# Patient Record
Sex: FEMALE | Race: WHITE | Employment: OTHER | URBAN - METROPOLITAN AREA
[De-identification: names, ages, dates, MRNs, and addresses within clinical notes are randomized per-mention and may not be internally consistent; named-entity substitution may affect disease eponyms.]

---

## 2018-01-03 PROBLEM — M54.2 NECK PAIN: Status: ACTIVE | Noted: 2018-01-03

## 2018-04-10 ENCOUNTER — HOSPITAL ENCOUNTER (EMERGENCY)
Age: 48
Discharge: HOME | End: 2018-04-10
Attending: EMERGENCY MEDICINE
Payer: OTHER GOVERNMENT

## 2018-04-10 VITALS
WEIGHT: 155 LBS | TEMPERATURE: 98.6 F | RESPIRATION RATE: 16 BRPM | BODY MASS INDEX: 26.46 KG/M2 | HEIGHT: 64 IN | SYSTOLIC BLOOD PRESSURE: 110 MMHG | OXYGEN SATURATION: 96 % | HEART RATE: 58 BPM | DIASTOLIC BLOOD PRESSURE: 60 MMHG

## 2018-04-10 DIAGNOSIS — J01.00 ACUTE NON-RECURRENT MAXILLARY SINUSITIS: Primary | ICD-10-CM

## 2018-04-10 PROCEDURE — 99283 EMERGENCY DEPT VISIT LOW MDM: CPT

## 2018-04-10 RX ORDER — METHYLPREDNISOLONE 4 MG/1
TABLET ORAL
Qty: 1 KIT | Refills: 0 | Status: SHIPPED | OUTPATIENT
Start: 2018-04-10 | End: 2018-04-16

## 2018-04-10 RX ORDER — AZITHROMYCIN 250 MG/1
TABLET, FILM COATED ORAL
Qty: 1 PACKET | Refills: 0 | Status: SHIPPED | OUTPATIENT
Start: 2018-04-10 | End: 2018-04-20

## 2018-04-10 ASSESSMENT — PAIN DESCRIPTION - LOCATION: LOCATION: HEAD

## 2018-04-10 ASSESSMENT — PAIN SCALES - GENERAL: PAINLEVEL_OUTOF10: 10

## 2018-04-10 ASSESSMENT — PAIN DESCRIPTION - PAIN TYPE: TYPE: ACUTE PAIN

## 2018-04-10 ASSESSMENT — PAIN DESCRIPTION - DESCRIPTORS: DESCRIPTORS: ACHING

## 2018-06-19 PROBLEM — M50.30 BULGING OF CERVICAL INTERVERTEBRAL DISC: Status: ACTIVE | Noted: 2018-06-19

## 2018-08-02 ENCOUNTER — HOSPITAL ENCOUNTER (EMERGENCY)
Age: 48
Discharge: HOME | End: 2018-08-02
Attending: FAMILY MEDICINE
Payer: OTHER GOVERNMENT

## 2018-08-02 ENCOUNTER — APPOINTMENT (OUTPATIENT)
Dept: GENERAL RADIOLOGY | Age: 48
End: 2018-08-02
Payer: OTHER GOVERNMENT

## 2018-08-02 VITALS
TEMPERATURE: 98 F | OXYGEN SATURATION: 98 % | HEIGHT: 64 IN | WEIGHT: 153 LBS | RESPIRATION RATE: 16 BRPM | HEART RATE: 80 BPM | BODY MASS INDEX: 26.12 KG/M2 | DIASTOLIC BLOOD PRESSURE: 66 MMHG | SYSTOLIC BLOOD PRESSURE: 126 MMHG

## 2018-08-02 DIAGNOSIS — R07.9 CHEST PAIN, UNSPECIFIED TYPE: Primary | ICD-10-CM

## 2018-08-02 DIAGNOSIS — F41.1 ANXIETY STATE: ICD-10-CM

## 2018-08-02 LAB
ALBUMIN SERPL-MCNC: 4.2 G/DL (ref 3.5–5.2)
ALP BLD-CCNC: 65 U/L (ref 35–104)
ALT SERPL-CCNC: 7 U/L (ref 0–32)
ANION GAP SERPL CALCULATED.3IONS-SCNC: 16 MMOL/L (ref 7–16)
AST SERPL-CCNC: 12 U/L (ref 0–31)
BASOPHILS ABSOLUTE: 0.05 E9/L (ref 0–0.2)
BASOPHILS RELATIVE PERCENT: 0.5 % (ref 0–2)
BILIRUB SERPL-MCNC: 0.2 MG/DL (ref 0–1.2)
BUN BLDV-MCNC: 20 MG/DL (ref 6–20)
CALCIUM SERPL-MCNC: 9.1 MG/DL (ref 8.6–10.2)
CHLORIDE BLD-SCNC: 104 MMOL/L (ref 98–107)
CK MB: 1.8 NG/ML (ref 0–4.3)
CO2: 18 MMOL/L (ref 22–29)
CREAT SERPL-MCNC: 0.8 MG/DL (ref 0.5–1)
EOSINOPHILS ABSOLUTE: 0.14 E9/L (ref 0.05–0.5)
EOSINOPHILS RELATIVE PERCENT: 1.3 % (ref 0–6)
GFR AFRICAN AMERICAN: >60
GFR NON-AFRICAN AMERICAN: >60 ML/MIN/1.73
GLUCOSE BLD-MCNC: 104 MG/DL (ref 74–109)
HCT VFR BLD CALC: 40.2 % (ref 34–48)
HEMOGLOBIN: 14.3 G/DL (ref 11.5–15.5)
IMMATURE GRANULOCYTES #: 0.07 E9/L
IMMATURE GRANULOCYTES %: 0.6 % (ref 0–5)
LYMPHOCYTES ABSOLUTE: 3.85 E9/L (ref 1.5–4)
LYMPHOCYTES RELATIVE PERCENT: 35.3 % (ref 20–42)
MCH RBC QN AUTO: 31.7 PG (ref 26–35)
MCHC RBC AUTO-ENTMCNC: 35.6 % (ref 32–34.5)
MCV RBC AUTO: 89.1 FL (ref 80–99.9)
MONOCYTES ABSOLUTE: 0.9 E9/L (ref 0.1–0.95)
MONOCYTES RELATIVE PERCENT: 8.2 % (ref 2–12)
NEUTROPHILS ABSOLUTE: 5.91 E9/L (ref 1.8–7.3)
NEUTROPHILS RELATIVE PERCENT: 54.1 % (ref 43–80)
PDW BLD-RTO: 13.5 FL (ref 11.5–15)
PLATELET # BLD: 307 E9/L (ref 130–450)
PMV BLD AUTO: 11.4 FL (ref 7–12)
POTASSIUM SERPL-SCNC: 4 MMOL/L (ref 3.5–5)
RBC # BLD: 4.51 E12/L (ref 3.5–5.5)
SODIUM BLD-SCNC: 138 MMOL/L (ref 132–146)
TOTAL PROTEIN: 7.4 G/DL (ref 6.4–8.3)
TROPONIN: <0.01 NG/ML (ref 0–0.03)
WBC # BLD: 10.9 E9/L (ref 4.5–11.5)

## 2018-08-02 PROCEDURE — 84484 ASSAY OF TROPONIN QUANT: CPT

## 2018-08-02 PROCEDURE — 82553 CREATINE MB FRACTION: CPT

## 2018-08-02 PROCEDURE — 80053 COMPREHEN METABOLIC PANEL: CPT

## 2018-08-02 PROCEDURE — 71045 X-RAY EXAM CHEST 1 VIEW: CPT

## 2018-08-02 PROCEDURE — 85025 COMPLETE CBC W/AUTO DIFF WBC: CPT

## 2018-08-02 PROCEDURE — 36415 COLL VENOUS BLD VENIPUNCTURE: CPT

## 2018-08-02 PROCEDURE — 2580000003 HC RX 258: Performed by: FAMILY MEDICINE

## 2018-08-02 PROCEDURE — 6370000000 HC RX 637 (ALT 250 FOR IP): Performed by: FAMILY MEDICINE

## 2018-08-02 PROCEDURE — 99285 EMERGENCY DEPT VISIT HI MDM: CPT

## 2018-08-02 RX ORDER — SODIUM CHLORIDE 9 MG/ML
INJECTION, SOLUTION INTRAVENOUS CONTINUOUS
Status: DISCONTINUED | OUTPATIENT
Start: 2018-08-02 | End: 2018-08-02 | Stop reason: HOSPADM

## 2018-08-02 RX ORDER — ALPRAZOLAM 0.25 MG/1
0.25 TABLET ORAL NIGHTLY PRN
COMMUNITY
End: 2018-11-30

## 2018-08-02 RX ORDER — NITROGLYCERIN 0.4 MG/1
0.4 TABLET SUBLINGUAL EVERY 5 MIN PRN
Status: DISCONTINUED | OUTPATIENT
Start: 2018-08-02 | End: 2018-08-02 | Stop reason: HOSPADM

## 2018-08-02 RX ORDER — ASPIRIN 81 MG/1
324 TABLET, CHEWABLE ORAL ONCE
Status: COMPLETED | OUTPATIENT
Start: 2018-08-02 | End: 2018-08-02

## 2018-08-02 RX ADMIN — SODIUM CHLORIDE: 9 INJECTION, SOLUTION INTRAVENOUS at 18:31

## 2018-08-02 RX ADMIN — ASPIRIN 81 MG CHEWABLE TABLET 324 MG: 81 TABLET CHEWABLE at 18:31

## 2018-08-02 ASSESSMENT — PAIN DESCRIPTION - PAIN TYPE: TYPE: ACUTE PAIN

## 2018-08-02 ASSESSMENT — PAIN DESCRIPTION - FREQUENCY: FREQUENCY: CONTINUOUS

## 2018-08-02 ASSESSMENT — PAIN SCALES - GENERAL: PAINLEVEL_OUTOF10: 2

## 2018-08-02 ASSESSMENT — PAIN DESCRIPTION - DESCRIPTORS: DESCRIPTORS: ACHING

## 2018-08-02 ASSESSMENT — PAIN DESCRIPTION - LOCATION: LOCATION: CHEST

## 2018-08-02 NOTE — ED PROVIDER NOTES
(degenerative joint disease); Fibromyalgia; GERD (gastroesophageal reflux disease); Hiatal hernia; Osteoarthritis; Osteopenia; and Seizure ([address] Past Surgical History:  has a past surgical history that includes Hysterectomy; Endometrial ablation; Knee cartilage surgery; Knee arthroscopy; fracture surgery; ECHO Compl W Dop Color Flow (3/21/2012); Cholecystectomy; back surgery (x2); and Breast surgery. Social History:  reports that she quit smoking about 5 years ago. She quit after 20.00 years of use. She has never used smokeless tobacco. She reports that she does not drink alcohol or use drugs. Family History: family history is not on file. The patients home medications have been reviewed. Allergies: Patient has no known allergies.     -------------------------------------------------- RESULTS -------------------------------------------------  Results for orders placed or performed during the hospital encounter of 08/02/18   CBC Auto Differential   Result Value Ref Range    WBC 10.9 4.5 - 11.5 E9/L    RBC 4.51 3.50 - 5.50 E12/L    Hemoglobin 14.3 11.5 - 15.5 g/dL    Hematocrit 40.2 34.0 - 48.0 %    MCV 89.1 80.0 - 99.9 fL    MCH 31.7 26.0 - 35.0 pg    MCHC 35.6 (H) 32.0 - 34.5 %    RDW 13.5 11.5 - 15.0 fL    Platelets 148 164 - 815 E9/L    MPV 11.4 7.0 - 12.0 fL    Neutrophils % 54.1 43.0 - 80.0 %    Immature Granulocytes % 0.6 0.0 - 5.0 %    Lymphocytes % 35.3 20.0 - 42.0 %    Monocytes % 8.2 2.0 - 12.0 %    Eosinophils % 1.3 0.0 - 6.0 %    Basophils % 0.5 0.0 - 2.0 %    Neutrophils # 5.91 1.80 - 7.30 E9/L    Immature Granulocytes # 0.07 E9/L    Lymphocytes # 3.85 1.50 - 4.00 E9/L    Monocytes # 0.90 0.10 - 0.95 E9/L    Eosinophils # 0.14 0.05 - 0.50 E9/L    Basophils # 0.05 0.00 - 0.20 E9/L   Comprehensive Metabolic Panel   Result Value Ref Range    Sodium 138 132 - 146 mmol/L    Potassium 4.0 3.5 - 5.0 mmol/L    Chloride 104 98 - 107 mmol/L    CO2 18 (L) 22 - 29 mmol/L    Anion Gap 16 7 - 16

## 2018-08-04 ENCOUNTER — HOSPITAL ENCOUNTER (EMERGENCY)
Age: 48
Discharge: HOME | End: 2018-08-04
Attending: EMERGENCY MEDICINE
Payer: OTHER GOVERNMENT

## 2018-08-04 VITALS
OXYGEN SATURATION: 98 % | TEMPERATURE: 98.2 F | SYSTOLIC BLOOD PRESSURE: 113 MMHG | HEIGHT: 64 IN | HEART RATE: 97 BPM | BODY MASS INDEX: 26.12 KG/M2 | DIASTOLIC BLOOD PRESSURE: 84 MMHG | WEIGHT: 153 LBS | RESPIRATION RATE: 18 BRPM

## 2018-08-04 DIAGNOSIS — F41.1 ANXIETY STATE: Primary | ICD-10-CM

## 2018-08-04 PROCEDURE — 99285 EMERGENCY DEPT VISIT HI MDM: CPT

## 2018-08-04 PROCEDURE — 6370000000 HC RX 637 (ALT 250 FOR IP): Performed by: EMERGENCY MEDICINE

## 2018-08-04 PROCEDURE — 96372 THER/PROPH/DIAG INJ SC/IM: CPT

## 2018-08-04 PROCEDURE — 6360000002 HC RX W HCPCS: Performed by: EMERGENCY MEDICINE

## 2018-08-04 RX ORDER — LORAZEPAM 1 MG/1
1 TABLET ORAL ONCE
Status: COMPLETED | OUTPATIENT
Start: 2018-08-04 | End: 2018-08-04

## 2018-08-04 RX ORDER — HYDROXYZINE PAMOATE 25 MG/1
25 CAPSULE ORAL 4 TIMES DAILY PRN
Qty: 30 CAPSULE | Refills: 0 | Status: SHIPPED | OUTPATIENT
Start: 2018-08-04 | End: 2018-08-18

## 2018-08-04 RX ORDER — HYDROXYZINE HYDROCHLORIDE 50 MG/ML
50 INJECTION, SOLUTION INTRAMUSCULAR ONCE
Status: COMPLETED | OUTPATIENT
Start: 2018-08-04 | End: 2018-08-04

## 2018-08-04 RX ORDER — GABAPENTIN 300 MG/1
600 CAPSULE ORAL NIGHTLY
COMMUNITY
End: 2018-11-30

## 2018-08-04 RX ADMIN — HYDROXYZINE HYDROCHLORIDE 50 MG: 50 INJECTION, SOLUTION INTRAMUSCULAR at 19:06

## 2018-08-04 RX ADMIN — LORAZEPAM 1 MG: 1 TABLET ORAL at 17:36

## 2018-08-04 ASSESSMENT — PAIN DESCRIPTION - FREQUENCY: FREQUENCY: CONTINUOUS

## 2018-08-04 ASSESSMENT — PAIN DESCRIPTION - DESCRIPTORS
DESCRIPTORS: CONSTANT
DESCRIPTORS_2: SHARP
DESCRIPTORS: CONSTANT;DISCOMFORT;SHARP

## 2018-08-04 ASSESSMENT — PAIN DESCRIPTION - PAIN TYPE
TYPE: ACUTE PAIN
TYPE: ACUTE PAIN

## 2018-08-04 ASSESSMENT — PAIN DESCRIPTION - LOCATION
LOCATION: JAW
LOCATION: CHEST;JAW
LOCATION_2: CHEST

## 2018-08-04 ASSESSMENT — PAIN SCALES - GENERAL
PAINLEVEL_OUTOF10: 8
PAINLEVEL_OUTOF10: 10

## 2018-08-04 ASSESSMENT — PAIN DESCRIPTION - INTENSITY: RATING_2: 7

## 2018-08-04 ASSESSMENT — PAIN DESCRIPTION - ORIENTATION
ORIENTATION: LEFT
ORIENTATION: LEFT;RIGHT
ORIENTATION_2: MID;ANTERIOR

## 2018-08-04 ASSESSMENT — PAIN DESCRIPTION - ONSET: ONSET: PROGRESSIVE

## 2018-08-04 ASSESSMENT — PAIN DESCRIPTION - PROGRESSION: CLINICAL_PROGRESSION: GRADUALLY WORSENING
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
## 2018-08-04 NOTE — ED NOTES
nonrebreather applied for hyperventilation. SO with pt and call light in reach. Cannot stop shaking and feeling tense. Ativan given.      Kash Mccoy RN  08/04/18 0044
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
## 2018-08-17 LAB
EKG ATRIAL RATE: 72 BPM
EKG ATRIAL RATE: 80 BPM
EKG ATRIAL RATE: 99 BPM
EKG P AXIS: 56 DEGREES
EKG P AXIS: 64 DEGREES
EKG P AXIS: 67 DEGREES
EKG P-R INTERVAL: 124 MS
EKG P-R INTERVAL: 144 MS
EKG P-R INTERVAL: 144 MS
EKG Q-T INTERVAL: 350 MS
EKG Q-T INTERVAL: 402 MS
EKG Q-T INTERVAL: 404 MS
EKG QRS DURATION: 78 MS
EKG QRS DURATION: 86 MS
EKG QRS DURATION: 90 MS
EKG QTC CALCULATION (BAZETT): 440 MS
EKG QTC CALCULATION (BAZETT): 449 MS
EKG QTC CALCULATION (BAZETT): 465 MS
EKG R AXIS: 26 DEGREES
EKG R AXIS: 39 DEGREES
EKG R AXIS: 47 DEGREES
EKG T AXIS: -2 DEGREES
EKG T AXIS: -57 DEGREES
EKG T AXIS: 42 DEGREES
EKG VENTRICULAR RATE: 72 BPM
EKG VENTRICULAR RATE: 80 BPM
EKG VENTRICULAR RATE: 99 BPM

## 2018-09-07 ENCOUNTER — APPOINTMENT (OUTPATIENT)
Dept: GENERAL RADIOLOGY | Age: 48
End: 2018-09-07
Payer: MEDICARE

## 2018-09-07 ENCOUNTER — HOSPITAL ENCOUNTER (EMERGENCY)
Age: 48
Discharge: HOME | End: 2018-09-07
Attending: EMERGENCY MEDICINE
Payer: MEDICARE

## 2018-09-07 VITALS
RESPIRATION RATE: 14 BRPM | OXYGEN SATURATION: 100 % | HEIGHT: 64 IN | TEMPERATURE: 98.1 F | DIASTOLIC BLOOD PRESSURE: 59 MMHG | WEIGHT: 155 LBS | SYSTOLIC BLOOD PRESSURE: 113 MMHG | HEART RATE: 79 BPM | BODY MASS INDEX: 26.46 KG/M2

## 2018-09-07 DIAGNOSIS — F41.1 ANXIETY STATE: ICD-10-CM

## 2018-09-07 DIAGNOSIS — R07.9 CHEST PAIN, UNSPECIFIED TYPE: Primary | ICD-10-CM

## 2018-09-07 LAB
ANION GAP SERPL CALCULATED.3IONS-SCNC: 15 MMOL/L (ref 7–16)
BASOPHILS ABSOLUTE: 0.04 E9/L (ref 0–0.2)
BASOPHILS RELATIVE PERCENT: 0.4 % (ref 0–2)
BUN BLDV-MCNC: 14 MG/DL (ref 6–20)
CALCIUM SERPL-MCNC: 9.6 MG/DL (ref 8.6–10.2)
CHLORIDE BLD-SCNC: 106 MMOL/L (ref 98–107)
CO2: 19 MMOL/L (ref 22–29)
CREAT SERPL-MCNC: 0.7 MG/DL (ref 0.5–1)
EKG ATRIAL RATE: 66 BPM
EKG Q-T INTERVAL: 430 MS
EKG QRS DURATION: 78 MS
EKG QTC CALCULATION (BAZETT): 443 MS
EKG R AXIS: 39 DEGREES
EKG T AXIS: 32 DEGREES
EKG VENTRICULAR RATE: 64 BPM
EOSINOPHILS ABSOLUTE: 0.04 E9/L (ref 0.05–0.5)
EOSINOPHILS RELATIVE PERCENT: 0.4 % (ref 0–6)
GFR AFRICAN AMERICAN: >60
GFR NON-AFRICAN AMERICAN: >60 ML/MIN/1.73
GLUCOSE BLD-MCNC: 98 MG/DL (ref 74–109)
HCT VFR BLD CALC: 37.5 % (ref 34–48)
HEMOGLOBIN: 12.9 G/DL (ref 11.5–15.5)
IMMATURE GRANULOCYTES #: 0.05 E9/L
IMMATURE GRANULOCYTES %: 0.5 % (ref 0–5)
LYMPHOCYTES ABSOLUTE: 3.02 E9/L (ref 1.5–4)
LYMPHOCYTES RELATIVE PERCENT: 30.1 % (ref 20–42)
MCH RBC QN AUTO: 31.4 PG (ref 26–35)
MCHC RBC AUTO-ENTMCNC: 34.4 % (ref 32–34.5)
MCV RBC AUTO: 91.2 FL (ref 80–99.9)
MONOCYTES ABSOLUTE: 0.51 E9/L (ref 0.1–0.95)
MONOCYTES RELATIVE PERCENT: 5.1 % (ref 2–12)
NEUTROPHILS ABSOLUTE: 6.38 E9/L (ref 1.8–7.3)
NEUTROPHILS RELATIVE PERCENT: 63.5 % (ref 43–80)
PDW BLD-RTO: 13.7 FL (ref 11.5–15)
PLATELET # BLD: 299 E9/L (ref 130–450)
PMV BLD AUTO: 10.6 FL (ref 7–12)
POTASSIUM SERPL-SCNC: 3.7 MMOL/L (ref 3.5–5)
RBC # BLD: 4.11 E12/L (ref 3.5–5.5)
SODIUM BLD-SCNC: 140 MMOL/L (ref 132–146)
TROPONIN: <0.01 NG/ML (ref 0–0.03)
WBC # BLD: 10 E9/L (ref 4.5–11.5)

## 2018-09-07 PROCEDURE — 36415 COLL VENOUS BLD VENIPUNCTURE: CPT

## 2018-09-07 PROCEDURE — 93005 ELECTROCARDIOGRAM TRACING: CPT | Performed by: EMERGENCY MEDICINE

## 2018-09-07 PROCEDURE — 80048 BASIC METABOLIC PNL TOTAL CA: CPT

## 2018-09-07 PROCEDURE — 99285 EMERGENCY DEPT VISIT HI MDM: CPT

## 2018-09-07 PROCEDURE — 71045 X-RAY EXAM CHEST 1 VIEW: CPT

## 2018-09-07 PROCEDURE — 6370000000 HC RX 637 (ALT 250 FOR IP): Performed by: EMERGENCY MEDICINE

## 2018-09-07 PROCEDURE — 85025 COMPLETE CBC W/AUTO DIFF WBC: CPT

## 2018-09-07 PROCEDURE — 2580000003 HC RX 258: Performed by: EMERGENCY MEDICINE

## 2018-09-07 PROCEDURE — 84484 ASSAY OF TROPONIN QUANT: CPT

## 2018-09-07 RX ORDER — 0.9 % SODIUM CHLORIDE 0.9 %
1000 INTRAVENOUS SOLUTION INTRAVENOUS ONCE
Status: COMPLETED | OUTPATIENT
Start: 2018-09-07 | End: 2018-09-07

## 2018-09-07 RX ORDER — LORAZEPAM 0.5 MG/1
0.5 TABLET ORAL EVERY 6 HOURS PRN
COMMUNITY
End: 2018-11-30

## 2018-09-07 RX ORDER — NITROGLYCERIN 0.4 MG/1
0.4 TABLET SUBLINGUAL EVERY 5 MIN PRN
Status: DISCONTINUED | OUTPATIENT
Start: 2018-09-07 | End: 2018-09-07 | Stop reason: HOSPADM

## 2018-09-07 RX ORDER — ASPIRIN 81 MG/1
324 TABLET, CHEWABLE ORAL ONCE
Status: COMPLETED | OUTPATIENT
Start: 2018-09-07 | End: 2018-09-07

## 2018-09-07 RX ADMIN — NITROGLYCERIN 0.4 MG: 0.4 TABLET SUBLINGUAL at 15:50

## 2018-09-07 RX ADMIN — ASPIRIN 81 MG 324 MG: 81 TABLET ORAL at 15:49

## 2018-09-07 RX ADMIN — SODIUM CHLORIDE 1000 ML: 9 INJECTION, SOLUTION INTRAVENOUS at 15:49

## 2018-09-07 RX ADMIN — NITROGLYCERIN 0.4 MG: 0.4 TABLET SUBLINGUAL at 15:59

## 2018-09-07 ASSESSMENT — PAIN SCALES - GENERAL
PAINLEVEL_OUTOF10: 5
PAINLEVEL_OUTOF10: 6
PAINLEVEL_OUTOF10: 2
PAINLEVEL_OUTOF10: 0
PAINLEVEL_OUTOF10: 4

## 2018-09-07 ASSESSMENT — PAIN DESCRIPTION - PAIN TYPE
TYPE: ACUTE PAIN

## 2018-09-07 ASSESSMENT — PAIN DESCRIPTION - ORIENTATION
ORIENTATION: LEFT;MID
ORIENTATION: MID;LEFT

## 2018-09-07 ASSESSMENT — PAIN DESCRIPTION - LOCATION
LOCATION: CHEST

## 2018-09-07 ASSESSMENT — PAIN DESCRIPTION - ONSET: ONSET: SUDDEN

## 2018-09-07 ASSESSMENT — PAIN DESCRIPTION - DESCRIPTORS
DESCRIPTORS: HEAVINESS
DESCRIPTORS: HEAVINESS

## 2018-09-07 ASSESSMENT — PAIN DESCRIPTION - PROGRESSION: CLINICAL_PROGRESSION: GRADUALLY WORSENING

## 2018-09-07 ASSESSMENT — PAIN DESCRIPTION - FREQUENCY
FREQUENCY: CONTINUOUS
FREQUENCY: CONTINUOUS

## 2018-09-07 NOTE — ED PROVIDER NOTES
HPI:  9/7/18, Time: 3:28 PM         Nile Smart is a 51 y.o. female presenting to the ED for substernal chest pain radiating to both sides of jaw, left shoulder tingling, nausea, emesis X1, dyspnea and anxiety with \"muscle shaking\", beginning a couple hours ago while doing laundry. Patient states she had a small heart attack one month ago (She states her PCP did an EKG in his office a month ago and told her she had a small heart attack at some point in her life and no cardiology follow up was done). That next day she was seen in the ER for same thing as today and was diagnosed with a panic attack. She reports she had a stress test in 2012 when she was admitted for seizures. Chart reivewed revealed no evidence of seizures, but did have a normal stress test as part of a syncope workup. The complaint has been persistent, moderate in severity, and worsened by nothing. Patient is still symptomatic. Did not take ASA PTA. Patient denies fever/chills, cough, congestion,  shortness of breath, edema, headache, visual disturbances, focal paresthesias, focal weakness, abdominal pain, diarrhea, constipation, dysuria, hematuria, trauma, neck or back pain or other complaints. ROS:   Pertinent positives and negatives are stated within HPI, all other systems reviewed and are negative.      --------------------------------------------- PAST HISTORY ---------------------------------------------  Past Medical History:  has a past medical history of Arthritis; Arthritis; Bleeding ulcer; DJD (degenerative joint disease); DJD (degenerative joint disease); DJD (degenerative joint disease); DJD (degenerative joint disease); Fibromyalgia; GERD (gastroesophageal reflux disease); Hiatal hernia; Osteoarthritis; Osteopenia; and Seizure ([facility] [address] Past Surgical History:  has a past surgical history that includes Hysterectomy;  Endometrial ablation; Knee cartilage surgery; Knee arthroscopy; fracture surgery; ECHO Compl W Dop Color Flow 35.0 pg    MCHC 34.4 32.0 - 34.5 %    RDW 13.7 11.5 - 15.0 fL    Platelets 933 603 - 766 E9/L    MPV 10.6 7.0 - 12.0 fL    Neutrophils % 63.5 43.0 - 80.0 %    Immature Granulocytes % 0.5 0.0 - 5.0 %    Lymphocytes % 30.1 20.0 - 42.0 %    Monocytes % 5.1 2.0 - 12.0 %    Eosinophils % 0.4 0.0 - 6.0 %    Basophils % 0.4 0.0 - 2.0 %    Neutrophils # 6.38 1.80 - 7.30 E9/L    Immature Granulocytes # 0.05 E9/L    Lymphocytes # 3.02 1.50 - 4.00 E9/L    Monocytes # 0.51 0.10 - 0.95 E9/L    Eosinophils # 0.04 (L) 0.05 - 0.50 E9/L    Basophils # 0.04 0.00 - 0.20 Y4/Y   Basic Metabolic Panel   Result Value Ref Range    Sodium 140 132 - 146 mmol/L    Potassium 3.7 3.5 - 5.0 mmol/L    Chloride 106 98 - 107 mmol/L    CO2 19 (L) 22 - 29 mmol/L    Anion Gap 15 7 - 16 mmol/L    Glucose 98 74 - 109 mg/dL    BUN 14 6 - 20 mg/dL    CREATININE 0.7 0.5 - 1.0 mg/dL    GFR Non-African American >60 >=60 mL/min/1.73    GFR African American >60     Calcium 9.6 8.6 - 10.2 mg/dL   Troponin   Result Value Ref Range    Troponin <0.01 0.00 - 0.03 ng/mL       RADIOLOGY:  Interpreted by Radiologist.  XR CHEST PORTABLE   Final Result   No evidence of acute pulmonary disease. EKG Interpretation  Interpreted by emergency department physician,    Time: 1505  Rhythm: normal sinus   Rate: 64  Axis: normal  Conduction: normal  ST Segments: no acute change  T Waves: non specific changes  Clinical Impression: non-specific EKG  Comparison to prior EKG: None      ------------------------- NURSING NOTES AND VITALS REVIEWED ---------------------------   The nursing notes within the ED encounter and vital signs as below have been reviewed by myself. BP (!) 111/55   Pulse 54   Temp 98.1 °F (36.7 °C) (Oral)   Resp 16   Ht 5' 4\" (1.626 m)   Wt 155 lb (70.3 kg)   LMP 08/21/2011   SpO2 97%   BMI 26.61 kg/m²   Oxygen Saturation Interpretation: Normal    The patients available past medical records and past encounters were reviewed. further testing)  High Score         (7-10 Points), risk of MACE of 50-65% (Admit ALL as they are        candidates for early invasive measures)      This patient's ED course included: re-evaluation prior to disposition, cardiac monitoring, continuous pulse oximetry and a personal history and physicial eaxmination    This patient has remained hemodynamically stable, improved and been closely monitored during their ED course. Re-Evaluations:             Time: 5:28 PM  Re-evaluation. Patients symptoms are improving  Repeat physical examination is improved - no longer anxious        Consultations:             none    Critical Care: none        Counseling: The emergency provider has spoken with the patient and spouse/SO and discussed todays results, in addition to providing specific details for the plan of care and counseling regarding the diagnosis and prognosis. Questions are answered at this time and they are agreeable with the plan.       --------------------------------- IMPRESSION AND DISPOSITION ---------------------------------    IMPRESSION  1. Chest pain, unspecified type    2.  Anxiety state        DISPOSITION  Disposition: Discharge to home  Patient condition is stable                 Yari Bermeo DO  09/07/18 5840

## 2018-09-09 ENCOUNTER — HOSPITAL ENCOUNTER (EMERGENCY)
Age: 48
Discharge: HOME | End: 2018-09-09
Attending: FAMILY MEDICINE
Payer: MEDICARE

## 2018-09-09 ENCOUNTER — APPOINTMENT (OUTPATIENT)
Dept: CT IMAGING | Age: 48
End: 2018-09-09
Payer: MEDICARE

## 2018-09-09 VITALS
SYSTOLIC BLOOD PRESSURE: 110 MMHG | OXYGEN SATURATION: 99 % | TEMPERATURE: 98.2 F | HEART RATE: 77 BPM | DIASTOLIC BLOOD PRESSURE: 60 MMHG | RESPIRATION RATE: 18 BRPM

## 2018-09-09 DIAGNOSIS — R10.31 RIGHT LOWER QUADRANT ABDOMINAL PAIN: Primary | ICD-10-CM

## 2018-09-09 LAB
ALBUMIN SERPL-MCNC: 4 G/DL (ref 3.5–5.2)
ALP BLD-CCNC: 59 U/L (ref 35–104)
ALT SERPL-CCNC: 11 U/L (ref 0–32)
ANION GAP SERPL CALCULATED.3IONS-SCNC: 13 MMOL/L (ref 7–16)
AST SERPL-CCNC: 31 U/L (ref 0–31)
BACTERIA: NORMAL /HPF
BASOPHILS ABSOLUTE: 0.03 E9/L (ref 0–0.2)
BASOPHILS RELATIVE PERCENT: 0.4 % (ref 0–2)
BILIRUB SERPL-MCNC: 0.5 MG/DL (ref 0–1.2)
BILIRUBIN URINE: NEGATIVE
BLOOD, URINE: ABNORMAL
BUN BLDV-MCNC: 12 MG/DL (ref 6–20)
CALCIUM SERPL-MCNC: 9.5 MG/DL (ref 8.6–10.2)
CHLORIDE BLD-SCNC: 108 MMOL/L (ref 98–107)
CLARITY: CLEAR
CO2: 18 MMOL/L (ref 22–29)
COLOR: YELLOW
CREAT SERPL-MCNC: 0.7 MG/DL (ref 0.5–1)
EOSINOPHILS ABSOLUTE: 0.01 E9/L (ref 0.05–0.5)
EOSINOPHILS RELATIVE PERCENT: 0.1 % (ref 0–6)
EPITHELIAL CELLS, UA: NORMAL /HPF
GFR AFRICAN AMERICAN: >60
GFR NON-AFRICAN AMERICAN: >60 ML/MIN/1.73
GLUCOSE BLD-MCNC: 100 MG/DL (ref 74–109)
GLUCOSE URINE: NEGATIVE MG/DL
HCG(URINE) PREGNANCY TEST: NEGATIVE
HCT VFR BLD CALC: 36.4 % (ref 34–48)
HEMOGLOBIN: 12.9 G/DL (ref 11.5–15.5)
IMMATURE GRANULOCYTES #: 0.03 E9/L
IMMATURE GRANULOCYTES %: 0.4 % (ref 0–5)
KETONES, URINE: 40 MG/DL
LACTIC ACID: 1.1 MMOL/L (ref 0.5–2.2)
LEUKOCYTE ESTERASE, URINE: NEGATIVE
LIPASE: 19 U/L (ref 13–60)
LYMPHOCYTES ABSOLUTE: 1.74 E9/L (ref 1.5–4)
LYMPHOCYTES RELATIVE PERCENT: 20.6 % (ref 20–42)
MCH RBC QN AUTO: 31.9 PG (ref 26–35)
MCHC RBC AUTO-ENTMCNC: 35.4 % (ref 32–34.5)
MCV RBC AUTO: 90.1 FL (ref 80–99.9)
MONOCYTES ABSOLUTE: 0.49 E9/L (ref 0.1–0.95)
MONOCYTES RELATIVE PERCENT: 5.8 % (ref 2–12)
NEUTROPHILS ABSOLUTE: 6.16 E9/L (ref 1.8–7.3)
NEUTROPHILS RELATIVE PERCENT: 72.7 % (ref 43–80)
NITRITE, URINE: NEGATIVE
PDW BLD-RTO: 13.5 FL (ref 11.5–15)
PH UA: 7.5 (ref 5–9)
PLATELET # BLD: 291 E9/L (ref 130–450)
PMV BLD AUTO: 10.8 FL (ref 7–12)
POTASSIUM REFLEX MAGNESIUM: 4.6 MMOL/L (ref 3.5–5)
PROTEIN UA: NEGATIVE MG/DL
RBC # BLD: 4.04 E12/L (ref 3.5–5.5)
RBC UA: NORMAL /HPF (ref 0–2)
SODIUM BLD-SCNC: 139 MMOL/L (ref 132–146)
SPECIFIC GRAVITY UA: 1.01 (ref 1–1.03)
TOTAL PROTEIN: 7 G/DL (ref 6.4–8.3)
UROBILINOGEN, URINE: 0.2 E.U./DL
WBC # BLD: 8.5 E9/L (ref 4.5–11.5)
WBC UA: NORMAL /HPF (ref 0–5)

## 2018-09-09 PROCEDURE — 36415 COLL VENOUS BLD VENIPUNCTURE: CPT

## 2018-09-09 PROCEDURE — 2580000003 HC RX 258: Performed by: FAMILY MEDICINE

## 2018-09-09 PROCEDURE — 96374 THER/PROPH/DIAG INJ IV PUSH: CPT

## 2018-09-09 PROCEDURE — 74177 CT ABD & PELVIS W/CONTRAST: CPT

## 2018-09-09 PROCEDURE — 83605 ASSAY OF LACTIC ACID: CPT

## 2018-09-09 PROCEDURE — 80053 COMPREHEN METABOLIC PANEL: CPT

## 2018-09-09 PROCEDURE — 6360000004 HC RX CONTRAST MEDICATION: Performed by: RADIOLOGY

## 2018-09-09 PROCEDURE — 6360000002 HC RX W HCPCS: Performed by: FAMILY MEDICINE

## 2018-09-09 PROCEDURE — 96375 TX/PRO/DX INJ NEW DRUG ADDON: CPT

## 2018-09-09 PROCEDURE — 99284 EMERGENCY DEPT VISIT MOD MDM: CPT

## 2018-09-09 PROCEDURE — 81025 URINE PREGNANCY TEST: CPT

## 2018-09-09 PROCEDURE — 81001 URINALYSIS AUTO W/SCOPE: CPT

## 2018-09-09 PROCEDURE — 85025 COMPLETE CBC W/AUTO DIFF WBC: CPT

## 2018-09-09 PROCEDURE — 83690 ASSAY OF LIPASE: CPT

## 2018-09-09 RX ORDER — 0.9 % SODIUM CHLORIDE 0.9 %
1000 INTRAVENOUS SOLUTION INTRAVENOUS ONCE
Status: COMPLETED | OUTPATIENT
Start: 2018-09-09 | End: 2018-09-09

## 2018-09-09 RX ORDER — KETOROLAC TROMETHAMINE 30 MG/ML
30 INJECTION, SOLUTION INTRAMUSCULAR; INTRAVENOUS ONCE
Status: COMPLETED | OUTPATIENT
Start: 2018-09-09 | End: 2018-09-09

## 2018-09-09 RX ORDER — PROMETHAZINE HYDROCHLORIDE 25 MG/1
25 TABLET ORAL EVERY 6 HOURS PRN
Qty: 10 TABLET | Refills: 0 | Status: SHIPPED | OUTPATIENT
Start: 2018-09-09 | End: 2018-09-16

## 2018-09-09 RX ORDER — DIPHENHYDRAMINE HYDROCHLORIDE 50 MG/ML
25 INJECTION INTRAMUSCULAR; INTRAVENOUS ONCE
Status: COMPLETED | OUTPATIENT
Start: 2018-09-09 | End: 2018-09-09

## 2018-09-09 RX ADMIN — DIPHENHYDRAMINE HYDROCHLORIDE 25 MG: 50 INJECTION, SOLUTION INTRAMUSCULAR; INTRAVENOUS at 08:46

## 2018-09-09 RX ADMIN — IOPAMIDOL 100 ML: 755 INJECTION, SOLUTION INTRAVENOUS at 09:27

## 2018-09-09 RX ADMIN — KETOROLAC TROMETHAMINE 30 MG: 30 INJECTION INTRAMUSCULAR; INTRAVENOUS at 08:46

## 2018-09-09 RX ADMIN — PROCHLORPERAZINE EDISYLATE 10 MG: 5 INJECTION INTRAMUSCULAR; INTRAVENOUS at 08:46

## 2018-09-09 RX ADMIN — SODIUM CHLORIDE 1000 ML: 9 INJECTION, SOLUTION INTRAVENOUS at 08:47

## 2018-09-09 ASSESSMENT — PAIN DESCRIPTION - PROGRESSION: CLINICAL_PROGRESSION: GRADUALLY IMPROVING

## 2018-09-09 ASSESSMENT — PAIN SCALES - GENERAL
PAINLEVEL_OUTOF10: 3
PAINLEVEL_OUTOF10: 10

## 2018-09-09 ASSESSMENT — PAIN DESCRIPTION - ONSET: ONSET: ON-GOING

## 2018-09-09 ASSESSMENT — PAIN DESCRIPTION - PAIN TYPE: TYPE: ACUTE PAIN

## 2018-09-09 ASSESSMENT — PAIN DESCRIPTION - FREQUENCY: FREQUENCY: CONTINUOUS

## 2018-09-09 ASSESSMENT — PAIN DESCRIPTION - LOCATION: LOCATION: ABDOMEN

## 2018-09-09 NOTE — ED PROVIDER NOTES
10.8 7.0 - 12.0 fL    Neutrophils % 72.7 43.0 - 80.0 %    Immature Granulocytes % 0.4 0.0 - 5.0 %    Lymphocytes % 20.6 20.0 - 42.0 %    Monocytes % 5.8 2.0 - 12.0 %    Eosinophils % 0.1 0.0 - 6.0 %    Basophils % 0.4 0.0 - 2.0 %    Neutrophils # 6.16 1.80 - 7.30 E9/L    Immature Granulocytes # 0.03 E9/L    Lymphocytes # 1.74 1.50 - 4.00 E9/L    Monocytes # 0.49 0.10 - 0.95 E9/L    Eosinophils # 0.01 (L) 0.05 - 0.50 E9/L    Basophils # 0.03 0.00 - 0.20 E9/L   Comprehensive Metabolic Panel w/ Reflex to MG   Result Value Ref Range    Sodium 139 132 - 146 mmol/L    Potassium reflex Magnesium 4.6 3.5 - 5.0 mmol/L    Chloride 108 (H) 98 - 107 mmol/L    CO2 18 (L) 22 - 29 mmol/L    Anion Gap 13 7 - 16 mmol/L    Glucose 100 74 - 109 mg/dL    BUN 12 6 - 20 mg/dL    CREATININE 0.7 0.5 - 1.0 mg/dL    GFR Non-African American >60 >=60 mL/min/1.73    GFR African American >60     Calcium 9.5 8.6 - 10.2 mg/dL    Total Protein 7.0 6.4 - 8.3 g/dL    Alb 4.0 3.5 - 5.2 g/dL    Total Bilirubin 0.5 0.0 - 1.2 mg/dL    Alkaline Phosphatase 59 35 - 104 U/L    ALT 11 0 - 32 U/L    AST 31 0 - 31 U/L   Lipase   Result Value Ref Range    Lipase 19 13 - 60 U/L   Urinalysis   Result Value Ref Range    Color, UA Yellow Straw/Yellow    Clarity, UA Clear Clear    Glucose, Ur Negative Negative mg/dL    Bilirubin Urine Negative Negative    Ketones, Urine 40 (A) Negative mg/dL    Specific Gravity, UA 1.015 1.005 - 1.030    Blood, Urine TRACE (A) Negative    pH, UA 7.5 5.0 - 9.0    Protein, UA Negative Negative mg/dL    Urobilinogen, Urine 0.2 <2.0 E.U./dL    Nitrite, Urine Negative Negative    Leukocyte Esterase, Urine Negative Negative   Lactic acid, plasma   Result Value Ref Range    Lactic Acid 1.1 0.5 - 2.2 mmol/L   Pregnancy, urine   Result Value Ref Range    HCG(Urine) Pregnancy Test NEGATIVE NEGATIVE   Microscopic Urinalysis   Result Value Ref Range    WBC, UA NONE 0 - 5 /HPF    RBC, UA 0-1 0 - 2 /HPF    Epi Cells RARE /HPF    Bacteria, UA NONE /HPF     Imaging: All Radiology results interpreted by Radiologist unless otherwise noted. CT ABDOMEN PELVIS W IV CONTRAST Additional Contrast? None   Final Result   There are multiple diverticula seen    Renal masses, statistically likely cysts. Biliary dilatation, status post cholecystectomy                                       ED Course / Medical Decision Making     Medications   0.9 % sodium chloride bolus (0 mLs Intravenous Stopped 9/9/18 0950)   ketorolac (TORADOL) injection 30 mg (30 mg Intravenous Given 9/9/18 0846)   prochlorperazine (COMPAZINE) injection 10 mg (10 mg Intravenous Given 9/9/18 0846)   diphenhydrAMINE (BENADRYL) injection 25 mg (25 mg Intravenous Given 9/9/18 0846)   iopamidol (ISOVUE-370) 76 % injection 100 mL (100 mLs Intravenous Given 9/9/18 0927)        Re-Evaluations:  9/9/18      Time: 10:17  Patients symptoms are improving. 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 Counseling:   I have spoken with the spouse and patient and discussed todays results, in addition to providing specific details for the plan of care and counseling regarding the diagnosis and prognosis and are agreeable with the plan. Assessment      1. Right lower quadrant abdominal pain      This patient's ED course included: a personal history and physicial examination, re-evaluation prior to disposition, IV medications and complex medical decision making and emergency management  This patient has remained hemodynamically stable and improved during their ED course. Plan   Discharge to home. Patient condition is good.     New Medications     New Prescriptions    PROMETHAZINE (PHENERGAN) 25 MG TABLET    Take 1 tablet by mouth every 6 hours as needed for Nausea May cause drowsiness. Do not drive on this medication     Electronically signed by Isaiah Singh MD   DD: 9/9/18  **This report was transcribed using voice recognition software. Every effort was made to ensure accuracy; however, inadvertent computerized transcription errors may be present.   END OF PROVIDER NOTE        Isaiha Singh MD  09/09/18 0770

## 2018-11-27 NOTE — H&P
without obvious abnormality, atraumatic, sinuses nontender on palpation, external ears without lesions, oral pharynx with moist mucus membranes, tonsils without erythema or exudates, gums normal and good dentition. Neck:  Supple, symmetrical, trachea midline, no adenopathy, thyroid symmetric, not enlarged and no tenderness, skin normal    Heart:  Normal apical impulse, regular rate and rhythm, normal S1 and S2, no S3 or S4, and no murmur noted    Lungs:  No increased work of breathing, good air exchange, clear to auscultation bilaterally, no crackles or wheezing    Abdomen:  No scars, normal bowel sounds, soft, non-distended, non-tender, no masses palpated, no hepatosplenomegally    Extremities:  No clubbing, cyanosis, or edema    Neurologic:  Awake, alert, oriented to name, place and time. Cranial nerves II-XII are grossly intact. Motor is 5 out of 5 bilaterally. Cerebellar finger to nose, heel to shin intact. Sensory is intact. Babinski down going, Romberg negative, and gait is normal. Exceptions: Movements of the cervical spine are restricted and on exam there is tenderness over the cervical musculature with spasms 3+. Tinel test absent krupa. Phalen sign absent krupa. Weakness of shoulder abduction and biceps on the right. 4/5 biceps and triceps on the left. DATA:  Radiology Review:  CERV. MRI  ASSESSMENT AND PLAN:    1. Patient is a 51 y.o. female with above specified procedure planned 12/3/18 with anesthesia  2. Procedure options, risks and benefits reviewed with patient. Patient expresses understanding. Family member present for informed procedure process. Reviewed H & P.  No change in documentation needed as of 12/03/2018

## 2018-11-30 ENCOUNTER — HOSPITAL ENCOUNTER (OUTPATIENT)
Dept: PREADMISSION TESTING | Age: 48
Discharge: HOME | End: 2018-11-30

## 2018-11-30 DIAGNOSIS — Z01.812 PRE-OPERATIVE LABORATORY EXAMINATION: Primary | ICD-10-CM

## 2018-11-30 RX ORDER — LEVETIRACETAM 500 MG/1
500 TABLET ORAL 2 TIMES DAILY
COMMUNITY
End: 2019-04-10

## 2018-11-30 RX ORDER — GABAPENTIN 400 MG/1
400 CAPSULE ORAL 2 TIMES DAILY
COMMUNITY
End: 2019-04-10

## 2018-11-30 RX ORDER — CITALOPRAM 20 MG/1
20 TABLET ORAL DAILY
COMMUNITY
End: 2019-04-10

## 2018-12-02 ENCOUNTER — ANESTHESIA EVENT (OUTPATIENT)
Dept: OPERATING ROOM | Age: 48
End: 2018-12-02
Payer: OTHER GOVERNMENT

## 2018-12-03 ENCOUNTER — HOSPITAL ENCOUNTER (OUTPATIENT)
Age: 48
Discharge: HOME | End: 2018-12-04
Attending: NEUROLOGICAL SURGERY | Admitting: NEUROLOGICAL SURGERY
Payer: OTHER GOVERNMENT

## 2018-12-03 ENCOUNTER — ANESTHESIA (OUTPATIENT)
Dept: OPERATING ROOM | Age: 48
End: 2018-12-03
Payer: OTHER GOVERNMENT

## 2018-12-03 ENCOUNTER — APPOINTMENT (OUTPATIENT)
Dept: GENERAL RADIOLOGY | Age: 48
End: 2018-12-03
Attending: NEUROLOGICAL SURGERY
Payer: OTHER GOVERNMENT

## 2018-12-03 VITALS — SYSTOLIC BLOOD PRESSURE: 96 MMHG | TEMPERATURE: 96.1 F | OXYGEN SATURATION: 91 % | DIASTOLIC BLOOD PRESSURE: 63 MMHG

## 2018-12-03 DIAGNOSIS — Z01.812 PRE-OPERATIVE LABORATORY EXAMINATION: ICD-10-CM

## 2018-12-03 DIAGNOSIS — M54.2 NECK PAIN: ICD-10-CM

## 2018-12-03 DIAGNOSIS — M50.20 CERVICAL DISC HERNIATION: ICD-10-CM

## 2018-12-03 DIAGNOSIS — M50.30 BULGING OF CERVICAL INTERVERTEBRAL DISC: Primary | ICD-10-CM

## 2018-12-03 LAB
ABO/RH: NORMAL
ANTIBODY SCREEN: NORMAL
HCT VFR BLD CALC: 39.8 % (ref 34–48)
HEMOGLOBIN: 13.5 G/DL (ref 11.5–15.5)
MCH RBC QN AUTO: 31.3 PG (ref 26–35)
MCHC RBC AUTO-ENTMCNC: 33.9 % (ref 32–34.5)
MCV RBC AUTO: 92.1 FL (ref 80–99.9)
PDW BLD-RTO: 13.6 FL (ref 11.5–15)
PLATELET # BLD: 258 E9/L (ref 130–450)
PMV BLD AUTO: 10.8 FL (ref 7–12)
RBC # BLD: 4.32 E12/L (ref 3.5–5.5)
WBC # BLD: 7 E9/L (ref 4.5–11.5)

## 2018-12-03 PROCEDURE — 88304 TISSUE EXAM BY PATHOLOGIST: CPT

## 2018-12-03 PROCEDURE — C1762 CONN TISS, HUMAN(INC FASCIA): HCPCS | Performed by: NEUROLOGICAL SURGERY

## 2018-12-03 PROCEDURE — 36415 COLL VENOUS BLD VENIPUNCTURE: CPT

## 2018-12-03 PROCEDURE — 97161 PT EVAL LOW COMPLEX 20 MIN: CPT

## 2018-12-03 PROCEDURE — 87081 CULTURE SCREEN ONLY: CPT

## 2018-12-03 PROCEDURE — G8978 MOBILITY CURRENT STATUS: HCPCS

## 2018-12-03 PROCEDURE — 86850 RBC ANTIBODY SCREEN: CPT

## 2018-12-03 PROCEDURE — 6360000002 HC RX W HCPCS: Performed by: ANESTHESIOLOGY

## 2018-12-03 PROCEDURE — 6360000002 HC RX W HCPCS: Performed by: NEUROLOGICAL SURGERY

## 2018-12-03 PROCEDURE — 3700000001 HC ADD 15 MINUTES (ANESTHESIA): Performed by: NEUROLOGICAL SURGERY

## 2018-12-03 PROCEDURE — 2720000010 HC SURG SUPPLY STERILE: Performed by: NEUROLOGICAL SURGERY

## 2018-12-03 PROCEDURE — 7100000000 HC PACU RECOVERY - FIRST 15 MIN: Performed by: NEUROLOGICAL SURGERY

## 2018-12-03 PROCEDURE — 85027 COMPLETE CBC AUTOMATED: CPT

## 2018-12-03 PROCEDURE — 3700000000 HC ANESTHESIA ATTENDED CARE: Performed by: NEUROLOGICAL SURGERY

## 2018-12-03 PROCEDURE — 2500000003 HC RX 250 WO HCPCS: Performed by: ANESTHESIOLOGIST ASSISTANT

## 2018-12-03 PROCEDURE — 3600000014 HC SURGERY LEVEL 4 ADDTL 15MIN: Performed by: NEUROLOGICAL SURGERY

## 2018-12-03 PROCEDURE — 97165 OT EVAL LOW COMPLEX 30 MIN: CPT

## 2018-12-03 PROCEDURE — 2580000003 HC RX 258: Performed by: ANESTHESIOLOGIST ASSISTANT

## 2018-12-03 PROCEDURE — 2709999900 HC NON-CHARGEABLE SUPPLY: Performed by: NEUROLOGICAL SURGERY

## 2018-12-03 PROCEDURE — 6370000000 HC RX 637 (ALT 250 FOR IP): Performed by: NEUROLOGICAL SURGERY

## 2018-12-03 PROCEDURE — 2580000003 HC RX 258: Performed by: NEUROLOGICAL SURGERY

## 2018-12-03 PROCEDURE — 3209999900 FLUORO FOR SURGICAL PROCEDURES

## 2018-12-03 PROCEDURE — 2500000003 HC RX 250 WO HCPCS: Performed by: NEUROLOGICAL SURGERY

## 2018-12-03 PROCEDURE — G8979 MOBILITY GOAL STATUS: HCPCS

## 2018-12-03 PROCEDURE — C1713 ANCHOR/SCREW BN/BN,TIS/BN: HCPCS | Performed by: NEUROLOGICAL SURGERY

## 2018-12-03 PROCEDURE — 6360000002 HC RX W HCPCS: Performed by: ANESTHESIOLOGIST ASSISTANT

## 2018-12-03 PROCEDURE — 86900 BLOOD TYPING SEROLOGIC ABO: CPT

## 2018-12-03 PROCEDURE — G8987 SELF CARE CURRENT STATUS: HCPCS

## 2018-12-03 PROCEDURE — 88311 DECALCIFY TISSUE: CPT

## 2018-12-03 PROCEDURE — L0120 CERV FLEX N/ADJ FOAM PRE OTS: HCPCS | Performed by: NEUROLOGICAL SURGERY

## 2018-12-03 PROCEDURE — 3600000004 HC SURGERY LEVEL 4 BASE: Performed by: NEUROLOGICAL SURGERY

## 2018-12-03 PROCEDURE — 7100000001 HC PACU RECOVERY - ADDTL 15 MIN: Performed by: NEUROLOGICAL SURGERY

## 2018-12-03 PROCEDURE — 86901 BLOOD TYPING SEROLOGIC RH(D): CPT

## 2018-12-03 PROCEDURE — 2580000003 HC RX 258

## 2018-12-03 PROCEDURE — G8988 SELF CARE GOAL STATUS: HCPCS

## 2018-12-03 DEVICE — SCREW 3120513 4.0 X 13 SELF DRILL VAR
Type: IMPLANTABLE DEVICE | Site: NECK | Status: FUNCTIONAL
Brand: ATLANTIS® ANTERIOR CERVICAL PLATE SYSTEM

## 2018-12-03 DEVICE — GRAFT BNE L16-40MM OD16MM UNICORTICAL DWL FRZ DRY IMPL: Type: IMPLANTABLE DEVICE | Site: NECK | Status: FUNCTIONAL

## 2018-12-03 RX ORDER — SODIUM CHLORIDE 0.9 % (FLUSH) 0.9 %
10 SYRINGE (ML) INJECTION EVERY 12 HOURS
Status: DISCONTINUED | OUTPATIENT
Start: 2018-12-03 | End: 2018-12-04 | Stop reason: HOSPADM

## 2018-12-03 RX ORDER — LIDOCAINE HYDROCHLORIDE 20 MG/ML
INJECTION, SOLUTION EPIDURAL; INFILTRATION; INTRACAUDAL; PERINEURAL PRN
Status: DISCONTINUED | OUTPATIENT
Start: 2018-12-03 | End: 2018-12-03 | Stop reason: SDUPTHER

## 2018-12-03 RX ORDER — HYDROCODONE BITARTRATE AND ACETAMINOPHEN 7.5; 325 MG/1; MG/1
1 TABLET ORAL EVERY 4 HOURS PRN
Status: DISCONTINUED | OUTPATIENT
Start: 2018-12-03 | End: 2018-12-04 | Stop reason: HOSPADM

## 2018-12-03 RX ORDER — SODIUM CHLORIDE 9 MG/ML
INJECTION, SOLUTION INTRAVENOUS CONTINUOUS PRN
Status: DISCONTINUED | OUTPATIENT
Start: 2018-12-03 | End: 2018-12-03

## 2018-12-03 RX ORDER — PROPOFOL 10 MG/ML
INJECTION, EMULSION INTRAVENOUS PRN
Status: DISCONTINUED | OUTPATIENT
Start: 2018-12-03 | End: 2018-12-03 | Stop reason: SDUPTHER

## 2018-12-03 RX ORDER — VANCOMYCIN HYDROCHLORIDE 500 MG/10ML
INJECTION, POWDER, LYOPHILIZED, FOR SOLUTION INTRAVENOUS PRN
Status: DISCONTINUED | OUTPATIENT
Start: 2018-12-03 | End: 2018-12-03 | Stop reason: HOSPADM

## 2018-12-03 RX ORDER — FENTANYL CITRATE 50 UG/ML
INJECTION, SOLUTION INTRAMUSCULAR; INTRAVENOUS PRN
Status: DISCONTINUED | OUTPATIENT
Start: 2018-12-03 | End: 2018-12-03 | Stop reason: SDUPTHER

## 2018-12-03 RX ORDER — HYDROCODONE BITARTRATE AND ACETAMINOPHEN 7.5; 325 MG/1; MG/1
1 TABLET ORAL EVERY 4 HOURS PRN
Status: DISCONTINUED | OUTPATIENT
Start: 2018-12-03 | End: 2018-12-03 | Stop reason: HOSPADM

## 2018-12-03 RX ORDER — SODIUM CHLORIDE 0.9 % (FLUSH) 0.9 %
SYRINGE (ML) INJECTION
Status: COMPLETED
Start: 2018-12-03 | End: 2018-12-03

## 2018-12-03 RX ORDER — SODIUM CHLORIDE 0.9 % (FLUSH) 0.9 %
10 SYRINGE (ML) INJECTION EVERY 12 HOURS SCHEDULED
Status: DISCONTINUED | OUTPATIENT
Start: 2018-12-03 | End: 2018-12-03 | Stop reason: HOSPADM

## 2018-12-03 RX ORDER — GLYCOPYRROLATE 1 MG/5 ML
SYRINGE (ML) INTRAVENOUS PRN
Status: DISCONTINUED | OUTPATIENT
Start: 2018-12-03 | End: 2018-12-03 | Stop reason: SDUPTHER

## 2018-12-03 RX ORDER — ACETAMINOPHEN 325 MG/1
650 TABLET ORAL EVERY 4 HOURS PRN
Status: DISCONTINUED | OUTPATIENT
Start: 2018-12-03 | End: 2018-12-04 | Stop reason: HOSPADM

## 2018-12-03 RX ORDER — SODIUM CHLORIDE 9 MG/ML
INJECTION, SOLUTION INTRAVENOUS CONTINUOUS PRN
Status: DISCONTINUED | OUTPATIENT
Start: 2018-12-03 | End: 2018-12-03 | Stop reason: SDUPTHER

## 2018-12-03 RX ORDER — MORPHINE SULFATE 2 MG/ML
2 INJECTION, SOLUTION INTRAMUSCULAR; INTRAVENOUS EVERY 5 MIN PRN
Status: DISCONTINUED | OUTPATIENT
Start: 2018-12-03 | End: 2018-12-03 | Stop reason: HOSPADM

## 2018-12-03 RX ORDER — METAXALONE 800 MG/1
800 TABLET ORAL 3 TIMES DAILY
Status: DISCONTINUED | OUTPATIENT
Start: 2018-12-03 | End: 2018-12-04 | Stop reason: HOSPADM

## 2018-12-03 RX ORDER — CEFAZOLIN SODIUM 1 G/3ML
INJECTION, POWDER, FOR SOLUTION INTRAMUSCULAR; INTRAVENOUS PRN
Status: DISCONTINUED | OUTPATIENT
Start: 2018-12-03 | End: 2018-12-03 | Stop reason: SDUPTHER

## 2018-12-03 RX ORDER — ROCURONIUM BROMIDE 10 MG/ML
INJECTION, SOLUTION INTRAVENOUS PRN
Status: DISCONTINUED | OUTPATIENT
Start: 2018-12-03 | End: 2018-12-03 | Stop reason: SDUPTHER

## 2018-12-03 RX ORDER — ZOLPIDEM TARTRATE 5 MG/1
5 TABLET ORAL NIGHTLY PRN
Status: DISCONTINUED | OUTPATIENT
Start: 2018-12-03 | End: 2018-12-04 | Stop reason: HOSPADM

## 2018-12-03 RX ORDER — NEOSTIGMINE METHYLSULFATE 1 MG/ML
INJECTION, SOLUTION INTRAVENOUS PRN
Status: DISCONTINUED | OUTPATIENT
Start: 2018-12-03 | End: 2018-12-03 | Stop reason: SDUPTHER

## 2018-12-03 RX ORDER — PROMETHAZINE HYDROCHLORIDE 25 MG/ML
6.25 INJECTION, SOLUTION INTRAMUSCULAR; INTRAVENOUS EVERY 10 MIN PRN
Status: DISCONTINUED | OUTPATIENT
Start: 2018-12-03 | End: 2018-12-03 | Stop reason: HOSPADM

## 2018-12-03 RX ORDER — SODIUM CHLORIDE 0.9 % (FLUSH) 0.9 %
10 SYRINGE (ML) INJECTION PRN
Status: DISCONTINUED | OUTPATIENT
Start: 2018-12-03 | End: 2018-12-04 | Stop reason: HOSPADM

## 2018-12-03 RX ORDER — ONDANSETRON 2 MG/ML
INJECTION INTRAMUSCULAR; INTRAVENOUS PRN
Status: DISCONTINUED | OUTPATIENT
Start: 2018-12-03 | End: 2018-12-03 | Stop reason: SDUPTHER

## 2018-12-03 RX ORDER — LIDOCAINE HYDROCHLORIDE AND EPINEPHRINE BITARTRATE 20; .01 MG/ML; MG/ML
INJECTION, SOLUTION SUBCUTANEOUS PRN
Status: DISCONTINUED | OUTPATIENT
Start: 2018-12-03 | End: 2018-12-03 | Stop reason: HOSPADM

## 2018-12-03 RX ORDER — MORPHINE SULFATE 2 MG/ML
1 INJECTION, SOLUTION INTRAMUSCULAR; INTRAVENOUS EVERY 5 MIN PRN
Status: DISCONTINUED | OUTPATIENT
Start: 2018-12-03 | End: 2018-12-03 | Stop reason: HOSPADM

## 2018-12-03 RX ORDER — HYDROCODONE BITARTRATE AND ACETAMINOPHEN 5; 325 MG/1; MG/1
1 TABLET ORAL PRN
Status: DISCONTINUED | OUTPATIENT
Start: 2018-12-03 | End: 2018-12-03

## 2018-12-03 RX ORDER — MEPERIDINE HYDROCHLORIDE 50 MG/ML
12.5 INJECTION INTRAMUSCULAR; INTRAVENOUS; SUBCUTANEOUS EVERY 5 MIN PRN
Status: DISCONTINUED | OUTPATIENT
Start: 2018-12-03 | End: 2018-12-03 | Stop reason: HOSPADM

## 2018-12-03 RX ORDER — HYDROMORPHONE HCL 110MG/55ML
PATIENT CONTROLLED ANALGESIA SYRINGE INTRAVENOUS PRN
Status: DISCONTINUED | OUTPATIENT
Start: 2018-12-03 | End: 2018-12-03 | Stop reason: SDUPTHER

## 2018-12-03 RX ORDER — MIDAZOLAM HYDROCHLORIDE 1 MG/ML
INJECTION INTRAMUSCULAR; INTRAVENOUS PRN
Status: DISCONTINUED | OUTPATIENT
Start: 2018-12-03 | End: 2018-12-03 | Stop reason: SDUPTHER

## 2018-12-03 RX ORDER — HYDROCODONE BITARTRATE AND ACETAMINOPHEN 5; 325 MG/1; MG/1
2 TABLET ORAL PRN
Status: DISCONTINUED | OUTPATIENT
Start: 2018-12-03 | End: 2018-12-03

## 2018-12-03 RX ORDER — SODIUM CHLORIDE 0.9 % (FLUSH) 0.9 %
10 SYRINGE (ML) INJECTION PRN
Status: DISCONTINUED | OUTPATIENT
Start: 2018-12-03 | End: 2018-12-03 | Stop reason: HOSPADM

## 2018-12-03 RX ADMIN — Medication 1 LOZENGE: at 21:14

## 2018-12-03 RX ADMIN — LIDOCAINE HYDROCHLORIDE 100 MG: 20 INJECTION, SOLUTION EPIDURAL; INFILTRATION; INTRACAUDAL; PERINEURAL at 08:05

## 2018-12-03 RX ADMIN — ZOLPIDEM TARTRATE 5 MG: 5 TABLET ORAL at 21:37

## 2018-12-03 RX ADMIN — FENTANYL CITRATE 100 MCG: 50 INJECTION, SOLUTION INTRAMUSCULAR; INTRAVENOUS at 08:30

## 2018-12-03 RX ADMIN — TRIMETHOBENZAMIDE HYDROCHLORIDE 200 MG: 100 INJECTION INTRAMUSCULAR at 14:24

## 2018-12-03 RX ADMIN — METAXALONE 800 MG: 800 TABLET ORAL at 16:18

## 2018-12-03 RX ADMIN — ONDANSETRON HYDROCHLORIDE 4 MG: 2 INJECTION, SOLUTION INTRAMUSCULAR; INTRAVENOUS at 09:00

## 2018-12-03 RX ADMIN — Medication 10 ML: at 21:15

## 2018-12-03 RX ADMIN — FENTANYL CITRATE 100 MCG: 50 INJECTION, SOLUTION INTRAMUSCULAR; INTRAVENOUS at 08:05

## 2018-12-03 RX ADMIN — MORPHINE SULFATE 1 MG: 2 INJECTION, SOLUTION INTRAMUSCULAR; INTRAVENOUS at 11:38

## 2018-12-03 RX ADMIN — Medication 0.2 MG: at 10:23

## 2018-12-03 RX ADMIN — HYDROMORPHONE HYDROCHLORIDE 1 MG: 2 INJECTION, SOLUTION INTRAMUSCULAR; INTRAVENOUS; SUBCUTANEOUS at 08:32

## 2018-12-03 RX ADMIN — FENTANYL CITRATE 150 MCG: 50 INJECTION, SOLUTION INTRAMUSCULAR; INTRAVENOUS at 08:13

## 2018-12-03 RX ADMIN — PHENYLEPHRINE HYDROCHLORIDE 100 MCG: 10 INJECTION INTRAVENOUS at 09:06

## 2018-12-03 RX ADMIN — MORPHINE SULFATE 1 MG: 2 INJECTION, SOLUTION INTRAMUSCULAR; INTRAVENOUS at 11:44

## 2018-12-03 RX ADMIN — METAXALONE 800 MG: 800 TABLET ORAL at 21:14

## 2018-12-03 RX ADMIN — HYDROMORPHONE HYDROCHLORIDE 1 MG: 2 INJECTION, SOLUTION INTRAMUSCULAR; INTRAVENOUS; SUBCUTANEOUS at 08:59

## 2018-12-03 RX ADMIN — HYDROCODONE BITARTRATE AND ACETAMINOPHEN 1 TABLET: 7.5; 325 TABLET ORAL at 14:24

## 2018-12-03 RX ADMIN — PHENYLEPHRINE HYDROCHLORIDE 100 MCG: 10 INJECTION INTRAVENOUS at 09:33

## 2018-12-03 RX ADMIN — SODIUM CHLORIDE: 0.9 INJECTION, SOLUTION INTRAVENOUS at 07:05

## 2018-12-03 RX ADMIN — DEXTROSE MONOHYDRATE 1 G: 5 INJECTION, SOLUTION INTRAVENOUS at 16:18

## 2018-12-03 RX ADMIN — SODIUM CHLORIDE: 0.9 INJECTION, SOLUTION INTRAVENOUS at 08:53

## 2018-12-03 RX ADMIN — ROCURONIUM BROMIDE 30 MG: 10 INJECTION INTRAVENOUS at 08:05

## 2018-12-03 RX ADMIN — PROPOFOL 200 MG: 10 INJECTION, EMULSION INTRAVENOUS at 08:05

## 2018-12-03 RX ADMIN — Medication 10 ML: at 16:18

## 2018-12-03 RX ADMIN — Medication 1 MG: at 10:23

## 2018-12-03 RX ADMIN — HYDROCODONE BITARTRATE AND ACETAMINOPHEN 1 TABLET: 7.5; 325 TABLET ORAL at 18:28

## 2018-12-03 RX ADMIN — MIDAZOLAM HYDROCHLORIDE 2 MG: 1 INJECTION, SOLUTION INTRAMUSCULAR; INTRAVENOUS at 07:50

## 2018-12-03 RX ADMIN — CEFAZOLIN 2000 MG: 1 INJECTION, POWDER, FOR SOLUTION INTRAMUSCULAR; INTRAVENOUS at 08:10

## 2018-12-03 ASSESSMENT — PULMONARY FUNCTION TESTS
PIF_VALUE: 18
PIF_VALUE: 1
PIF_VALUE: 19
PIF_VALUE: 20
PIF_VALUE: 21
PIF_VALUE: 19
PIF_VALUE: 1
PIF_VALUE: 17
PIF_VALUE: 19
PIF_VALUE: 18
PIF_VALUE: 19
PIF_VALUE: 19
PIF_VALUE: 0
PIF_VALUE: 18
PIF_VALUE: 20
PIF_VALUE: 21
PIF_VALUE: 19
PIF_VALUE: 20
PIF_VALUE: 20
PIF_VALUE: 18
PIF_VALUE: 18
PIF_VALUE: 19
PIF_VALUE: 19
PIF_VALUE: 20
PIF_VALUE: 19
PIF_VALUE: 19
PIF_VALUE: 17
PIF_VALUE: 19
PIF_VALUE: 19
PIF_VALUE: 18
PIF_VALUE: 19
PIF_VALUE: 17
PIF_VALUE: 22
PIF_VALUE: 19
PIF_VALUE: 18
PIF_VALUE: 6
PIF_VALUE: 18
PIF_VALUE: 19
PIF_VALUE: 19
PIF_VALUE: 18
PIF_VALUE: 19
PIF_VALUE: 18
PIF_VALUE: 22
PIF_VALUE: 19
PIF_VALUE: 16
PIF_VALUE: 19
PIF_VALUE: 19
PIF_VALUE: 1
PIF_VALUE: 20
PIF_VALUE: 19
PIF_VALUE: 18
PIF_VALUE: 19
PIF_VALUE: 18
PIF_VALUE: 19
PIF_VALUE: 18
PIF_VALUE: 20
PIF_VALUE: 19
PIF_VALUE: 35
PIF_VALUE: 18
PIF_VALUE: 19
PIF_VALUE: 19
PIF_VALUE: 20
PIF_VALUE: 0
PIF_VALUE: 21
PIF_VALUE: 18
PIF_VALUE: 21
PIF_VALUE: 19
PIF_VALUE: 19
PIF_VALUE: 18
PIF_VALUE: 17
PIF_VALUE: 18
PIF_VALUE: 16
PIF_VALUE: 20
PIF_VALUE: 19
PIF_VALUE: 18
PIF_VALUE: 19
PIF_VALUE: 1
PIF_VALUE: 19
PIF_VALUE: 6
PIF_VALUE: 18
PIF_VALUE: 19
PIF_VALUE: 18
PIF_VALUE: 20
PIF_VALUE: 19
PIF_VALUE: 19
PIF_VALUE: 18
PIF_VALUE: 18
PIF_VALUE: 19
PIF_VALUE: 20
PIF_VALUE: 19
PIF_VALUE: 0
PIF_VALUE: 3
PIF_VALUE: 19
PIF_VALUE: 19
PIF_VALUE: 18
PIF_VALUE: 19
PIF_VALUE: 20
PIF_VALUE: 19
PIF_VALUE: 18
PIF_VALUE: 20
PIF_VALUE: 19
PIF_VALUE: 17
PIF_VALUE: 25
PIF_VALUE: 20
PIF_VALUE: 16
PIF_VALUE: 18
PIF_VALUE: 17
PIF_VALUE: 19
PIF_VALUE: 2
PIF_VALUE: 18
PIF_VALUE: 20
PIF_VALUE: 19
PIF_VALUE: 19
PIF_VALUE: 20
PIF_VALUE: 49
PIF_VALUE: 19
PIF_VALUE: 20
PIF_VALUE: 18
PIF_VALUE: 19
PIF_VALUE: 19
PIF_VALUE: 18
PIF_VALUE: 20
PIF_VALUE: 17
PIF_VALUE: 19
PIF_VALUE: 18
PIF_VALUE: 17
PIF_VALUE: 17
PIF_VALUE: 19
PIF_VALUE: 17
PIF_VALUE: 18
PIF_VALUE: 18
PIF_VALUE: 21
PIF_VALUE: 17
PIF_VALUE: 19
PIF_VALUE: 17
PIF_VALUE: 21
PIF_VALUE: 18
PIF_VALUE: 19
PIF_VALUE: 16
PIF_VALUE: 19
PIF_VALUE: 19
PIF_VALUE: 18

## 2018-12-03 ASSESSMENT — PAIN SCALES - GENERAL
PAINLEVEL_OUTOF10: 0
PAINLEVEL_OUTOF10: 6
PAINLEVEL_OUTOF10: 0
PAINLEVEL_OUTOF10: 6
PAINLEVEL_OUTOF10: 0
PAINLEVEL_OUTOF10: 6
PAINLEVEL_OUTOF10: 0

## 2018-12-03 ASSESSMENT — PAIN DESCRIPTION - PAIN TYPE
TYPE: SURGICAL PAIN
TYPE: ACUTE PAIN
TYPE: ACUTE PAIN

## 2018-12-03 ASSESSMENT — PAIN DESCRIPTION - ORIENTATION
ORIENTATION: POSTERIOR
ORIENTATION: ANTERIOR
ORIENTATION: ANTERIOR

## 2018-12-03 ASSESSMENT — PAIN DESCRIPTION - PROGRESSION: CLINICAL_PROGRESSION: GRADUALLY IMPROVING

## 2018-12-03 ASSESSMENT — PAIN DESCRIPTION - DESCRIPTORS
DESCRIPTORS: ACHING;CONSTANT;THROBBING
DESCRIPTORS: CONSTANT
DESCRIPTORS: ACHING;DISCOMFORT;DULL;NAGGING
DESCRIPTORS: ACHING;DISCOMFORT

## 2018-12-03 ASSESSMENT — PAIN - FUNCTIONAL ASSESSMENT: PAIN_FUNCTIONAL_ASSESSMENT: 0-10

## 2018-12-03 ASSESSMENT — PAIN DESCRIPTION - LOCATION
LOCATION: NECK

## 2018-12-03 ASSESSMENT — PAIN DESCRIPTION - FREQUENCY
FREQUENCY: INTERMITTENT
FREQUENCY: INTERMITTENT

## 2018-12-03 ASSESSMENT — PAIN DESCRIPTION - ONSET: ONSET: GRADUAL

## 2018-12-03 NOTE — PLAN OF CARE
Problem: Cerebrospinal Fluid Leakage - Risk Of:  Goal: Absence of cerebrospinal fluid drainage at surgical site  Absence of cerebrospinal fluid drainage at surgical site   Outcome: Met This Shift      Problem: Infection - Surgical Site:  Goal: Will show no infection signs and symptoms  Will show no infection signs and symptoms   Outcome: Met This Shift      Problem: Mobility - Impaired:  Goal: Mobility will improve to maximum level  Mobility will improve to maximum level   Outcome: Met This Shift      Problem: Pain - Acute:  Goal: Pain level will decrease  Pain level will decrease    Outcome: Not Met This Shift      Problem: Sensory Perception - Impaired:  Goal: Sensory function intact, lower extremity  Sensory function intact, lower extremity   Outcome: Met This Shift      Problem: Nutritional:  Goal: Consumption of food in small portions  Consumption of food in small portions   Outcome: Met This Shift      Problem: Respiratory:  Goal: Will remain free from infection  Will remain free from infection   Outcome: Met This Shift    Goal: Absence of aspiration  Absence of aspiration   Outcome: Met This Shift      Problem: Pain:  Goal: Control of acute pain  Control of acute pain   Outcome: Not Met This Shift    Goal: Control of chronic pain  Control of chronic pain   Outcome: Not Met This Shift    Goal: Pain level will decrease  Pain level will decrease    Outcome: Not Met This Shift

## 2018-12-03 NOTE — ANESTHESIA PRE PROCEDURE
09/09/2018    BUN 12 09/09/2018    CREATININE 0.7 09/09/2018    GFRAA >60 09/09/2018    LABGLOM >60 09/09/2018    GLUCOSE 100 09/09/2018    GLUCOSE 100 03/21/2012    PROT 7.0 09/09/2018    CALCIUM 9.5 09/09/2018    BILITOT 0.5 09/09/2018    ALKPHOS 59 09/09/2018    AST 31 09/09/2018    ALT 11 09/09/2018       POC Tests: No results for input(s): POCGLU, POCNA, POCK, POCCL, POCBUN, POCHEMO, POCHCT in the last 72 hours. Coags:   Lab Results   Component Value Date    PROTIME 12.6 03/21/2012    INR 1.4 03/21/2012       HCG (If Applicable):   Lab Results   Component Value Date    PREGTESTUR NEGATIVE 09/09/2018        ABGs: No results found for: PHART, PO2ART, WRN3FQZ, IWV6EPZ, BEART, Q9EALSWW     Type & Screen (If Applicable):  No results found for: LABABO, LABRH     EKG 9/07/2018    Atrial fibrillation  Nonspecific T wave abnormality  Abnormal ECG  No previous ECGs available    Echo 03/21/2012    Summary    LVEF 65%    Normal LV size and systolic function. Physiologic and/or trace mitral regurgitation is present. Chest xray 09/07/2018    No evidence of acute pulmonary disease. Anesthesia Evaluation  Patient summary reviewed and Nursing notes reviewed no history of anesthetic complications:   Airway: Mallampati: I  TM distance: <3 FB   Neck ROM: limited  Mouth opening: > = 3 FB Dental:      Comment: Pt denies any missing, loose, chipped, or cracked teeth    Pulmonary:normal exam                               Cardiovascular:  Exercise tolerance: good (>4 METS),         ECG reviewed  Rhythm: regular  Rate: normal  Echocardiogram reviewed         Beta Blocker:  Not on Beta Blocker      ROS comment: HX: pt states, \"I have low blood pressure. Sometimes my top number is in the low 70s low 80s baseline. \"- Liberty Ort    EKG from 9/2018 showed a-fib, pt currently not on any medication for it and is currently in sinus bradycardia.     PE comment: bradycardia   Neuro/Psych:   (+) seizures:, neuromuscular disease:, depression/anxiety              ROS comment: HX: fibromyalgia, DJD, one seizure in summer of 2018 d/t electrolyte abnormalities per pt, taking keppra as prescribed until she sees the neurologist in Feb 2019 GI/Hepatic/Renal:   (+) hiatal hernia, GERD: well controlled, PUD,           Endo/Other:    (+) : arthritis: no interval change. , . Abdominal:       Abdomen: soft. Vascular:                                    Anesthesia Plan      general     ASA 3       Induction: intravenous. BIS  MIPS: Postoperative opioids intended and Prophylactic antiemetics administered. Anesthetic plan and risks discussed with patient. Use of blood products discussed with patient whom consented to blood products. Plan discussed with CRNA and attending. French Mcneil RN   12/3/2018        Pt seen, examined, chart reviewed, plan discussed.   Denisha Bedolla  12/3/2018  8:12 AM

## 2018-12-03 NOTE — PROGRESS NOTES
Physical Therapy  Initial Assessment   [facility]S NEURO SPINE    Name: Krystina Pritchard  : 1970  MRN: [id number]    Date of Service: 12/3/2018    Evaluating PT:  Simon Fraga, PT [id number]    Room #:  3356/5800-X  Diagnosis:  Cervical disc herniation  Surgery: 12/3/18 CERVICAL  FUSION ANTERIOR C5-6      Diagnosis Date    Arthritis     Bleeding ulcer 2002    Depression     DJD (degenerative joint disease)     Fibromyalgia     GERD (gastroesophageal reflux disease)     Hiatal hernia     Osteoarthritis     Osteopenia     Seizure ([address] 2012              Procedure Laterality Date    APPENDECTOMY      BACK SURGERY      dexter x2 in [address] [place] [address]      reduction 6lbs each    CHOLECYSTECTOMY      COLONOSCOPY      ECHO COMPL W DOP COLOR FLOW  3/21/2012         ENDOMETRIAL ABLATION      twice    ENDOSCOPY, COLON, DIAGNOSTIC      FRACTURE SURGERY      HYSTERECTOMY      KNEE ARTHROSCOPY Left     x 3    NERVE BLOCK      [id number]    CT ARTHRODESIS ANT INTERBODY INC DISCECTOMY, CERVICAL BELOW C2 N/A 12/3/2018    CERVICAL  FUSION ANTERIOR C5-6, WITH PLATE, SCREWS, BONE GRAFT, MEDTRONIC ---BOP performed by Remigio Owen MD at [facility]     Precautions:  Soft cervical collar, Spinal neutral mechanics. Equipment Needs:  none    Pt lives with Boyfriend and son in a mobile home with 1 rail. Bed is on 1 floor and bath is on 1 floor. Pt ambulated with Ind PTA. Equipment owned: none     Initial Evaluation  Date: 12/3/18 Treatment Short Term/ Long Term   Goals   AM-PAC 6 Clicks      Was pt agreeable to Eval/treatment? yes     Does pt have pain? Yes 7/10 States headache pain is decreased. Bed Mobility  Rolling: NT  Supine to sit: Sup  Sit to supine: NT  Scooting: Sup  Rolling: Ind  Supine to sit: Ind  Sit to supine: Ind  Scooting: Ind   Transfers Sit to stand: Sup  Stand to sit: Sup  Stand pivot: Sup  Sit to stand: Mod Ind  Stand to sit: Mod Ind  Stand pivot:  Mod Ind
Povidine-iodine 5% nasal antiseptic pre-op prep completed 15 seconds per nare and repeated. 2% CHG pre-op skin prep completed in appropriate order using all 6 cloths:   With 1st cloth, wipe the neck, chest, and abdomen.  Scrub inside nd  around the navel/belly-button area. With 2nd cloth, wipe both arms, starting each with the shoulder  and ending at the fingertips.  Be sure to thoroughly wipe the arm  pit area. With 3rd cloth, wipe the right and left hip followed by the groin. Be sure to wipe folds in the abdominal and groin areas. With 4th cloth, wipe both legs, starting at the thigh and ending   at the toes.   Be sure to thoroughly wipe behind the knees. With 5th cloth, wipe the back starting at the base of the neck and   Ending at the waist line.    With 6th cloth, wipe the buttocks.
Soft collar on.  Bhavani Meadows
Mobility Supervision household distance no AD  Independent no AD   Balance Sitting:     Static:  good    Dynamic:good  Standing: good     Activity Tolerance Good-  good   Visual/  Perceptual Glasses: reading         Safety Good-                 good     Hand dominance: R   UE ROM: RUE:  WFL  LUE:  WFL  Strength: RUE:  4+/5 LUE:  4+/5   Strength: B WFL  Fine Motor Coordination:  WFL    Hearing: WFL  Sensation:  No c/o numbness or tingling  Tone:  WFL  Edema: none noted                            Comments/Treatment: Upon arrival, patient supine and agreeable to evaluation. OT evaluation performed with  Education on cervical precautions and ADL completion, bed mobility, bathroom safety. At end of session, patient sitting up in chair and with call light and phone within reach, all lines and tubes intact. OT for functional assessment of  ADL, Functional Transfer/Mobility Training, Equipment Needs, Energy Conservation Techniques, Pt/Family Education, Ther Ex- deep breathing for edema and pain control., OT role and POC reviewed. Patient  demo good- understanding of cervical yes and no's and limited movement and no lifting.       Eval Complexity: low    Assessment of current deficits   Functional mobility [x]  ADLs [x] Strength []  Cognition []  Functional transfers  [x] IADLs [x] Safety Awareness [x]  Endurance [x]  Fine Motor Coordination [] Balance [] Vision/perception [] Sensation []   Gross Motor Coordination [] ROM [] Delirium []                  Motor Control []    Plan of Care:   ADL retraining [x]   Equipment needs [x]   Neuromuscular re-education [] Energy Conservation Techniques [x]  Functional Transfer training [x] Patient and/or Family Education [x]  Functional Mobility training [x]  Environmental Modifications [x]  Cognitive re-training []   Compensatory techniques for ADLs [x]  Splinting Needs []   Positioning to improve overall function [x]   Therapeutic Activity [x]  Therapeutic Exercise
remainder of the day due to having had anesthesia. PARKING INSTRUCTIONS:   · [x] Arrival Time 0500[x] Parking lot 1 is located on [facility] [place] (the corner of [facility] and [facility] [place]). To enter, press the button and the gate will lift. A free token will be provided to exit the lot. One car per patient is allowed to park in this lot. All other cars are to park on [address] [place] [address] Street either in the parking garage or the handicap lot. [] Free  parking is available on [address] [place] [address]. · [] To reach the [facility] lobby from [address] [place] [address], upon entering the hospital, take elevator B to the 3rd floor. EDUCATION INSTRUCTIONS:      [] Knee or hip replacement booklet & exercise pamphlets given. [] Sarita Spaulding placed in chart. [] Pre-admission Testing educational folder given  [] Incentive Spirometry,coughing & deep breathing exercises reviewed. []Medication information sheet(s)   []Fluoroscopy-Xray used in surgery reviewed with patient. Educational pamphlet placed in chart. []Pain: Post-op pain is normal and to be expected. You will be asked to rate your pain from 0-10(a zero is not acceptable-education is needed). Your post-op pain goal is:  [] Ask your nurse for your pain medication. [] Joint camp offered. [] Joint replacement booklets given. []Other     MEDICATION INSTRUCTIONS:   [x]Bring a complete list of your medications, please write the last time you took the medicine, give this list to the nurse. [x] Take the following medications the morning of surgery with 1-2 ounces of water:   [] Stop herbal supplements and vitamins 5 days before your surgery. [] DO NOT take any diabetic medicine the morning of surgery. Follow instructions for insulin the day before surgery. [] If you are diabetic and your blood sugar is low or you feel symptomatic, you may drink 1-2 ounces of apple juice or take a glucose tablet.   The morning of your procedure, you may call

## 2018-12-04 ENCOUNTER — APPOINTMENT (OUTPATIENT)
Dept: CT IMAGING | Age: 48
End: 2018-12-04
Attending: NEUROLOGICAL SURGERY
Payer: OTHER GOVERNMENT

## 2018-12-04 VITALS
WEIGHT: 155 LBS | HEART RATE: 52 BPM | HEIGHT: 64 IN | TEMPERATURE: 97.4 F | BODY MASS INDEX: 26.46 KG/M2 | DIASTOLIC BLOOD PRESSURE: 66 MMHG | SYSTOLIC BLOOD PRESSURE: 118 MMHG | RESPIRATION RATE: 15 BRPM | OXYGEN SATURATION: 98 %

## 2018-12-04 LAB — MRSA CULTURE ONLY: NORMAL

## 2018-12-04 PROCEDURE — 72125 CT NECK SPINE W/O DYE: CPT

## 2018-12-04 PROCEDURE — 6360000002 HC RX W HCPCS: Performed by: NEUROLOGICAL SURGERY

## 2018-12-04 PROCEDURE — 2580000003 HC RX 258: Performed by: NEUROLOGICAL SURGERY

## 2018-12-04 PROCEDURE — 6370000000 HC RX 637 (ALT 250 FOR IP): Performed by: NEUROLOGICAL SURGERY

## 2018-12-04 RX ORDER — METAXALONE 800 MG/1
800 TABLET ORAL 3 TIMES DAILY
Qty: 60 TABLET | Refills: 0 | Status: SHIPPED | OUTPATIENT
Start: 2018-12-04 | End: 2018-12-14

## 2018-12-04 RX ORDER — HYDROCODONE BITARTRATE AND ACETAMINOPHEN 7.5; 325 MG/1; MG/1
1 TABLET ORAL EVERY 4 HOURS PRN
Qty: 40 TABLET | Refills: 0 | Status: SHIPPED | OUTPATIENT
Start: 2018-12-04 | End: 2018-12-11

## 2018-12-04 RX ADMIN — HYDROCODONE BITARTRATE AND ACETAMINOPHEN 1 TABLET: 7.5; 325 TABLET ORAL at 07:31

## 2018-12-04 RX ADMIN — METAXALONE 800 MG: 800 TABLET ORAL at 08:58

## 2018-12-04 RX ADMIN — Medication 10 ML: at 08:59

## 2018-12-04 RX ADMIN — TRIMETHOBENZAMIDE HYDROCHLORIDE 200 MG: 100 INJECTION INTRAMUSCULAR at 16:12

## 2018-12-04 RX ADMIN — METAXALONE 800 MG: 800 TABLET ORAL at 13:27

## 2018-12-04 RX ADMIN — HYDROCODONE BITARTRATE AND ACETAMINOPHEN 1 TABLET: 7.5; 325 TABLET ORAL at 17:05

## 2018-12-04 RX ADMIN — TRIMETHOBENZAMIDE HYDROCHLORIDE 200 MG: 100 INJECTION INTRAMUSCULAR at 07:31

## 2018-12-04 RX ADMIN — HYDROCODONE BITARTRATE AND ACETAMINOPHEN 1 TABLET: 7.5; 325 TABLET ORAL at 12:20

## 2018-12-04 ASSESSMENT — PAIN SCALES - GENERAL
PAINLEVEL_OUTOF10: 6
PAINLEVEL_OUTOF10: 8
PAINLEVEL_OUTOF10: 3
PAINLEVEL_OUTOF10: 9
PAINLEVEL_OUTOF10: 3

## 2018-12-04 ASSESSMENT — PAIN DESCRIPTION - DESCRIPTORS
DESCRIPTORS: ACHING;DISCOMFORT;CONSTANT
DESCRIPTORS: ACHING;DISCOMFORT;CONSTANT

## 2018-12-04 ASSESSMENT — PAIN DESCRIPTION - LOCATION
LOCATION: NECK
LOCATION: NECK

## 2018-12-04 ASSESSMENT — PAIN DESCRIPTION - PAIN TYPE
TYPE: ACUTE PAIN
TYPE: ACUTE PAIN

## 2019-02-06 ENCOUNTER — TELEPHONE (OUTPATIENT)
Dept: NEUROLOGY | Age: 49
End: 2019-02-06

## 2019-04-10 ENCOUNTER — APPOINTMENT (OUTPATIENT)
Dept: CT IMAGING | Age: 49
End: 2019-04-10
Payer: OTHER GOVERNMENT

## 2019-04-10 ENCOUNTER — HOSPITAL ENCOUNTER (EMERGENCY)
Age: 49
Discharge: HOME | End: 2019-04-10
Payer: OTHER GOVERNMENT

## 2019-04-10 VITALS
HEIGHT: 64 IN | TEMPERATURE: 98.4 F | BODY MASS INDEX: 29.02 KG/M2 | WEIGHT: 170 LBS | HEART RATE: 76 BPM | OXYGEN SATURATION: 98 % | RESPIRATION RATE: 18 BRPM | DIASTOLIC BLOOD PRESSURE: 86 MMHG | SYSTOLIC BLOOD PRESSURE: 116 MMHG

## 2019-04-10 DIAGNOSIS — R51.9 ACUTE NONINTRACTABLE HEADACHE, UNSPECIFIED HEADACHE TYPE: Primary | ICD-10-CM

## 2019-04-10 PROCEDURE — 96375 TX/PRO/DX INJ NEW DRUG ADDON: CPT

## 2019-04-10 PROCEDURE — 99284 EMERGENCY DEPT VISIT MOD MDM: CPT

## 2019-04-10 PROCEDURE — 96374 THER/PROPH/DIAG INJ IV PUSH: CPT

## 2019-04-10 PROCEDURE — 70450 CT HEAD/BRAIN W/O DYE: CPT

## 2019-04-10 PROCEDURE — 6360000002 HC RX W HCPCS: Performed by: PHYSICIAN ASSISTANT

## 2019-04-10 PROCEDURE — 2580000003 HC RX 258: Performed by: PHYSICIAN ASSISTANT

## 2019-04-10 RX ORDER — KETOROLAC TROMETHAMINE 30 MG/ML
30 INJECTION, SOLUTION INTRAMUSCULAR; INTRAVENOUS ONCE
Status: COMPLETED | OUTPATIENT
Start: 2019-04-10 | End: 2019-04-10

## 2019-04-10 RX ORDER — 0.9 % SODIUM CHLORIDE 0.9 %
1000 INTRAVENOUS SOLUTION INTRAVENOUS ONCE
Status: COMPLETED | OUTPATIENT
Start: 2019-04-10 | End: 2019-04-10

## 2019-04-10 RX ORDER — DEXAMETHASONE SODIUM PHOSPHATE 10 MG/ML
10 INJECTION, SOLUTION INTRAMUSCULAR; INTRAVENOUS ONCE
Status: COMPLETED | OUTPATIENT
Start: 2019-04-10 | End: 2019-04-10

## 2019-04-10 RX ORDER — DIPHENHYDRAMINE HYDROCHLORIDE 50 MG/ML
25 INJECTION INTRAMUSCULAR; INTRAVENOUS ONCE
Status: COMPLETED | OUTPATIENT
Start: 2019-04-10 | End: 2019-04-10

## 2019-04-10 RX ORDER — METOCLOPRAMIDE HYDROCHLORIDE 5 MG/ML
10 INJECTION INTRAMUSCULAR; INTRAVENOUS ONCE
Status: COMPLETED | OUTPATIENT
Start: 2019-04-10 | End: 2019-04-10

## 2019-04-10 RX ORDER — BUTALBITAL, ACETAMINOPHEN AND CAFFEINE 300; 40; 50 MG/1; MG/1; MG/1
1 CAPSULE ORAL EVERY 4 HOURS PRN
Qty: 20 CAPSULE | Refills: 0 | Status: SHIPPED | OUTPATIENT
Start: 2019-04-10 | End: 2019-06-04

## 2019-04-10 RX ORDER — ONDANSETRON 2 MG/ML
4 INJECTION INTRAMUSCULAR; INTRAVENOUS ONCE
Status: COMPLETED | OUTPATIENT
Start: 2019-04-10 | End: 2019-04-10

## 2019-04-10 RX ADMIN — HYDROMORPHONE HYDROCHLORIDE 0.5 MG: 1 INJECTION, SOLUTION INTRAMUSCULAR; INTRAVENOUS; SUBCUTANEOUS at 19:32

## 2019-04-10 RX ADMIN — ONDANSETRON 4 MG: 2 INJECTION INTRAMUSCULAR; INTRAVENOUS at 19:32

## 2019-04-10 RX ADMIN — DEXAMETHASONE SODIUM PHOSPHATE 10 MG: 10 INJECTION, SOLUTION INTRAMUSCULAR; INTRAVENOUS at 18:11

## 2019-04-10 RX ADMIN — SODIUM CHLORIDE 1000 ML: 9 INJECTION, SOLUTION INTRAVENOUS at 18:10

## 2019-04-10 RX ADMIN — DIPHENHYDRAMINE HYDROCHLORIDE 25 MG: 50 INJECTION, SOLUTION INTRAMUSCULAR; INTRAVENOUS at 18:12

## 2019-04-10 RX ADMIN — METOCLOPRAMIDE 10 MG: 5 INJECTION, SOLUTION INTRAMUSCULAR; INTRAVENOUS at 18:12

## 2019-04-10 RX ADMIN — KETOROLAC TROMETHAMINE 30 MG: 30 INJECTION, SOLUTION INTRAMUSCULAR; INTRAVENOUS at 20:03

## 2019-04-10 ASSESSMENT — PAIN SCALES - GENERAL
PAINLEVEL_OUTOF10: 5
PAINLEVEL_OUTOF10: 10
PAINLEVEL_OUTOF10: 8
PAINLEVEL_OUTOF10: 4
PAINLEVEL_OUTOF10: 2

## 2019-04-10 ASSESSMENT — PAIN DESCRIPTION - DESCRIPTORS
DESCRIPTORS: HEADACHE
DESCRIPTORS: HEADACHE
DESCRIPTORS: DISCOMFORT;POUNDING

## 2019-04-10 ASSESSMENT — PAIN DESCRIPTION - PAIN TYPE
TYPE: ACUTE PAIN

## 2019-04-10 ASSESSMENT — PAIN DESCRIPTION - LOCATION
LOCATION: HEAD

## 2019-04-10 NOTE — ED PROVIDER NOTES
Independent:      HPI:  4/10/19, Time: 5:41PM.       Avi Pete is a 51 y.o. female presenting to the ED for evaluation of persistent frontal forehead and maxillary sinus pressure over the last several days. The patient states she has had persistent symptoms over the last 3 days. She states that she is having no nasal congestion or discolored drainage. She denies any fever or chills. She reports she's had no neck pain or neck stiffness. She states she has never had symptoms like this in the past it feels like her \"cheeks or crushing into her face\". She reports no visual changes. She states she recently had a seizure and is scheduled to see a neurologist for evaluation. The complaint has been persistent, moderate in severity, and worsened by nothing. She reports she has had some light sensitivity and denies any associated nausea or vomiting with her headache. She has had no paresthesias to her upper or lower extremities. She reports her headache/facial pain is severe, but not the worst HA of her life. ROS:   Pertinent positives and negatives are stated within the HPI, all other systems reviewed and are negative.    --------------------------------------------- PAST HISTORY ---------------------------------------------  Past Medical History:  has a past medical history of Arthritis, Bleeding ulcer, Depression, DJD (degenerative joint disease), Fibromyalgia, GERD (gastroesophageal reflux disease), Hiatal hernia, Osteoarthritis, Osteopenia, and Seizure ([facility] [address] Past Surgical History:  has a past surgical history that includes Hysterectomy; Endometrial ablation; Knee arthroscopy (Left); fracture surgery; ECHO Compl W Dop Color Flow (3/21/2012); Cholecystectomy; back surgery; Breast surgery; Nerve Block; Appendectomy; Colonoscopy; Endoscopy, colon, diagnostic; and pr arthrodesis ant interbody inc discectomy, cervical below c2 (N/A, 12/3/2018).     Social History:  reports that she quit smoking about 6 years Affect      ------------------------------ ED COURSE/MEDICAL DECISION MAKING----------------------  Medications   diphenhydrAMINE (BENADRYL) injection 25 mg (25 mg Intravenous Given 4/10/19 1812)   0.9 % sodium chloride bolus (0 mLs Intravenous Stopped 4/10/19 1959)   metoclopramide (REGLAN) injection 10 mg (10 mg Intravenous Given 4/10/19 1812)   dexamethasone (PF) (DECADRON) injection 10 mg (10 mg Intravenous Given 4/10/19 1811)   HYDROmorphone (DILAUDID) injection 0.5 mg (0.5 mg Intravenous Given 4/10/19 1932)   ondansetron (ZOFRAN) injection 4 mg (4 mg Intravenous Given 4/10/19 1932)   ketorolac (TORADOL) injection 30 mg (30 mg Intravenous Given 4/10/19 2003)       Medical Decision Making:    Patient to ER, complaints of persistent facial pain and pressure onset over the last 3 days. Patient advised of need to check CT imaging as well as will give her some IV fluids, Benadryl, Reglan and Decadron. Patient sleeping on and off.     7:12PM Patient rechecked, still having headache 8/10. Small dose of Dilaudid ordered. Patient resting. Patient rechecked and advised of  CT noted and stable. Patient reports after Dilaudid her headache somewhat improved, but still has pressure. No neck stiffness or rigidity noted. Patient given some IV Toradol. Patient improved after Toradol given. Patient advised of need to see her PCP as well as recommended to keep her point with neurology as scheduled. She was given small Rx for Fioricet for HA as needed. Vitals remain stable and neurologic deficits noted. Counseling: The emergency provider has spoken with the patient and discussed todays results, in addition to providing specific details for the plan of care and counseling regarding the diagnosis and prognosis. Questions are answered at this time and they are agreeable with the plan.      --------------------------------- IMPRESSION AND DISPOSITION ---------------------------------    IMPRESSION  1.  Acute

## 2019-04-10 NOTE — ED NOTES
Pt sleeping on right side, family at cart side. Pt now to CT via cart. Easily aroused.      Se Velasquez RN  04/10/19 5543

## 2019-04-10 NOTE — ED NOTES
Headache is not like usual migraine on right side of head. Face hurts at forehead and onto cheek bones with pressure at occiput. Denies injury to head. +nausea, no vomiting.      Yanet Welch RN  04/10/19 1691

## 2019-04-11 NOTE — ED NOTES
Carries full conversation. C/o pressure in head. Medicated as ordered.      Ramila Morley RN  04/10/19 2007

## 2019-06-04 ENCOUNTER — APPOINTMENT (OUTPATIENT)
Dept: CT IMAGING | Age: 49
End: 2019-06-04
Payer: OTHER GOVERNMENT

## 2019-06-04 ENCOUNTER — HOSPITAL ENCOUNTER (EMERGENCY)
Age: 49
Discharge: HOME | End: 2019-06-05
Attending: EMERGENCY MEDICINE
Payer: OTHER GOVERNMENT

## 2019-06-04 DIAGNOSIS — K52.9 GASTROENTERITIS: Primary | ICD-10-CM

## 2019-06-04 LAB
ALBUMIN SERPL-MCNC: 4.4 G/DL (ref 3.5–5.2)
ALP BLD-CCNC: 98 U/L (ref 35–104)
ALT SERPL-CCNC: 9 U/L (ref 0–32)
ANION GAP SERPL CALCULATED.3IONS-SCNC: 17 MMOL/L (ref 7–16)
AST SERPL-CCNC: 15 U/L (ref 0–31)
BASOPHILS ABSOLUTE: 0.02 E9/L (ref 0–0.2)
BASOPHILS RELATIVE PERCENT: 0.2 % (ref 0–2)
BILIRUB SERPL-MCNC: 0.4 MG/DL (ref 0–1.2)
BILIRUBIN DIRECT: 0.2 MG/DL (ref 0–0.3)
BILIRUBIN URINE: NEGATIVE
BILIRUBIN, INDIRECT: 0.2 MG/DL (ref 0–1)
BLOOD, URINE: NEGATIVE
BUN BLDV-MCNC: 18 MG/DL (ref 6–20)
CALCIUM SERPL-MCNC: 9.3 MG/DL (ref 8.6–10.2)
CHLORIDE BLD-SCNC: 104 MMOL/L (ref 98–107)
CLARITY: CLEAR
CO2: 17 MMOL/L (ref 22–29)
COLOR: YELLOW
CREAT SERPL-MCNC: 0.9 MG/DL (ref 0.5–1)
EOSINOPHILS ABSOLUTE: 0.04 E9/L (ref 0.05–0.5)
EOSINOPHILS RELATIVE PERCENT: 0.4 % (ref 0–6)
GFR AFRICAN AMERICAN: >60
GFR NON-AFRICAN AMERICAN: >60 ML/MIN/1.73
GLUCOSE BLD-MCNC: 132 MG/DL (ref 74–99)
GLUCOSE URINE: NEGATIVE MG/DL
HCG(URINE) PREGNANCY TEST: NEGATIVE
HCT VFR BLD CALC: 44.2 % (ref 34–48)
HEMOGLOBIN: 15.4 G/DL (ref 11.5–15.5)
IMMATURE GRANULOCYTES #: 0.05 E9/L
IMMATURE GRANULOCYTES %: 0.6 % (ref 0–5)
KETONES, URINE: ABNORMAL MG/DL
LACTIC ACID: 2.3 MMOL/L (ref 0.5–2.2)
LEUKOCYTE ESTERASE, URINE: NEGATIVE
LIPASE: 22 U/L (ref 13–60)
LYMPHOCYTES ABSOLUTE: 0.76 E9/L (ref 1.5–4)
LYMPHOCYTES RELATIVE PERCENT: 8.4 % (ref 20–42)
MCH RBC QN AUTO: 31.4 PG (ref 26–35)
MCHC RBC AUTO-ENTMCNC: 34.8 % (ref 32–34.5)
MCV RBC AUTO: 90.2 FL (ref 80–99.9)
MONOCYTES ABSOLUTE: 0.47 E9/L (ref 0.1–0.95)
MONOCYTES RELATIVE PERCENT: 5.2 % (ref 2–12)
NEUTROPHILS ABSOLUTE: 7.74 E9/L (ref 1.8–7.3)
NEUTROPHILS RELATIVE PERCENT: 85.2 % (ref 43–80)
NITRITE, URINE: NEGATIVE
PDW BLD-RTO: 13 FL (ref 11.5–15)
PH UA: 8.5 (ref 5–9)
PLATELET # BLD: 320 E9/L (ref 130–450)
PMV BLD AUTO: 10.7 FL (ref 7–12)
POTASSIUM REFLEX MAGNESIUM: 3.6 MMOL/L (ref 3.5–5)
PROTEIN UA: NEGATIVE MG/DL
RBC # BLD: 4.9 E12/L (ref 3.5–5.5)
SODIUM BLD-SCNC: 138 MMOL/L (ref 132–146)
SPECIFIC GRAVITY UA: 1.01 (ref 1–1.03)
TOTAL PROTEIN: 7.8 G/DL (ref 6.4–8.3)
TROPONIN: <0.01 NG/ML (ref 0–0.03)
UROBILINOGEN, URINE: 0.2 E.U./DL
WBC # BLD: 9.1 E9/L (ref 4.5–11.5)

## 2019-06-04 PROCEDURE — 83690 ASSAY OF LIPASE: CPT

## 2019-06-04 PROCEDURE — 81025 URINE PREGNANCY TEST: CPT

## 2019-06-04 PROCEDURE — 6360000002 HC RX W HCPCS: Performed by: EMERGENCY MEDICINE

## 2019-06-04 PROCEDURE — 6360000004 HC RX CONTRAST MEDICATION: Performed by: RADIOLOGY

## 2019-06-04 PROCEDURE — 81003 URINALYSIS AUTO W/O SCOPE: CPT

## 2019-06-04 PROCEDURE — 36415 COLL VENOUS BLD VENIPUNCTURE: CPT

## 2019-06-04 PROCEDURE — 2580000003 HC RX 258: Performed by: EMERGENCY MEDICINE

## 2019-06-04 PROCEDURE — 99284 EMERGENCY DEPT VISIT MOD MDM: CPT

## 2019-06-04 PROCEDURE — 93005 ELECTROCARDIOGRAM TRACING: CPT | Performed by: EMERGENCY MEDICINE

## 2019-06-04 PROCEDURE — 80076 HEPATIC FUNCTION PANEL: CPT

## 2019-06-04 PROCEDURE — 83605 ASSAY OF LACTIC ACID: CPT

## 2019-06-04 PROCEDURE — 96374 THER/PROPH/DIAG INJ IV PUSH: CPT

## 2019-06-04 PROCEDURE — 2580000003 HC RX 258: Performed by: RADIOLOGY

## 2019-06-04 PROCEDURE — 84484 ASSAY OF TROPONIN QUANT: CPT

## 2019-06-04 PROCEDURE — 80048 BASIC METABOLIC PNL TOTAL CA: CPT

## 2019-06-04 PROCEDURE — 74177 CT ABD & PELVIS W/CONTRAST: CPT

## 2019-06-04 PROCEDURE — 96375 TX/PRO/DX INJ NEW DRUG ADDON: CPT

## 2019-06-04 PROCEDURE — 85025 COMPLETE CBC W/AUTO DIFF WBC: CPT

## 2019-06-04 RX ORDER — 0.9 % SODIUM CHLORIDE 0.9 %
30 INTRAVENOUS SOLUTION INTRAVENOUS ONCE
Status: COMPLETED | OUTPATIENT
Start: 2019-06-04 | End: 2019-06-05

## 2019-06-04 RX ORDER — SODIUM CHLORIDE 0.9 % (FLUSH) 0.9 %
10 SYRINGE (ML) INJECTION PRN
Status: DISCONTINUED | OUTPATIENT
Start: 2019-06-04 | End: 2019-06-05 | Stop reason: HOSPADM

## 2019-06-04 RX ORDER — MORPHINE SULFATE 4 MG/ML
4 INJECTION, SOLUTION INTRAMUSCULAR; INTRAVENOUS ONCE
Status: COMPLETED | OUTPATIENT
Start: 2019-06-04 | End: 2019-06-04

## 2019-06-04 RX ORDER — ONDANSETRON 2 MG/ML
4 INJECTION INTRAMUSCULAR; INTRAVENOUS ONCE
Status: COMPLETED | OUTPATIENT
Start: 2019-06-04 | End: 2019-06-04

## 2019-06-04 RX ADMIN — ONDANSETRON HYDROCHLORIDE 4 MG: 2 SOLUTION INTRAMUSCULAR; INTRAVENOUS at 21:15

## 2019-06-04 RX ADMIN — Medication 10 ML: at 21:59

## 2019-06-04 RX ADMIN — SODIUM CHLORIDE 2178 ML: 9 INJECTION, SOLUTION INTRAVENOUS at 21:14

## 2019-06-04 RX ADMIN — HYDROMORPHONE HYDROCHLORIDE 0.5 MG: 1 INJECTION, SOLUTION INTRAMUSCULAR; INTRAVENOUS; SUBCUTANEOUS at 22:07

## 2019-06-04 RX ADMIN — IOPAMIDOL 100 ML: 755 INJECTION, SOLUTION INTRAVENOUS at 21:59

## 2019-06-04 RX ADMIN — MORPHINE SULFATE 4 MG: 4 INJECTION INTRAVENOUS at 21:15

## 2019-06-04 ASSESSMENT — PAIN DESCRIPTION - LOCATION
LOCATION: HIP
LOCATION: ABDOMEN

## 2019-06-04 ASSESSMENT — PAIN SCALES - GENERAL
PAINLEVEL_OUTOF10: 9
PAINLEVEL_OUTOF10: 10

## 2019-06-04 ASSESSMENT — PAIN DESCRIPTION - DESCRIPTORS
DESCRIPTORS: CONSTANT
DESCRIPTORS: SHARP;ACHING;CONSTANT

## 2019-06-04 ASSESSMENT — PAIN DESCRIPTION - PAIN TYPE
TYPE: ACUTE PAIN
TYPE: ACUTE PAIN

## 2019-06-04 ASSESSMENT — PAIN DESCRIPTION - ORIENTATION
ORIENTATION: LEFT;LOWER
ORIENTATION: LEFT

## 2019-06-04 ASSESSMENT — PAIN DESCRIPTION - DIRECTION: RADIATING_TOWARDS: LEFT THIGH

## 2019-06-05 VITALS
DIASTOLIC BLOOD PRESSURE: 64 MMHG | TEMPERATURE: 98.4 F | WEIGHT: 160 LBS | BODY MASS INDEX: 27.31 KG/M2 | OXYGEN SATURATION: 100 % | HEART RATE: 90 BPM | HEIGHT: 64 IN | RESPIRATION RATE: 18 BRPM | SYSTOLIC BLOOD PRESSURE: 118 MMHG

## 2019-06-05 LAB
EKG ATRIAL RATE: 115 BPM
EKG P AXIS: 60 DEGREES
EKG P-R INTERVAL: 128 MS
EKG Q-T INTERVAL: 314 MS
EKG QRS DURATION: 82 MS
EKG QTC CALCULATION (BAZETT): 434 MS
EKG R AXIS: 40 DEGREES
EKG T AXIS: 71 DEGREES
EKG VENTRICULAR RATE: 115 BPM

## 2019-06-05 PROCEDURE — 93010 ELECTROCARDIOGRAM REPORT: CPT | Performed by: INTERNAL MEDICINE

## 2019-06-05 RX ORDER — ONDANSETRON 4 MG/1
4 TABLET, ORALLY DISINTEGRATING ORAL EVERY 8 HOURS PRN
Qty: 12 TABLET | Refills: 0 | Status: SHIPPED | OUTPATIENT
Start: 2019-06-05 | End: 2019-10-10 | Stop reason: ALTCHOICE

## 2019-06-05 ASSESSMENT — PAIN DESCRIPTION - PAIN TYPE: TYPE: ACUTE PAIN

## 2019-06-05 ASSESSMENT — PAIN SCALES - GENERAL: PAINLEVEL_OUTOF10: 9

## 2019-06-05 ASSESSMENT — PAIN DESCRIPTION - ORIENTATION: ORIENTATION: LEFT;LOWER

## 2019-06-05 ASSESSMENT — PAIN DESCRIPTION - DIRECTION: RADIATING_TOWARDS: LEFT THIGH

## 2019-06-05 ASSESSMENT — PAIN DESCRIPTION - LOCATION: LOCATION: ABDOMEN

## 2019-06-05 ASSESSMENT — PAIN DESCRIPTION - DESCRIPTORS: DESCRIPTORS: CONSTANT

## 2019-06-05 NOTE — ED PROVIDER NOTES
Department of Emergency Medicine   ED  Provider Note  Admit Date/RoomTime: 6/4/2019  8:12 PM  ED Room: 13/13          History of Present Illness:  6/4/19, Time: 8:46 PM         Melissa Fragoso is a 47 y.o. female the past medical history of fibromyalgia, GERD, hiatal hernia, seizure, history of bleeding ulcer, osteoarthritis, osteopenia, status post partial hysterectomy with left ovary still present, status post cholecystectomy and appendectomy presenting to the ED for 3 days of watery diarrhea now with severe left lower quadrant abdominal pain, nausea, decreased oral intake and emesis. Patient states that her symptoms started approximately 3-4 days ago with diarrhea. Initially she has had some crampy abdominal pain. As the diarrhea has progressed she has been very fatigued, decreased oral intake, and states that she isn't only been able to tolerate a small amount of yogurt today. States that she feels very tired, subjective fevers today, persistent nausea but has not taking anything for this at home. Emesis with anything she eats. Diarrhea continues. She has had multiple abdominal surgeries as mentioned above. No recent travel or sick contacts. No trauma to the abdomen. Denies urinary symptoms. Was given a dose of Bactrim yesterday by her ObGyn for a left axillary infection. Otherwise no antibiotics and the symptoms started prior to this. Patient denies history of small bowel obstructions. Denies history of diverticulitis or inflammatory bowel disease.       Review of Systems:   Pertinent positives and negatives are stated within HPI, all other systems reviewed and are negative.        --------------------------------------------- PAST HISTORY ---------------------------------------------  Past Medical History:  has a past medical history of Arthritis, Bleeding ulcer, Depression, DJD (degenerative joint disease), Fibromyalgia, GERD (gastroesophageal reflux disease), Hiatal hernia, Osteoarthritis, Osteopenia, and Seizure ([facility] [address] Past Surgical History:  has a past surgical history that includes Hysterectomy; Endometrial ablation; Knee arthroscopy (Left); fracture surgery; ECHO Compl W Dop Color Flow (3/21/2012); Cholecystectomy; back surgery; Breast surgery; Nerve Block; Appendectomy; Colonoscopy; Endoscopy, colon, diagnostic; pr arthrodesis ant interbody inc discectomy, cervical below c2 (N/A, 12/3/2018); and Neck surgery (12/2018). Social History:  reports that she quit smoking about 6 years ago. She quit after 20.00 years of use. She has never used smokeless tobacco. She reports that she does not drink alcohol or use drugs. Family History: family history is not on file. The patients home medications have been reviewed. Allergies: Patient has no known allergies. ---------------------------------------------------PHYSICAL EXAM--------------------------------------    Constitutional/General: Alert and oriented x3, appears tired  Head: Normocephalic and atraumatic  Eyes: PERRL, EOMI, conjunctiva normal, sclera non icteric  Mouth: Oropharynx clear, dry, handling secretions, no trismus, no asymmetry of the posterior oropharynx or uvular edema  Neck: Supple, full ROM, no stridor, no meningeal signs  Respiratory: Lungs clear to auscultation bilaterally, no wheezes, rales, or rhonchi. Not in respiratory distress  Cardiovascular:  Heart rate 120 moves to 130 when the patient moves during examination. Regular rhythm. No murmurs, no aortic murmurs, no gallops, or rubs. 2+ distal pulses. Equal extremity pulses. Chest: No chest wall tenderness  GI:  Abdomen Soft, moderate to severe tenderness to palpation worse in the left lower quadrant, slightly less tenderness in the suprapubic and right lower quadrant. Non distended. +BS. No rebound, guarding, or rigidity. No pulsatile masses. Musculoskeletal: Moves all extremities x 4.  Warm and well perfused, no clubbing, cyanosis, or edema. Capillary refill <3 seconds  Integument: skin warm and dry. Small indurated fluctuant lesion on the left axilla concerning for abscess. Neurologic: GCS 15, no focal deficits, symmetric strength 5/5 in the upper and lower extremities bilaterally  Psychiatric: Normal Affect          -------------------------------------------------- RESULTS -------------------------------------------------  I have personally reviewed all laboratory and imaging results for this patient. Results are listed below.      LABS:  Results for orders placed or performed during the hospital encounter of 06/04/19   CBC Auto Differential   Result Value Ref Range    WBC 9.1 4.5 - 11.5 E9/L    RBC 4.90 3.50 - 5.50 E12/L    Hemoglobin 15.4 11.5 - 15.5 g/dL    Hematocrit 44.2 34.0 - 48.0 %    MCV 90.2 80.0 - 99.9 fL    MCH 31.4 26.0 - 35.0 pg    MCHC 34.8 (H) 32.0 - 34.5 %    RDW 13.0 11.5 - 15.0 fL    Platelets 851 778 - 658 E9/L    MPV 10.7 7.0 - 12.0 fL    Neutrophils % 85.2 (H) 43.0 - 80.0 %    Immature Granulocytes % 0.6 0.0 - 5.0 %    Lymphocytes % 8.4 (L) 20.0 - 42.0 %    Monocytes % 5.2 2.0 - 12.0 %    Eosinophils % 0.4 0.0 - 6.0 %    Basophils % 0.2 0.0 - 2.0 %    Neutrophils # 7.74 (H) 1.80 - 7.30 E9/L    Immature Granulocytes # 0.05 E9/L    Lymphocytes # 0.76 (L) 1.50 - 4.00 E9/L    Monocytes # 0.47 0.10 - 0.95 E9/L    Eosinophils # 0.04 (L) 0.05 - 0.50 E9/L    Basophils # 0.02 0.00 - 0.20 Q9/K   Basic Metabolic Panel w/ Reflex to MG   Result Value Ref Range    Sodium 138 132 - 146 mmol/L    Potassium reflex Magnesium 3.6 3.5 - 5.0 mmol/L    Chloride 104 98 - 107 mmol/L    CO2 17 (L) 22 - 29 mmol/L    Anion Gap 17 (H) 7 - 16 mmol/L    Glucose 132 (H) 74 - 99 mg/dL    BUN 18 6 - 20 mg/dL    CREATININE 0.9 0.5 - 1.0 mg/dL    GFR Non-African American >60 >=60 mL/min/1.73    GFR African American >60     Calcium 9.3 8.6 - 10.2 mg/dL   Hepatic Function Panel   Result Value Ref Range    Total Protein 7.8 6.4 - 8.3 g/dL    Alb 4.4 3.5 - 5.2 g/dL    Alkaline Phosphatase 98 35 - 104 U/L    ALT 9 0 - 32 U/L    AST 15 0 - 31 U/L    Total Bilirubin 0.4 0.0 - 1.2 mg/dL    Bilirubin, Direct 0.2 0.0 - 0.3 mg/dL    Bilirubin, Indirect 0.2 0.0 - 1.0 mg/dL   Lipase   Result Value Ref Range    Lipase 22 13 - 60 U/L   Pregnancy, Urine   Result Value Ref Range    HCG(Urine) Pregnancy Test NEGATIVE NEGATIVE   Urinalysis, reflex to microscopic   Result Value Ref Range    Color, UA Yellow Straw/Yellow    Clarity, UA Clear Clear    Glucose, Ur Negative Negative mg/dL    Bilirubin Urine Negative Negative    Ketones, Urine TRACE (A) Negative mg/dL    Specific Gravity, UA 1.010 1.005 - 1.030    Blood, Urine Negative Negative    pH, UA 8.5 5.0 - 9.0    Protein, UA Negative Negative mg/dL    Urobilinogen, Urine 0.2 <2.0 E.U./dL    Nitrite, Urine Negative Negative    Leukocyte Esterase, Urine Negative Negative   Lactic acid, plasma   Result Value Ref Range    Lactic Acid 2.3 (H) 0.5 - 2.2 mmol/L   Troponin   Result Value Ref Range    Troponin <0.01 0.00 - 0.03 ng/mL   EKG 12 Lead   Result Value Ref Range    Ventricular Rate 115 BPM    Atrial Rate 115 BPM    P-R Interval 128 ms    QRS Duration 82 ms    Q-T Interval 314 ms    QTc Calculation (Bazett) 434 ms    P Axis 60 degrees    R Axis 40 degrees    T Axis 71 degrees       RADIOLOGY:  Interpreted by Radiologist unless otherwise specified  CT ABDOMEN PELVIS W IV CONTRAST Additional Contrast? None   Final Result   Findings consistent with gastroenteritis.       This report has been electronically signed by Claudy Royal MD.            EKG Interpretation  Interpreted by emergency department physician, Dr. Gilberto Espinoza     Date of EK19  Time:     Rhythm: sinus tachycardia  Rate: 110-120  Axis: normal  Conduction: normal  ST Segments: flattening in  v2, v3, v4, v5, v6 and multiple  T Waves: flattening in  v2, v3, v4, v5, v6 and multiple    Clinical Impression: sinus tachycardia  Comparison to prior EKG: changes compared to previous EKG - previous flipped in V3      ------------------------- NURSING NOTES AND VITALS REVIEWED ---------------------------   The nursing notes within the ED encounter and vital signs as below have been reviewed by myself. /64   Pulse 90   Temp 98.4 °F (36.9 °C) (Oral)   Resp 18   Ht 5' 4\" (1.626 m)   Wt 160 lb (72.6 kg)   LMP 08/21/2011   SpO2 100%   BMI 27.46 kg/m²   Oxygen Saturation Interpretation: Normal    The patients available past medical records and past encounters were reviewed. ------------------------------ ED COURSE/MEDICAL DECISION MAKING----------------------  Medications   sodium chloride flush 0.9 % injection 10 mL (10 mLs Intravenous Given 6/4/19 2159)   famotidine (PEPCID) injection 20 mg (20 mg Intravenous Not Given 6/5/19 0024)   0.9 % sodium chloride IV bolus 2,178 mL (0 mL/kg × 72.6 kg Intravenous Stopped 6/5/19 0024)   morphine sulfate (PF) injection 4 mg (4 mg Intravenous Given 6/4/19 2115)   ondansetron (ZOFRAN) injection 4 mg (4 mg Intravenous Given 6/4/19 2115)   HYDROmorphone (DILAUDID) injection 0.5 mg (0.5 mg Intravenous Given 6/4/19 2207)   iopamidol (ISOVUE-370) 76 % injection 100 mL (100 mLs Intravenous Given 6/4/19 2159)              Medical Decision Making:    Norm Boys is a 47 y.o. female the past medical history of fibromyalgia, GERD, hiatal hernia, seizure, history of bleeding ulcer, osteoarthritis, osteopenia, status post partial hysterectomy with left ovary still present, status post cholecystectomy and appendectomy presenting to the ED for 3 days of watery diarrhea now with severe left lower quadrant abdominal pain, nausea, decreased oral intake and emesis.     Patient could simply have dehydration from nausea vomiting and diarrhea secondary to viral gastroenteritis, however with her severe abdominal pain prior abdominal surgeries as well as subjective fevers and concerned that she could have a diverticulitis, intra-abdominal abscess, or other concerning intra-abdominal pathology. She is tachycardic initially 160, when calm down 1:30, with dry mucous membranes. Patient will need aggressive fluid rehydration start with 30 mL/kg IV, antiemetic Zofran 4 mg IV, analgesia morphine 4 mg IV, intra-abdominal laboratory work, and a CT abdomen and pelvis with IV contrast.  Disposition pending workup and results. Pt states continued pain despite 4 mg morphine IV, will give dilaudid 0.5 mg IV. Lipase within normal limits at 22  Hepatic function panel normal  CBC shows no leukocytosis, low-grade no leukopenia, no anemia or thrombocytopenia  BMP shows a CO2 slightly low at 17, anion gap of 17 likely related to the slightly elevated lactic acid at 2.3, glucose 132  UA - trace ketones, makes sense with clinical picture  Upreg - negative  Trop - <0.01  Ct scan showed findings consistent with gastroenteritis. Pt received 4 30 mL/kg IV fluid bolus. Received Pepcid IV. Tolerated both oral liquid and solid intake. Patient feels improved. Declines I&D of the superficial abscess of the left axilla. Heart rate has improved to the mid 80s. Patient clinically appears much better. Scribe Zofran for home. Told to continue Keflex for the abscess although she needs an incision and drainage. She will call her regular doctor tomorrow. Return if symptoms worsen. This patient's ED course included: a personal history and physicial examination, re-evaluation prior to disposition, multiple bedside re-evaluations, IV medications, cardiac monitoring, continuous pulse oximetry and complex medical decision making and emergency management    This patient has improved during their ED course. Consultations:  None        Counseling: The emergency provider has spoken with the patient and spouse/SO and discussed todays results, in addition to providing specific details for the plan of care and counseling regarding the diagnosis and prognosis. Questions are answered at this time and they are agreeable with the plan.       --------------------------------- IMPRESSION AND DISPOSITION ---------------------------------    IMPRESSION  1. Gastroenteritis        DISPOSITION  Disposition: Discharge to home  Patient condition is stable        NOTE: This report was transcribed using voice recognition software.  Every effort was made to ensure accuracy; however, inadvertent computerized transcription errors may be present       Matt Alaniz MD  06/05/19 9962

## 2019-06-05 NOTE — ED NOTES
Name: Pat Coleman  : 1970  MRN: [id number]    Date: 2019    Benefits of immediately proceeding with Radiology exam outweigh the risks and therefore the following is being waived:      [x] Pregnancy test    [] Protocol for Iodine allergy    [] MRI questionnaire    [] BUN/Creatinine      Patient has had a partial hysterectomy.         MD Fransisca Figueroa MD  19 0112

## 2019-06-11 ENCOUNTER — HOSPITAL ENCOUNTER (OUTPATIENT)
Dept: GENERAL RADIOLOGY | Age: 49
Discharge: HOME | End: 2019-06-13
Payer: OTHER GOVERNMENT

## 2019-06-11 DIAGNOSIS — N63.20 BILATERAL BREAST LUMP: ICD-10-CM

## 2019-06-11 DIAGNOSIS — N63.10 BILATERAL BREAST LUMP: ICD-10-CM

## 2019-06-11 PROCEDURE — G0279 TOMOSYNTHESIS, MAMMO: HCPCS

## 2019-06-11 PROCEDURE — 76882 US LMTD JT/FCL EVL NVASC XTR: CPT

## 2019-07-24 ENCOUNTER — TELEPHONE (OUTPATIENT)
Dept: SPEECH THERAPY | Age: 49
End: 2019-07-24

## 2019-10-10 ENCOUNTER — HOSPITAL ENCOUNTER (EMERGENCY)
Age: 49
Discharge: HOME | End: 2019-10-10
Attending: FAMILY MEDICINE
Payer: OTHER GOVERNMENT

## 2019-10-10 ENCOUNTER — APPOINTMENT (OUTPATIENT)
Dept: GENERAL RADIOLOGY | Age: 49
End: 2019-10-10
Payer: OTHER GOVERNMENT

## 2019-10-10 ENCOUNTER — APPOINTMENT (OUTPATIENT)
Dept: CT IMAGING | Age: 49
End: 2019-10-10
Payer: OTHER GOVERNMENT

## 2019-10-10 VITALS
RESPIRATION RATE: 16 BRPM | SYSTOLIC BLOOD PRESSURE: 105 MMHG | HEIGHT: 64 IN | DIASTOLIC BLOOD PRESSURE: 72 MMHG | BODY MASS INDEX: 31.92 KG/M2 | HEART RATE: 88 BPM | OXYGEN SATURATION: 97 % | TEMPERATURE: 97.7 F | WEIGHT: 187 LBS

## 2019-10-10 DIAGNOSIS — R51.9 ACUTE NONINTRACTABLE HEADACHE, UNSPECIFIED HEADACHE TYPE: ICD-10-CM

## 2019-10-10 DIAGNOSIS — J01.90 ACUTE SINUSITIS, RECURRENCE NOT SPECIFIED, UNSPECIFIED LOCATION: Primary | ICD-10-CM

## 2019-10-10 PROCEDURE — 99284 EMERGENCY DEPT VISIT MOD MDM: CPT

## 2019-10-10 PROCEDURE — 96375 TX/PRO/DX INJ NEW DRUG ADDON: CPT

## 2019-10-10 PROCEDURE — 6360000002 HC RX W HCPCS: Performed by: FAMILY MEDICINE

## 2019-10-10 PROCEDURE — 71045 X-RAY EXAM CHEST 1 VIEW: CPT

## 2019-10-10 PROCEDURE — 2580000003 HC RX 258: Performed by: FAMILY MEDICINE

## 2019-10-10 PROCEDURE — 70486 CT MAXILLOFACIAL W/O DYE: CPT

## 2019-10-10 PROCEDURE — 96374 THER/PROPH/DIAG INJ IV PUSH: CPT

## 2019-10-10 PROCEDURE — 6370000000 HC RX 637 (ALT 250 FOR IP): Performed by: FAMILY MEDICINE

## 2019-10-10 RX ORDER — IPRATROPIUM BROMIDE AND ALBUTEROL SULFATE 2.5; .5 MG/3ML; MG/3ML
1 SOLUTION RESPIRATORY (INHALATION) ONCE
Status: COMPLETED | OUTPATIENT
Start: 2019-10-10 | End: 2019-10-10

## 2019-10-10 RX ORDER — PSEUDOEPHEDRINE HCL 60 MG/1
60 TABLET ORAL ONCE
Status: COMPLETED | OUTPATIENT
Start: 2019-10-10 | End: 2019-10-10

## 2019-10-10 RX ORDER — DEXAMETHASONE SODIUM PHOSPHATE 10 MG/ML
10 INJECTION, SOLUTION INTRAMUSCULAR; INTRAVENOUS ONCE
Status: COMPLETED | OUTPATIENT
Start: 2019-10-10 | End: 2019-10-10

## 2019-10-10 RX ORDER — CEFDINIR 300 MG/1
300 CAPSULE ORAL 2 TIMES DAILY
Qty: 20 CAPSULE | Refills: 0 | Status: SHIPPED | OUTPATIENT
Start: 2019-10-10 | End: 2019-10-20

## 2019-10-10 RX ORDER — ACETAMINOPHEN 500 MG
1000 TABLET ORAL ONCE
Status: COMPLETED | OUTPATIENT
Start: 2019-10-10 | End: 2019-10-10

## 2019-10-10 RX ORDER — ONDANSETRON 2 MG/ML
4 INJECTION INTRAMUSCULAR; INTRAVENOUS ONCE
Status: COMPLETED | OUTPATIENT
Start: 2019-10-10 | End: 2019-10-10

## 2019-10-10 RX ORDER — DIPHENHYDRAMINE HYDROCHLORIDE 50 MG/ML
25 INJECTION INTRAMUSCULAR; INTRAVENOUS ONCE
Status: COMPLETED | OUTPATIENT
Start: 2019-10-10 | End: 2019-10-10

## 2019-10-10 RX ORDER — 0.9 % SODIUM CHLORIDE 0.9 %
1000 INTRAVENOUS SOLUTION INTRAVENOUS ONCE
Status: COMPLETED | OUTPATIENT
Start: 2019-10-10 | End: 2019-10-10

## 2019-10-10 RX ORDER — FLUTICASONE PROPIONATE 50 MCG
1 SPRAY, SUSPENSION (ML) NASAL DAILY
Qty: 1 BOTTLE | Refills: 0 | Status: SHIPPED | OUTPATIENT
Start: 2019-10-10 | End: 2020-01-21

## 2019-10-10 RX ADMIN — ACETAMINOPHEN 1000 MG: 500 TABLET ORAL at 07:55

## 2019-10-10 RX ADMIN — IPRATROPIUM BROMIDE AND ALBUTEROL SULFATE 1 AMPULE: .5; 3 SOLUTION RESPIRATORY (INHALATION) at 06:32

## 2019-10-10 RX ADMIN — SODIUM CHLORIDE 1000 ML: 9 INJECTION, SOLUTION INTRAVENOUS at 06:31

## 2019-10-10 RX ADMIN — DIPHENHYDRAMINE HYDROCHLORIDE 25 MG: 50 INJECTION, SOLUTION INTRAMUSCULAR; INTRAVENOUS at 06:32

## 2019-10-10 RX ADMIN — DEXAMETHASONE SODIUM PHOSPHATE 10 MG: 10 INJECTION, SOLUTION INTRAMUSCULAR; INTRAVENOUS at 06:31

## 2019-10-10 RX ADMIN — ONDANSETRON 4 MG: 2 INJECTION INTRAMUSCULAR; INTRAVENOUS at 06:31

## 2019-10-10 RX ADMIN — PSEUDOEPHEDRINE HYDROCHLORIDE 60 MG: 60 TABLET, FILM COATED ORAL at 07:55

## 2019-10-10 ASSESSMENT — PAIN DESCRIPTION - LOCATION: LOCATION: HEAD

## 2019-10-10 ASSESSMENT — PAIN DESCRIPTION - PROGRESSION: CLINICAL_PROGRESSION: NOT CHANGED

## 2019-10-10 ASSESSMENT — PAIN SCALES - GENERAL
PAINLEVEL_OUTOF10: 10
PAINLEVEL_OUTOF10: 9

## 2019-10-10 ASSESSMENT — PAIN - FUNCTIONAL ASSESSMENT: PAIN_FUNCTIONAL_ASSESSMENT: PREVENTS OR INTERFERES SOME ACTIVE ACTIVITIES AND ADLS

## 2019-10-10 ASSESSMENT — PAIN DESCRIPTION - DESCRIPTORS: DESCRIPTORS: CONSTANT;SHARP

## 2019-10-10 ASSESSMENT — PAIN DESCRIPTION - ONSET: ONSET: ON-GOING

## 2019-10-10 ASSESSMENT — PAIN DESCRIPTION - FREQUENCY: FREQUENCY: CONTINUOUS

## 2019-10-13 ENCOUNTER — HOSPITAL ENCOUNTER (EMERGENCY)
Age: 49
Discharge: HOME | End: 2019-10-13
Attending: FAMILY MEDICINE
Payer: OTHER GOVERNMENT

## 2019-10-13 VITALS
HEIGHT: 64 IN | WEIGHT: 185 LBS | TEMPERATURE: 98.2 F | OXYGEN SATURATION: 99 % | HEART RATE: 80 BPM | BODY MASS INDEX: 31.58 KG/M2 | DIASTOLIC BLOOD PRESSURE: 84 MMHG | SYSTOLIC BLOOD PRESSURE: 126 MMHG | RESPIRATION RATE: 16 BRPM

## 2019-10-13 DIAGNOSIS — J20.9 ACUTE BRONCHITIS, UNSPECIFIED ORGANISM: ICD-10-CM

## 2019-10-13 DIAGNOSIS — R19.7 DIARRHEA, UNSPECIFIED TYPE: Primary | ICD-10-CM

## 2019-10-13 LAB
ALBUMIN SERPL-MCNC: 4.4 G/DL (ref 3.5–5.2)
ALP BLD-CCNC: 87 U/L (ref 35–104)
ALT SERPL-CCNC: 13 U/L (ref 0–32)
ANION GAP SERPL CALCULATED.3IONS-SCNC: 13 MMOL/L (ref 7–16)
AST SERPL-CCNC: 14 U/L (ref 0–31)
BASOPHILS ABSOLUTE: 0.03 E9/L (ref 0–0.2)
BASOPHILS RELATIVE PERCENT: 0.4 % (ref 0–2)
BILIRUB SERPL-MCNC: 0.3 MG/DL (ref 0–1.2)
BUN BLDV-MCNC: 16 MG/DL (ref 6–20)
CALCIUM SERPL-MCNC: 9.6 MG/DL (ref 8.6–10.2)
CHLORIDE BLD-SCNC: 107 MMOL/L (ref 98–107)
CO2: 22 MMOL/L (ref 22–29)
CREAT SERPL-MCNC: 0.7 MG/DL (ref 0.5–1)
EOSINOPHILS ABSOLUTE: 0.14 E9/L (ref 0.05–0.5)
EOSINOPHILS RELATIVE PERCENT: 1.9 % (ref 0–6)
GFR AFRICAN AMERICAN: >60
GFR NON-AFRICAN AMERICAN: >60 ML/MIN/1.73
GLUCOSE BLD-MCNC: 96 MG/DL (ref 74–99)
HCT VFR BLD CALC: 39.5 % (ref 34–48)
HEMOGLOBIN: 13.5 G/DL (ref 11.5–15.5)
IMMATURE GRANULOCYTES #: 0.07 E9/L
IMMATURE GRANULOCYTES %: 1 % (ref 0–5)
LYMPHOCYTES ABSOLUTE: 2.14 E9/L (ref 1.5–4)
LYMPHOCYTES RELATIVE PERCENT: 29.2 % (ref 20–42)
MCH RBC QN AUTO: 31.2 PG (ref 26–35)
MCHC RBC AUTO-ENTMCNC: 34.2 % (ref 32–34.5)
MCV RBC AUTO: 91.2 FL (ref 80–99.9)
MONOCYTES ABSOLUTE: 0.56 E9/L (ref 0.1–0.95)
MONOCYTES RELATIVE PERCENT: 7.6 % (ref 2–12)
NEUTROPHILS ABSOLUTE: 4.4 E9/L (ref 1.8–7.3)
NEUTROPHILS RELATIVE PERCENT: 59.9 % (ref 43–80)
PDW BLD-RTO: 13.7 FL (ref 11.5–15)
PLATELET # BLD: 292 E9/L (ref 130–450)
PMV BLD AUTO: 10.4 FL (ref 7–12)
POTASSIUM SERPL-SCNC: 4 MMOL/L (ref 3.5–5)
RBC # BLD: 4.33 E12/L (ref 3.5–5.5)
SODIUM BLD-SCNC: 142 MMOL/L (ref 132–146)
TOTAL PROTEIN: 7.6 G/DL (ref 6.4–8.3)
WBC # BLD: 7.3 E9/L (ref 4.5–11.5)

## 2019-10-13 PROCEDURE — 6370000000 HC RX 637 (ALT 250 FOR IP): Performed by: FAMILY MEDICINE

## 2019-10-13 PROCEDURE — 2580000003 HC RX 258: Performed by: FAMILY MEDICINE

## 2019-10-13 PROCEDURE — 99284 EMERGENCY DEPT VISIT MOD MDM: CPT

## 2019-10-13 PROCEDURE — 36415 COLL VENOUS BLD VENIPUNCTURE: CPT

## 2019-10-13 PROCEDURE — 80053 COMPREHEN METABOLIC PANEL: CPT

## 2019-10-13 PROCEDURE — 85025 COMPLETE CBC W/AUTO DIFF WBC: CPT

## 2019-10-13 RX ORDER — 0.9 % SODIUM CHLORIDE 0.9 %
1000 INTRAVENOUS SOLUTION INTRAVENOUS ONCE
Status: COMPLETED | OUTPATIENT
Start: 2019-10-13 | End: 2019-10-13

## 2019-10-13 RX ORDER — IPRATROPIUM BROMIDE AND ALBUTEROL SULFATE 2.5; .5 MG/3ML; MG/3ML
1 SOLUTION RESPIRATORY (INHALATION) ONCE
Status: COMPLETED | OUTPATIENT
Start: 2019-10-13 | End: 2019-10-13

## 2019-10-13 RX ORDER — ALBUTEROL SULFATE 90 UG/1
2 AEROSOL, METERED RESPIRATORY (INHALATION) EVERY 6 HOURS PRN
Qty: 1 INHALER | Refills: 0 | Status: SHIPPED | OUTPATIENT
Start: 2019-10-13 | End: 2020-01-21

## 2019-10-13 RX ORDER — 0.9 % SODIUM CHLORIDE 0.9 %
1000 INTRAVENOUS SOLUTION INTRAVENOUS ONCE
Status: DISCONTINUED | OUTPATIENT
Start: 2019-10-13 | End: 2019-10-13

## 2019-10-13 RX ORDER — BROMPHENIRAMINE MALEATE, PSEUDOEPHEDRINE HYDROCHLORIDE, AND DEXTROMETHORPHAN HYDROBROMIDE 2; 30; 10 MG/5ML; MG/5ML; MG/5ML
5 SYRUP ORAL 4 TIMES DAILY PRN
Qty: 120 ML | Refills: 0 | Status: SHIPPED | OUTPATIENT
Start: 2019-10-13 | End: 2020-01-21

## 2019-10-13 RX ORDER — IPRATROPIUM BROMIDE AND ALBUTEROL SULFATE 2.5; .5 MG/3ML; MG/3ML
SOLUTION RESPIRATORY (INHALATION)
Status: DISCONTINUED
Start: 2019-10-13 | End: 2019-10-13 | Stop reason: HOSPADM

## 2019-10-13 RX ORDER — IPRATROPIUM BROMIDE AND ALBUTEROL SULFATE 2.5; .5 MG/3ML; MG/3ML
1 SOLUTION RESPIRATORY (INHALATION) ONCE
Status: DISCONTINUED | OUTPATIENT
Start: 2019-10-13 | End: 2019-10-13

## 2019-10-13 RX ADMIN — IPRATROPIUM BROMIDE AND ALBUTEROL SULFATE 1 AMPULE: .5; 3 SOLUTION RESPIRATORY (INHALATION) at 08:38

## 2019-10-13 RX ADMIN — SODIUM CHLORIDE 1000 ML: 9 INJECTION, SOLUTION INTRAVENOUS at 08:42

## 2019-10-13 RX ADMIN — IPRATROPIUM BROMIDE AND ALBUTEROL SULFATE 1 AMPULE: .5; 3 SOLUTION RESPIRATORY (INHALATION) at 10:22

## 2019-10-13 ASSESSMENT — PAIN SCALES - GENERAL: PAINLEVEL_OUTOF10: 6

## 2019-10-13 ASSESSMENT — PAIN DESCRIPTION - FREQUENCY: FREQUENCY: CONTINUOUS

## 2019-10-13 ASSESSMENT — PAIN DESCRIPTION - PAIN TYPE: TYPE: ACUTE PAIN

## 2019-10-13 ASSESSMENT — PAIN DESCRIPTION - PROGRESSION: CLINICAL_PROGRESSION: NOT CHANGED

## 2019-10-13 ASSESSMENT — PAIN DESCRIPTION - DESCRIPTORS: DESCRIPTORS: HEAVINESS

## 2019-10-13 ASSESSMENT — PAIN DESCRIPTION - LOCATION: LOCATION: CHEST

## 2019-10-13 ASSESSMENT — PAIN DESCRIPTION - ONSET: ONSET: GRADUAL

## 2019-10-23 ENCOUNTER — HOSPITAL ENCOUNTER (OUTPATIENT)
Dept: MRI IMAGING | Age: 49
Discharge: HOME | End: 2019-10-25
Payer: OTHER GOVERNMENT

## 2019-10-23 DIAGNOSIS — M50.20 CERVICAL DISC HERNIATION: ICD-10-CM

## 2019-10-23 PROCEDURE — 6360000004 HC RX CONTRAST MEDICATION: Performed by: RADIOLOGY

## 2019-10-23 PROCEDURE — A9579 GAD-BASE MR CONTRAST NOS,1ML: HCPCS | Performed by: RADIOLOGY

## 2019-10-23 PROCEDURE — 72156 MRI NECK SPINE W/O & W/DYE: CPT

## 2019-10-23 RX ADMIN — GADOTERIDOL 17 ML: 279.3 INJECTION, SOLUTION INTRAVENOUS at 13:13

## 2019-11-14 RX ORDER — DIPHENHYDRAMINE HYDROCHLORIDE 50 MG/ML
50 INJECTION INTRAMUSCULAR; INTRAVENOUS ONCE
Status: CANCELLED | OUTPATIENT
Start: 2019-11-15

## 2019-11-15 ENCOUNTER — HOSPITAL ENCOUNTER (OUTPATIENT)
Dept: CT IMAGING | Age: 49
Discharge: HOME | End: 2019-11-17
Payer: OTHER GOVERNMENT

## 2019-11-15 ENCOUNTER — HOSPITAL ENCOUNTER (OUTPATIENT)
Dept: GENERAL RADIOLOGY | Age: 49
Discharge: HOME | End: 2019-11-17
Payer: OTHER GOVERNMENT

## 2019-11-15 VITALS
DIASTOLIC BLOOD PRESSURE: 53 MMHG | TEMPERATURE: 98.1 F | BODY MASS INDEX: 30.73 KG/M2 | WEIGHT: 180 LBS | SYSTOLIC BLOOD PRESSURE: 105 MMHG | HEIGHT: 64 IN | HEART RATE: 55 BPM | RESPIRATION RATE: 16 BRPM | OXYGEN SATURATION: 98 %

## 2019-11-15 DIAGNOSIS — M50.20 CERVICAL DISC HERNIATION: ICD-10-CM

## 2019-11-15 DIAGNOSIS — M43.12 SPONDYLOLISTHESIS, CERVICAL REGION: ICD-10-CM

## 2019-11-15 DIAGNOSIS — R52 PAIN: ICD-10-CM

## 2019-11-15 LAB
APPEARANCE CSF: CLEAR
COLOR CSF: COLORLESS
GLUCOSE, CSF: 56 MG/DL (ref 40–70)
MONOCYTE, CSF: 0 % (ref 10–70)
NEUTROPHILS, CSF: 100 % (ref 0–10)
PROTEIN CSF: 24 MG/DL (ref 15–40)
RBC CSF: <2000 /UL
TUBE NUMBER CSF: ABNORMAL
WBC CSF: <3 /UL (ref 0–2)

## 2019-11-15 PROCEDURE — 72126 CT NECK SPINE W/DYE: CPT

## 2019-11-15 PROCEDURE — 72240 MYELOGRAPHY NECK SPINE: CPT

## 2019-11-15 PROCEDURE — 82945 GLUCOSE OTHER FLUID: CPT

## 2019-11-15 PROCEDURE — 6360000004 HC RX CONTRAST MEDICATION: Performed by: NEUROLOGICAL SURGERY

## 2019-11-15 PROCEDURE — 7100000011 HC PHASE II RECOVERY - ADDTL 15 MIN

## 2019-11-15 PROCEDURE — 7100000010 HC PHASE II RECOVERY - FIRST 15 MIN

## 2019-11-15 PROCEDURE — 89051 BODY FLUID CELL COUNT: CPT

## 2019-11-15 PROCEDURE — 61055 INJECTION INTO BRAIN CANAL: CPT

## 2019-11-15 PROCEDURE — 84157 ASSAY OF PROTEIN OTHER: CPT

## 2019-11-15 PROCEDURE — 6360000002 HC RX W HCPCS: Performed by: NEUROLOGICAL SURGERY

## 2019-11-15 RX ORDER — ATROPINE SULFATE 0.4 MG/ML
0.4 AMPUL (ML) INJECTION ONCE
Status: COMPLETED | OUTPATIENT
Start: 2019-11-15 | End: 2019-11-15

## 2019-11-15 RX ORDER — DIPHENHYDRAMINE HYDROCHLORIDE 50 MG/ML
50 INJECTION INTRAMUSCULAR; INTRAVENOUS ONCE
Status: COMPLETED | OUTPATIENT
Start: 2019-11-15 | End: 2019-11-15

## 2019-11-15 RX ADMIN — DIPHENHYDRAMINE HYDROCHLORIDE 50 MG: 50 INJECTION, SOLUTION INTRAMUSCULAR; INTRAVENOUS at 07:41

## 2019-11-15 RX ADMIN — IOPAMIDOL 10 ML: 612 INJECTION, SOLUTION INTRATHECAL at 09:19

## 2019-11-15 RX ADMIN — ATROPINE SULFATE 0.4 MG: 0.4 INJECTION, SOLUTION INTRAMUSCULAR; INTRAVENOUS; SUBCUTANEOUS at 07:41

## 2019-11-18 ENCOUNTER — PREP FOR PROCEDURE (OUTPATIENT)
Dept: SURGERY | Age: 49
End: 2019-11-18

## 2019-11-18 RX ORDER — SODIUM CHLORIDE 0.9 % (FLUSH) 0.9 %
10 SYRINGE (ML) INJECTION EVERY 12 HOURS SCHEDULED
Status: CANCELLED | OUTPATIENT
Start: 2019-11-18

## 2019-11-18 RX ORDER — SODIUM CHLORIDE 0.9 % (FLUSH) 0.9 %
10 SYRINGE (ML) INJECTION PRN
Status: CANCELLED | OUTPATIENT
Start: 2019-11-18

## 2019-11-19 ENCOUNTER — TELEPHONE (OUTPATIENT)
Dept: CARDIAC CATH/INVASIVE PROCEDURES | Age: 49
End: 2019-11-19

## 2019-12-27 ENCOUNTER — HOSPITAL ENCOUNTER (EMERGENCY)
Age: 49
Discharge: HOME | End: 2019-12-27
Attending: EMERGENCY MEDICINE
Payer: OTHER GOVERNMENT

## 2019-12-27 VITALS
DIASTOLIC BLOOD PRESSURE: 73 MMHG | WEIGHT: 180 LBS | SYSTOLIC BLOOD PRESSURE: 132 MMHG | OXYGEN SATURATION: 98 % | RESPIRATION RATE: 18 BRPM | HEIGHT: 64 IN | BODY MASS INDEX: 30.73 KG/M2 | HEART RATE: 87 BPM | TEMPERATURE: 98.5 F

## 2019-12-27 DIAGNOSIS — H57.12 LEFT EYE PAIN: Primary | ICD-10-CM

## 2019-12-27 DIAGNOSIS — M50.20 CERVICAL DISC HERNIATION: ICD-10-CM

## 2019-12-27 PROCEDURE — 99282 EMERGENCY DEPT VISIT SF MDM: CPT

## 2019-12-27 PROCEDURE — 6360000002 HC RX W HCPCS: Performed by: PHYSICIAN ASSISTANT

## 2019-12-27 PROCEDURE — 96372 THER/PROPH/DIAG INJ SC/IM: CPT

## 2019-12-27 PROCEDURE — 6370000000 HC RX 637 (ALT 250 FOR IP): Performed by: PHYSICIAN ASSISTANT

## 2019-12-27 RX ORDER — METHYLPREDNISOLONE 4 MG/1
TABLET ORAL
Qty: 1 KIT | Status: SHIPPED | OUTPATIENT
Start: 2019-12-27 | End: 2020-01-02

## 2019-12-27 RX ORDER — KETOROLAC TROMETHAMINE 30 MG/ML
30 INJECTION, SOLUTION INTRAMUSCULAR; INTRAVENOUS ONCE
Status: COMPLETED | OUTPATIENT
Start: 2019-12-27 | End: 2019-12-27

## 2019-12-27 RX ORDER — CYCLOBENZAPRINE HCL 5 MG
5 TABLET ORAL 2 TIMES DAILY PRN
Qty: 10 TABLET | Refills: 0 | Status: SHIPPED | OUTPATIENT
Start: 2019-12-27 | End: 2020-01-06

## 2019-12-27 RX ORDER — TETRACAINE HYDROCHLORIDE 5 MG/ML
1 SOLUTION OPHTHALMIC ONCE
Status: COMPLETED | OUTPATIENT
Start: 2019-12-27 | End: 2019-12-27

## 2019-12-27 RX ADMIN — TETRACAINE HYDROCHLORIDE 1 DROP: 5 SOLUTION OPHTHALMIC at 16:40

## 2019-12-27 RX ADMIN — KETOROLAC TROMETHAMINE 30 MG: 30 INJECTION, SOLUTION INTRAMUSCULAR; INTRAVENOUS at 16:37

## 2019-12-27 ASSESSMENT — TONOMETRY: OS_IOP_MMHG: 14

## 2019-12-27 ASSESSMENT — ENCOUNTER SYMPTOMS
SHORTNESS OF BREATH: 0
EYE DISCHARGE: 0
EYE ITCHING: 0
EYE PAIN: 1
PHOTOPHOBIA: 0
COUGH: 0
BACK PAIN: 0
CHEST TIGHTNESS: 0
EYE REDNESS: 0
COLOR CHANGE: 1

## 2019-12-27 ASSESSMENT — PAIN DESCRIPTION - LOCATION: LOCATION: HEAD;EYE

## 2019-12-27 ASSESSMENT — VISUAL ACUITY
OU: 20/40
OD: 20/20
OS: 20/40

## 2019-12-27 ASSESSMENT — PAIN DESCRIPTION - PROGRESSION: CLINICAL_PROGRESSION: GRADUALLY WORSENING

## 2019-12-27 ASSESSMENT — PAIN DESCRIPTION - FREQUENCY: FREQUENCY: CONTINUOUS

## 2019-12-27 ASSESSMENT — PAIN SCALES - GENERAL
PAINLEVEL_OUTOF10: 8
PAINLEVEL_OUTOF10: 9

## 2019-12-27 ASSESSMENT — PAIN DESCRIPTION - DESCRIPTORS: DESCRIPTORS: ACHING

## 2019-12-27 ASSESSMENT — PAIN DESCRIPTION - PAIN TYPE: TYPE: ACUTE PAIN

## 2020-01-21 ENCOUNTER — HOSPITAL ENCOUNTER (OUTPATIENT)
Dept: ULTRASOUND IMAGING | Age: 50
Discharge: HOME | End: 2020-01-23
Payer: OTHER GOVERNMENT

## 2020-01-21 ENCOUNTER — HOSPITAL ENCOUNTER (OUTPATIENT)
Age: 50
Discharge: HOME | End: 2020-01-23
Payer: OTHER GOVERNMENT

## 2020-01-21 ENCOUNTER — HOSPITAL ENCOUNTER (EMERGENCY)
Age: 50
Discharge: HOME | End: 2020-01-21
Payer: OTHER GOVERNMENT

## 2020-01-21 VITALS
TEMPERATURE: 98.3 F | BODY MASS INDEX: 31.58 KG/M2 | HEIGHT: 64 IN | WEIGHT: 185 LBS | DIASTOLIC BLOOD PRESSURE: 86 MMHG | OXYGEN SATURATION: 100 % | SYSTOLIC BLOOD PRESSURE: 132 MMHG | HEART RATE: 96 BPM | RESPIRATION RATE: 14 BRPM

## 2020-01-21 PROCEDURE — 93971 EXTREMITY STUDY: CPT

## 2020-01-21 PROCEDURE — 99283 EMERGENCY DEPT VISIT LOW MDM: CPT

## 2020-01-21 RX ORDER — NAPROXEN 500 MG/1
500 TABLET ORAL 2 TIMES DAILY PRN
Qty: 14 TABLET | Refills: 0 | Status: SHIPPED | OUTPATIENT
Start: 2020-01-21 | End: 2020-06-16

## 2020-01-21 RX ORDER — IBUPROFEN 800 MG/1
800 TABLET ORAL ONCE
Status: DISCONTINUED | OUTPATIENT
Start: 2020-01-21 | End: 2020-01-21 | Stop reason: HOSPADM

## 2020-01-21 ASSESSMENT — PAIN DESCRIPTION - DESCRIPTORS: DESCRIPTORS: CONSTANT

## 2020-01-21 ASSESSMENT — PAIN DESCRIPTION - PAIN TYPE: TYPE: ACUTE PAIN

## 2020-01-21 ASSESSMENT — PAIN DESCRIPTION - ORIENTATION: ORIENTATION: LEFT

## 2020-01-21 ASSESSMENT — PAIN - FUNCTIONAL ASSESSMENT: PAIN_FUNCTIONAL_ASSESSMENT: PREVENTS OR INTERFERES SOME ACTIVE ACTIVITIES AND ADLS

## 2020-01-21 ASSESSMENT — PAIN DESCRIPTION - FREQUENCY: FREQUENCY: CONTINUOUS

## 2020-01-21 ASSESSMENT — PAIN DESCRIPTION - ONSET: ONSET: ON-GOING

## 2020-01-21 ASSESSMENT — PAIN SCALES - GENERAL: PAINLEVEL_OUTOF10: 6

## 2020-01-21 ASSESSMENT — PAIN DESCRIPTION - LOCATION: LOCATION: LEG

## 2020-01-22 NOTE — ED PROVIDER NOTES
[facility]         Department of Emergency Medicine   ED  Provider Note  Admit Date/RoomTime: 1/21/2020  5:12 PM  ED Room: HARPER/HARPER    Chief Complaint   Leg Pain (Left leg swelling, onset yesterday)    History of Present Illness   Source of history provided by:  patient. History/Exam Limitations: none. Chinedu Marx is a 47 y.o. old female who has a past medical history of:   Past Medical History:   Diagnosis Date    Arthritis     Bleeding ulcer 2002    Depression     DJD (degenerative joint disease)     Fibromyalgia     GERD (gastroesophageal reflux disease)     Hiatal hernia     Osteoarthritis     Osteopenia     Seizure ([address] 03/2012        presents to the emergency department by private vehicle with significant other, for gradual onset of Left leg pain and swelling  extremity which occured 1 day(s) prior to arrival.  Previous pain/swelling to above mentioned area no. Since onset the symptoms have been constant and moderate in severity. She denies any direct injury to the leg denies any recent travel, recent surgeries or redness. There has been no fever or chills or difficulty ambulating. Denies any previous episodes. Associated Signs & Symptoms:     []  Fever     []  Cough     []  Dyspnea     []  Hemoptysis     []  Chest Pain     Wells' Score Criteria for DVT: (possible score ? 2 to 9)      Active cancer (treatment within last 6 months or palliative) no (0)     Calf swelling ? 3 cm compared to asymptomatic calf (measured 10 cm             below tibial tuberosity) no (0)     Swollen unilateral superficial veins (non-varicose, in symptomatic leg) no (0)     Unilateral pitting edema (in symptomatic leg): no (0)     Previous documented DVT: no (0)     Swelling of entire leg no (0)      Localized tenderness along the deep venous system yes (1)     Paralysis, paresis, or recent cast immobilization of lower extremities no (0)     Recently bedridden ?  3 days, or major surgery requiring regional or                    general anesthetic in the past 12 weeks no (0)     Alternative diagnosis at least as likely no (0)     TOTAL SCORE 1     * Those with Wells scores of two or more have a 28% chance of having DVT, those with    a lower score have 6% odds. ** Alternatively, Wells scores can be categorized as high if greater than two, moderate if      one or two, and low if less than one, with likelihoods of 53%, 17%, and 5%     respectively. Wells' Criteria for PE (possible score 0 to 12.5)       Clinical Signs & Symptoms of DVT   No      PE is #1 Dx or Equally Likely   No      Heart Rate >100   No      Immobillization at least 3 days or     Surgery in past 4 Weeks   No      Previous Diagnosed PE or DVT   No      Hemoptysis   No      Malignancy w/Tx in Past 6 Months or     Palliative Tx   No      TOTAL SCORE   0     Low Risk Group: 0-1.5 =  1.3-3 % incidence of PE  Mod Risk Group: 2-3.5 =  16.2 % Incidence of PE  Mod Risk Group: > 4 = 28% Incidence of PE  High Risk Group >5 = 40.6% Incidence of PE    ROS    Pertinent positives and negatives are stated within HPI, all other systems reviewed and are negative. Past Surgical History:  has a past surgical history that includes Hysterectomy; Endometrial ablation; Knee arthroscopy (Left); fracture surgery; ECHO Compl W Dop Color Flow (3/21/2012); Cholecystectomy; back surgery; Breast surgery; Nerve Block; Appendectomy; Colonoscopy; Endoscopy, colon, diagnostic; pr arthrodesis ant interbody inc discectomy, cervical below c2 (N/A, 12/3/2018); and Neck surgery (12/2018). Social History:  reports that she quit smoking about 7 years ago. She quit after 20.00 years of use. She has never used smokeless tobacco. She reports that she does not drink alcohol or use drugs. Family History: family history is not on file. Allergies: Patient has no known allergies.     Physical Exam          ED Triage Vitals   BP Temp Temp Source Pulse Resp SpO2 Height Weight 20 1632 20 1632 20 1632 20 1632 20 1632 20 1632 20 1640 20 1640   132/86 98.3 °F (36.8 °C) Oral 138 14 100 % 5' 4\" (1.626 m) 185 lb (83.9 kg)     Oxygen Saturation Interpretation: Normal.    Constitutional:  Alert, development consistent with age. HEENT:  NC/NT. Airway patent. Neck:  Normal ROM. Supple. Respiratory:  Clear to auscultation and breath sounds equal.  CV:  Regular rate and rhythm, normal heart sounds, without pathological murmurs, ectopy, gallops, or rubs. GI:  Abdomen Soft, nontender, good bowel sounds. No firm or pulsatile mass. Lower Ext.:  Left: leg. Tenderness: Mild to the lateral left thigh with no bony tenderness. Swelling: None. Deformity: No.             ROM: full range of motion. Calf:  Left, No evidence of DVT seen on physical exam. Negative Mary's sign. Left calf tenderness. Edema:  none Both lower extremity(s). Skin:  no erythema, rash or swelling noted. Distal Function:              Motor deficit: none. Sensory deficit: none; full sensation intact to light touch in distal toes. Pulse deficit: none; 2 + pedal and posterior tibial pulses intact. Capillary refill: normal; less than 3 seconds in distal toes. Integument:  Normal turgor. Warm, dry, without visible rash, unless noted elsewhere. Neurological:  Oriented. Motor functions intact. Lab / Imaging Results   (All laboratory and radiology results have been personally reviewed by myself)  Labs:  No results found for this visit on 20. Imaging: All Radiology results interpreted by Radiologist unless otherwise noted.   No orders to display     Patient MRN: [id number]   : 1970   Age:  49 years   Gender: Female   Order Date: 2020 8:18 PM   Exam: US DUP LOWER EXTREMITY LEFT RAF   Number of Images: 24 views   Indication:  Q38.447 Left leg pain Patient is a wait and hold until results are back and call Dianelys Moya [phone] and have patient talk to Tonio Espino the NP so she can give   results and instruct her further. Comparison: None.       TECHNIQUE:    Region of study: Left lower extremity. Veins in the region of study were interrogated at multiple levels   using B-mode ultrasound with compression and with Doppler-derived   velocity spectrum waveform analysis. Response to distal compression   with flow augmentation was evaluated in the major axial veins of the   thigh.       FINDINGS:        COMMON FEMORAL VEIN: Patent.       FEMORAL VEIN: Patent.       DEEP FEMORAL VEIN: Patent.       POPLITEAL VEIN: Patent.       TIBIAL VEINS: Patent.       DEEP CALF VEINS: Patent.       SUPERFICIAL VEINS: Patent.        OTHER: Negative.           Impression   No evidence of left lower extremity deep venous thrombosis.               ED Course / Medical Decision Making     Medications - No data to display          2057  Consult(s):   None    Procedure(s):   none    MDM:   Patient complaining of left leg pain and swelling I do appreciate any swelling on examination she has no neurologic or sensory deficits on examination distal pulses intact foot is warm to touch she does have left lateral thigh discomfort may be varicosities but no signs of erythema or warmth. Patient sent to [facility] to rule out DVT. Patient was given Toradol for symptomatic relief and Motrin for discharge. Ultrasound was negative for DVT and patient will be discharged home with follow-up with her PCP. Counseling: The emergency provider has spoken with the patient and significant other discussed todays results, in addition to providing specific details for the plan of care and counseling regarding the diagnosis and prognosis. Questions are answered at this time and they are agreeable with the plan. Assessment      1.  Left leg pain      Plan   Discharge to [facility]

## 2020-06-16 ENCOUNTER — HOSPITAL ENCOUNTER (EMERGENCY)
Age: 50
Discharge: HOME | End: 2020-06-16
Attending: EMERGENCY MEDICINE
Payer: OTHER GOVERNMENT

## 2020-06-16 VITALS
HEIGHT: 64 IN | HEART RATE: 92 BPM | SYSTOLIC BLOOD PRESSURE: 122 MMHG | BODY MASS INDEX: 33.97 KG/M2 | TEMPERATURE: 97.4 F | DIASTOLIC BLOOD PRESSURE: 80 MMHG | WEIGHT: 199 LBS | RESPIRATION RATE: 18 BRPM | OXYGEN SATURATION: 97 %

## 2020-06-16 PROCEDURE — 99282 EMERGENCY DEPT VISIT SF MDM: CPT

## 2020-06-16 PROCEDURE — 96372 THER/PROPH/DIAG INJ SC/IM: CPT

## 2020-06-16 PROCEDURE — 6360000002 HC RX W HCPCS: Performed by: EMERGENCY MEDICINE

## 2020-06-16 PROCEDURE — 6370000000 HC RX 637 (ALT 250 FOR IP): Performed by: EMERGENCY MEDICINE

## 2020-06-16 RX ORDER — KETOROLAC TROMETHAMINE 30 MG/ML
30 INJECTION, SOLUTION INTRAMUSCULAR; INTRAVENOUS ONCE
Status: COMPLETED | OUTPATIENT
Start: 2020-06-16 | End: 2020-06-16

## 2020-06-16 RX ORDER — OXYCODONE HYDROCHLORIDE AND ACETAMINOPHEN 5; 325 MG/1; MG/1
1 TABLET ORAL ONCE
Status: COMPLETED | OUTPATIENT
Start: 2020-06-16 | End: 2020-06-16

## 2020-06-16 RX ORDER — OXYCODONE HYDROCHLORIDE AND ACETAMINOPHEN 5; 325 MG/1; MG/1
1 TABLET ORAL EVERY 6 HOURS PRN
Qty: 10 TABLET | Refills: 0 | Status: SHIPPED | OUTPATIENT
Start: 2020-06-16 | End: 2020-06-19

## 2020-06-16 RX ORDER — IBUPROFEN 800 MG/1
800 TABLET ORAL EVERY 6 HOURS PRN
Qty: 20 TABLET | Refills: 0 | Status: SHIPPED | OUTPATIENT
Start: 2020-06-16 | End: 2020-06-23

## 2020-06-16 RX ADMIN — OXYCODONE AND ACETAMINOPHEN 1 TABLET: 5; 325 TABLET ORAL at 18:25

## 2020-06-16 RX ADMIN — KETOROLAC TROMETHAMINE 30 MG: 30 INJECTION, SOLUTION INTRAMUSCULAR at 18:24

## 2020-06-16 ASSESSMENT — PAIN DESCRIPTION - DESCRIPTORS: DESCRIPTORS: DISCOMFORT

## 2020-06-16 ASSESSMENT — PAIN DESCRIPTION - LOCATION: LOCATION: ARM

## 2020-06-16 ASSESSMENT — PAIN DESCRIPTION - ORIENTATION: ORIENTATION: LEFT

## 2020-06-16 ASSESSMENT — PAIN SCALES - GENERAL
PAINLEVEL_OUTOF10: 8
PAINLEVEL_OUTOF10: 7

## 2020-06-16 ASSESSMENT — PAIN DESCRIPTION - PAIN TYPE: TYPE: ACUTE PAIN

## 2020-06-16 NOTE — ED PROVIDER NOTES
06/16/20. RADIOLOGY:  Interpreted by Radiologist.  No orders to display       ------------------------- NURSING NOTES AND VITALS REVIEWED ---------------------------    The nursing notes within the ED encounter and vital signs as below have been reviewed. /80   Pulse 92   Temp 97.4 °F (36.3 °C)   Resp 18   Ht 5' 4\" (1.626 m)   Wt 199 lb (90.3 kg)   LMP 08/21/2011   SpO2 97%   BMI 34.16 kg/m²   Oxygen Saturation Interpretation: Normal    ---------------------------------------------------PHYSICAL EXAM--------------------------------------    Constitutional/General: Alert and oriented x3, well appearing, non toxic in moderate distress  Head: Normocephalic and atraumatic  Eyes: PERRL, EOMI  Mouth: Oropharynx clear, handling secretions, no trismus  Neck: Supple, full ROM,   Pulmonary: Lungs clear to auscultation bilaterally, no wheezes, rales, or rhonchi. Not in respiratory distress  Cardiovascular:  Regular rate and rhythm, no murmurs, gallops, or rubs. 2+ distal pulses  Abdomen: Soft, non tender, non distended, no HPSM, no masses, no rebound, no guarding, no rigidity. Normal BS  Extremities: Moves all extremities x 4. Warm and well perfused; no c/c/e. Good pulses. + Tenderness to palpation L Biceps tendon proximally. + pain on passive ROM to abduction of the L shoulder  Skin: warm and dry without rash  Neurologic: GCS 15, CN's grossly intact, no focal deficits. Psych: Normal Affect      ------------------------------ ED COURSE/MEDICAL DECISION MAKING----------------------  Medications   ketorolac (TORADOL) injection 30 mg (30 mg Intramuscular Given 6/16/20 1824)   oxyCODONE-acetaminophen (PERCOCET) 5-325 MG per tablet 1 tablet (1 tablet Oral Given 6/16/20 1825)       ED COURSE:     Medical Decision Making:   Differential Diagnoses:  Shoulder Impingement, Rotator cuff strain, Biceps Tendonitis, to name a few. Pt. Has not had any h/o recent trauma/falls, thus Xray studies are not felt warranted. Pt. Has not had any chest discomfort and no h/o  DM to warrant Cardiac workup. Counseling: The emergency provider has spoken with the patient and discussed todays results, in addition to providing specific details for the plan of care and counseling regarding the diagnosis and prognosis. Questions are answered at this time and they are agreeable with the plan. Controlled Substance Monitoring:  Acute and Chronic Pain Monitoring:   RX Monitoring 6/16/2020   Periodic Controlled Substance Monitoring No signs of potential drug abuse or diversion identified.         --------------------------------- IMPRESSION AND DISPOSITION ---------------------------------    IMPRESSION  1. Impingement syndrome of left shoulder    2. Biceps tendinitis of left upper extremity        DISPOSITION  Disposition: Discharge to home  Patient condition is stable      NOTE: This report was transcribed using voice recognition software.  Every effort was made to ensure accuracy; however, inadvertent computerized transcription errors may be present        Hetal Hooper MD  06/16/20 Lorena Gross MD  06/16/20 0715

## 2020-06-23 ENCOUNTER — HOSPITAL ENCOUNTER (EMERGENCY)
Age: 50
Discharge: HOME | End: 2020-06-23
Attending: EMERGENCY MEDICINE
Payer: OTHER GOVERNMENT

## 2020-06-23 ENCOUNTER — HOSPITAL ENCOUNTER (OUTPATIENT)
Dept: CT IMAGING | Age: 50
Discharge: HOME | End: 2020-06-25
Payer: OTHER GOVERNMENT

## 2020-06-23 VITALS
HEART RATE: 70 BPM | OXYGEN SATURATION: 94 % | SYSTOLIC BLOOD PRESSURE: 126 MMHG | BODY MASS INDEX: 33.97 KG/M2 | DIASTOLIC BLOOD PRESSURE: 80 MMHG | HEIGHT: 64 IN | WEIGHT: 199 LBS | RESPIRATION RATE: 16 BRPM | TEMPERATURE: 97.5 F

## 2020-06-23 PROCEDURE — 74177 CT ABD & PELVIS W/CONTRAST: CPT

## 2020-06-23 PROCEDURE — 99283 EMERGENCY DEPT VISIT LOW MDM: CPT

## 2020-06-23 PROCEDURE — 6370000000 HC RX 637 (ALT 250 FOR IP): Performed by: EMERGENCY MEDICINE

## 2020-06-23 PROCEDURE — 2500000003 HC RX 250 WO HCPCS: Performed by: RADIOLOGY

## 2020-06-23 PROCEDURE — 6360000004 HC RX CONTRAST MEDICATION: Performed by: RADIOLOGY

## 2020-06-23 RX ORDER — SODIUM CHLORIDE 0.9 % (FLUSH) 0.9 %
10 SYRINGE (ML) INJECTION
Status: ACTIVE | OUTPATIENT
Start: 2020-06-23 | End: 2020-06-23

## 2020-06-23 RX ORDER — HYDROCODONE BITARTRATE AND ACETAMINOPHEN 5; 325 MG/1; MG/1
1 TABLET ORAL ONCE
Status: COMPLETED | OUTPATIENT
Start: 2020-06-23 | End: 2020-06-23

## 2020-06-23 RX ADMIN — IOPAMIDOL 110 ML: 755 INJECTION, SOLUTION INTRAVENOUS at 07:58

## 2020-06-23 RX ADMIN — HYDROCODONE BITARTRATE AND ACETAMINOPHEN 1 TABLET: 5; 325 TABLET ORAL at 14:15

## 2020-06-23 RX ADMIN — BARIUM SULFATE 1350 ML: 1 SUSPENSION ORAL at 08:00

## 2020-06-23 ASSESSMENT — PAIN DESCRIPTION - ORIENTATION: ORIENTATION: LEFT

## 2020-06-23 ASSESSMENT — PAIN DESCRIPTION - DESCRIPTORS: DESCRIPTORS: DISCOMFORT

## 2020-06-23 ASSESSMENT — PAIN SCALES - GENERAL
PAINLEVEL_OUTOF10: 8
PAINLEVEL_OUTOF10: 8

## 2020-06-23 ASSESSMENT — PAIN DESCRIPTION - LOCATION: LOCATION: HAND

## 2020-06-23 ASSESSMENT — PAIN DESCRIPTION - PAIN TYPE: TYPE: ACUTE PAIN

## 2020-06-23 NOTE — ED PROVIDER NOTES
allergies. ---------------------------------------------------PHYSICAL EXAM--------------------------------------    Constitutional:  Well developed, well nourished, no acute distress, non-toxic appearance   Eyes:  PERRL, conjunctiva normal, EOMI  HENT:  Atraumatic, external ears normal, nose normal, oropharynx moist. Neck- normal range of motion  Respiratory:  No respiratory distress   Cardiovascular:  Normal rate, normal rhythm. Radial pulses 2+ bilaterally. Musculoskeletal:  No edema, no deformities. Dorsum of left hand wrist and distal forearm with mild swelling but no crepitus or warmth. The swelling spares the fingers as well as the thumb. There is no visible IV injection site. No evidence of cellulitis at this time. .  Compartments are soft. She is warm and well perfused. Cap refill less than 3 seconds. Integument:  Well hydrated, no rash. Adequate perfusion. Neurologic:  Alert & oriented x 3, CN 2-12 normal, normal gait, no focal deficits noted. Left median, radial and ulnar nerves sensation intact to light touch and strength 5/5. Recurrent branch left median nerve intact. Psychiatric:  Speech and behavior appropriate       -------------------------------------------------- RESULTS -------------------------------------------------  I have personally reviewed all laboratory and imaging results for this patient. Results are listed below. LABS:  No results found for this visit on 06/23/20. RADIOLOGY:  Interpreted by Radiologist.  No orders to display     ------------------------- NURSING NOTES AND VITALS REVIEWED ---------------------------   The nursing notes within the ED encounter and vital signs as below have been reviewed by myself.   /80   Pulse 70   Temp 97.5 °F (36.4 °C) (Infrared)   Resp 16   Ht 5' 4\" (1.626 m)   Wt 199 lb (90.3 kg)   LMP 08/21/2011   SpO2 94%   BMI 34.16 kg/m²   Oxygen Saturation Interpretation: Normal    The patients available past medical records and past encounters were reviewed. ------------------------------ ED COURSE/MEDICAL DECISION MAKING----------------------  Medications   HYDROcodone-acetaminophen (NORCO) 5-325 MG per tablet 1 tablet (1 tablet Oral Given 6/23/20 1415)           Procedures:  none      Medical Decision Making: Ice pack, norco here (states has percocet at home), elevated the arm for swelling   F/u with pcp    Patient was explicitly instructed on specific signs and symptoms on which to return to the emergency room for. Patient was instructed to return to the ER for any new or worsening symptoms. Additional discharge instructions were given verbally. All questions were answered. Patient is comfortable and agreeable with discharge plan. Patient in no acute distress and non-toxic in appearance. This patient's ED course included: a personal history and physicial eaxmination    This patient has remained hemodynamically stable during their ED course. Counseling: The emergency provider has spoken with the patient and spouse/SO and discussed todays results, in addition to providing specific details for the plan of care and counseling regarding the diagnosis and prognosis. Questions are answered at this time and they are agreeable with the plan.       --------------------------------- IMPRESSION AND DISPOSITION ---------------------------------    IMPRESSION  1. Intravenous infiltration, initial encounter    2.  Injection site extravasation, initial encounter        DISPOSITION  Disposition: Discharge to home  Patient condition is stable                 Mai Burk DO  06/23/20 [id number]

## 2020-07-09 ENCOUNTER — HOSPITAL ENCOUNTER (OUTPATIENT)
Age: 50
Discharge: HOME | End: 2020-07-11
Payer: OTHER GOVERNMENT

## 2020-07-09 ENCOUNTER — HOSPITAL ENCOUNTER (OUTPATIENT)
Dept: GENERAL RADIOLOGY | Age: 50
Discharge: HOME | End: 2020-07-11
Payer: OTHER GOVERNMENT

## 2020-07-09 PROCEDURE — 74019 RADEX ABDOMEN 2 VIEWS: CPT

## 2020-07-16 ENCOUNTER — APPOINTMENT (OUTPATIENT)
Dept: CT IMAGING | Age: 50
End: 2020-07-16
Payer: OTHER GOVERNMENT

## 2020-07-16 ENCOUNTER — HOSPITAL ENCOUNTER (EMERGENCY)
Age: 50
Discharge: HOME | End: 2020-07-16
Attending: EMERGENCY MEDICINE
Payer: OTHER GOVERNMENT

## 2020-07-16 VITALS
RESPIRATION RATE: 18 BRPM | HEIGHT: 64 IN | HEART RATE: 72 BPM | WEIGHT: 195 LBS | OXYGEN SATURATION: 99 % | TEMPERATURE: 98.6 F | DIASTOLIC BLOOD PRESSURE: 74 MMHG | BODY MASS INDEX: 33.29 KG/M2 | SYSTOLIC BLOOD PRESSURE: 106 MMHG

## 2020-07-16 PROCEDURE — 70450 CT HEAD/BRAIN W/O DYE: CPT

## 2020-07-16 PROCEDURE — 2580000003 HC RX 258: Performed by: EMERGENCY MEDICINE

## 2020-07-16 PROCEDURE — 99284 EMERGENCY DEPT VISIT MOD MDM: CPT

## 2020-07-16 PROCEDURE — 6360000002 HC RX W HCPCS: Performed by: EMERGENCY MEDICINE

## 2020-07-16 PROCEDURE — 6370000000 HC RX 637 (ALT 250 FOR IP): Performed by: EMERGENCY MEDICINE

## 2020-07-16 PROCEDURE — 96374 THER/PROPH/DIAG INJ IV PUSH: CPT

## 2020-07-16 PROCEDURE — 96375 TX/PRO/DX INJ NEW DRUG ADDON: CPT

## 2020-07-16 RX ORDER — CYCLOBENZAPRINE HCL 5 MG
5 TABLET ORAL 2 TIMES DAILY PRN
Qty: 10 TABLET | Refills: 0 | Status: SHIPPED | OUTPATIENT
Start: 2020-07-16 | End: 2020-07-26

## 2020-07-16 RX ORDER — DIPHENHYDRAMINE HYDROCHLORIDE 50 MG/ML
25 INJECTION INTRAMUSCULAR; INTRAVENOUS ONCE
Status: COMPLETED | OUTPATIENT
Start: 2020-07-16 | End: 2020-07-16

## 2020-07-16 RX ORDER — PROCHLORPERAZINE EDISYLATE 5 MG/ML
10 INJECTION INTRAMUSCULAR; INTRAVENOUS ONCE
Status: COMPLETED | OUTPATIENT
Start: 2020-07-16 | End: 2020-07-16

## 2020-07-16 RX ORDER — KETOROLAC TROMETHAMINE 30 MG/ML
30 INJECTION, SOLUTION INTRAMUSCULAR; INTRAVENOUS ONCE
Status: COMPLETED | OUTPATIENT
Start: 2020-07-16 | End: 2020-07-16

## 2020-07-16 RX ORDER — 0.9 % SODIUM CHLORIDE 0.9 %
1000 INTRAVENOUS SOLUTION INTRAVENOUS ONCE
Status: COMPLETED | OUTPATIENT
Start: 2020-07-16 | End: 2020-07-16

## 2020-07-16 RX ORDER — ACETAMINOPHEN 500 MG
1000 TABLET ORAL ONCE
Status: COMPLETED | OUTPATIENT
Start: 2020-07-16 | End: 2020-07-16

## 2020-07-16 RX ORDER — DIAZEPAM 5 MG/1
5 TABLET ORAL ONCE
Status: COMPLETED | OUTPATIENT
Start: 2020-07-16 | End: 2020-07-16

## 2020-07-16 RX ADMIN — SODIUM CHLORIDE 1000 ML: 9 INJECTION, SOLUTION INTRAVENOUS at 19:43

## 2020-07-16 RX ADMIN — ACETAMINOPHEN 1000 MG: 500 TABLET ORAL at 19:44

## 2020-07-16 RX ADMIN — KETOROLAC TROMETHAMINE 30 MG: 30 INJECTION, SOLUTION INTRAMUSCULAR at 20:47

## 2020-07-16 RX ADMIN — DIAZEPAM 5 MG: 5 TABLET ORAL at 20:48

## 2020-07-16 RX ADMIN — DIPHENHYDRAMINE HYDROCHLORIDE 25 MG: 50 INJECTION, SOLUTION INTRAMUSCULAR; INTRAVENOUS at 19:44

## 2020-07-16 RX ADMIN — PROCHLORPERAZINE EDISYLATE 10 MG: 5 INJECTION INTRAMUSCULAR; INTRAVENOUS at 19:45

## 2020-07-16 ASSESSMENT — PAIN DESCRIPTION - DESCRIPTORS
DESCRIPTORS: HEADACHE

## 2020-07-16 ASSESSMENT — PAIN DESCRIPTION - PAIN TYPE
TYPE: ACUTE PAIN

## 2020-07-16 ASSESSMENT — PAIN SCALES - GENERAL
PAINLEVEL_OUTOF10: 10
PAINLEVEL_OUTOF10: 6
PAINLEVEL_OUTOF10: 10

## 2020-07-16 ASSESSMENT — ENCOUNTER SYMPTOMS
BACK PAIN: 0
SHORTNESS OF BREATH: 0
NAUSEA: 1
ABDOMINAL PAIN: 0
COUGH: 0

## 2020-07-16 ASSESSMENT — PAIN DESCRIPTION - LOCATION
LOCATION: HEAD

## 2020-07-16 NOTE — ED NOTES
Headache right temporal area and eye, similar to usual migraine but more severe. Nausea no vomiting today.      Keisha Avilez RN  07/16/20 1945

## 2020-07-16 NOTE — ED PROVIDER NOTES
This is a 58-year-old female with a past medical history of migraines and neck pain who presents to the ED for evaluation of a headache. Patient states for the past 4 days she has been having a headache to the right side of her temple. She states this pain is been constant in nature and is severe. She states that her pain is a throbbing sensation is a 10 out of 10 on 10 pain scale. States she does have some associated photophobia and nausea but denies any vomiting or diarrhea. Has any fevers neck stiffness or urinary incontinence or chills. Patient states she does have some phonophobia as well. States that this is worse than her typical headaches. Has any traumas or falls. The history is provided by the patient. No  was used. Review of Systems   Constitutional: Negative for fever. HENT: Negative for congestion. Eyes: Negative for visual disturbance. Respiratory: Negative for cough and shortness of breath. Cardiovascular: Negative for chest pain. Gastrointestinal: Positive for nausea. Negative for abdominal pain. Endocrine: Negative for polyuria. Genitourinary: Negative for dysuria. Musculoskeletal: Negative for back pain. Skin: Negative for rash. Neurological: Negative for headaches. Hematological: Does not bruise/bleed easily. Psychiatric/Behavioral: Negative for confusion. Physical Exam  Vitals signs and nursing note reviewed. Constitutional:       General: She is not in acute distress. Appearance: She is well-developed. HENT:      Head: Normocephalic and atraumatic. Mouth/Throat:      Mouth: Mucous membranes are moist.   Eyes:      Extraocular Movements: Extraocular movements intact. Pupils: Pupils are equal, round, and reactive to light. Neck:      Musculoskeletal: Normal range of motion and neck supple. Vascular: No JVD. Cardiovascular:      Rate and Rhythm: Normal rate and regular rhythm. Heart sounds:  No murmur. Pulmonary:      Effort: Pulmonary effort is normal.      Breath sounds: No wheezing, rhonchi or rales. Chest:      Chest wall: No tenderness. Abdominal:      General: There is no distension. Palpations: Abdomen is soft. Tenderness: There is no abdominal tenderness. There is no guarding or rebound. Hernia: No hernia is present. Musculoskeletal:      Right lower leg: No edema. Left lower leg: No edema. Skin:     General: Skin is warm and dry. Capillary Refill: Capillary refill takes less than 2 seconds. Neurological:      General: No focal deficit present. Mental Status: She is alert and oriented to person, place, and time. Mental status is at baseline. Cranial Nerves: No cranial nerve deficit. Psychiatric:         Mood and Affect: Mood normal.         Behavior: Behavior normal.         Procedures    MDM  Number of Diagnoses or Management Options  Other headache syndrome:   Diagnosis management comments: This is a 67-year-old female with a past medical history of chronic neck pain and headaches who presents to the ED for a headache that been ongoing for the past several days. Patient states this is the worst attack she has had in quite some time and she had associated nausea and photophobia. Patient had an unremarkable neurological examination however given the severity of her pain I did perform a CT scan which showed no intracranial bleed. Patient had no other red flag symptoms just fever neck stiffness concern for meningitis. She was treated symptomatically with significant provement on repeat examination. Patient was appropriate for discharge given her improvement unremarkable examination she was given Flexeril for home.   Patient was advised to return to the ED should he develop any worsening pain fevers chills or any other concerns whatsoever.             --------------------------------------------- PAST HISTORY Statement Selected

## 2020-07-21 ENCOUNTER — TELEPHONE (OUTPATIENT)
Dept: SURGERY | Age: 50
End: 2020-07-21

## 2020-07-21 NOTE — TELEPHONE ENCOUNTER
Called and left message for patient to schedule surgical consult in [place]. Will try again later.     Electronically signed by Melanie Michelle MA on 7/21/2020 at 3:53 PM

## 2020-07-23 ENCOUNTER — OFFICE VISIT (OUTPATIENT)
Dept: SURGERY | Age: 50
End: 2020-07-23
Payer: OTHER GOVERNMENT

## 2020-07-23 VITALS
DIASTOLIC BLOOD PRESSURE: 86 MMHG | TEMPERATURE: 97.9 F | HEART RATE: 116 BPM | BODY MASS INDEX: 33.63 KG/M2 | HEIGHT: 64 IN | OXYGEN SATURATION: 98 % | SYSTOLIC BLOOD PRESSURE: 112 MMHG | WEIGHT: 197 LBS

## 2020-07-23 PROCEDURE — 99204 OFFICE O/P NEW MOD 45 MIN: CPT | Performed by: SURGERY

## 2020-07-23 PROCEDURE — 3017F COLORECTAL CA SCREEN DOC REV: CPT | Performed by: SURGERY

## 2020-07-23 PROCEDURE — G8427 DOCREV CUR MEDS BY ELIG CLIN: HCPCS | Performed by: SURGERY

## 2020-07-23 PROCEDURE — G8417 CALC BMI ABV UP PARAM F/U: HCPCS | Performed by: SURGERY

## 2020-07-23 PROCEDURE — 1036F TOBACCO NON-USER: CPT | Performed by: SURGERY

## 2020-07-23 NOTE — PROGRESS NOTES
General Surgery History and Physical    Patient's Name/Date of Birth: La Nena Galeana / 1970    Date: July 23, 2020     Surgeon: Edwige Marcano M.D.    PCP: Otilio Steen MD     Chief Complaint: recurrent hiatal hernia    HPI:   La Nena Galeana is a 45 y.o. female who presents for evaluation of symptomatic hiatal hernia that was responsive to medical treatment but has become recalcitrant. Has  GERD, EGD showed large HH, Has not had manometry. Has had  imaging. Timing is constant, radiation to epigastrium, alleviated by nothing and started years ago and severity is 2-7/10       Past Medical History:   Diagnosis Date    Arthritis     Bleeding ulcer 2002    Depression     DJD (degenerative joint disease)     Fibromyalgia     GERD (gastroesophageal reflux disease)     Hiatal hernia     Osteoarthritis     Osteopenia     Seizure ([address] 03/2012           Past Surgical History:   Procedure Laterality Date    APPENDECTOMY      BACK SURGERY      dexter x2 in [address] [place] [address]      reduction 6lbs each    CHOLECYSTECTOMY      COLONOSCOPY      ECHO COMPL W DOP COLOR FLOW  3/21/2012         ENDOMETRIAL ABLATION      twice    ENDOSCOPY, COLON, DIAGNOSTIC      FRACTURE SURGERY      HYSTERECTOMY      KNEE ARTHROSCOPY Left     x 3    NECK SURGERY  12/2018    NERVE BLOCK      [id number] 2018    KS ARTHRODESIS ANT INTERBODY INC DISCECTOMY, CERVICAL BELOW C2 N/A 12/3/2018    CERVICAL  FUSION ANTERIOR C5-6, WITH PLATE, SCREWS, BONE GRAFT, MEDTRONIC ---BOP performed by Mel Couch MD at [facility] OR       Current Outpatient Medications   Medication Sig Dispense Refill    cyclobenzaprine (FLEXERIL) 5 MG tablet Take 1 tablet by mouth 2 times daily as needed for Muscle spasms 10 tablet 0    Metoprolol-HCTZ ER 25-12.5 MG TB24 Take 1 tablet by mouth daily       No current facility-administered medications for this visit.         No Known Allergies    The patient has a family history that is negative for severe cardiovascular or respiratory issues, negative for reaction to anesthesia. Social History     Socioeconomic History    Marital status:       Spouse name: Not on file    Number of children: Not on file    Years of education: Not on file    Highest education level: Not on file   Occupational History    Not on file   Social Needs    Financial resource strain: Not on file    Food insecurity     Worry: Not on file     Inability: Not on file    Transportation needs     Medical: Not on file     Non-medical: Not on file   Tobacco Use    Smoking status: Former Smoker     Years: 20.00     Last attempt to quit: 2012     Years since quittin.6    Smokeless tobacco: Never Used   Substance and Sexual Activity    Alcohol use: No     Comment: rare    Drug use: No    Sexual activity: Yes     Partners: Male     Birth control/protection: Surgical   Lifestyle    Physical activity     Days per week: Not on file     Minutes per session: Not on file    Stress: Not on file   Relationships    Social connections     Talks on phone: Not on file     Gets together: Not on file     Attends Yarsanism service: Not on file     Active member of club or organization: Not on file     Attends meetings of clubs or organizations: Not on file     Relationship status: Not on file    Intimate partner violence     Fear of current or ex partner: Not on file     Emotionally abused: Not on file     Physically abused: Not on file     Forced sexual activity: Not on file   Other Topics Concern    Not on file   Social History Narrative    Not on file           Review of Systems  Review of Systems -  General ROS: negative for - chills, fatigue or malaise  ENT ROS: negative for - hearing change, nasal congestion or nasal discharge  Allergy and Immunology ROS: negative for - hives, itchy/watery eyes or nasal congestion  Hematological and Lymphatic ROS: negative for - blood clots, blood transfusions, bruising or fatigue  Endocrine ROS: negative for - malaise/lethargy, mood swings, palpitations or polydipsia/polyuria  Respiratory ROS: negative for - sputum changes, stridor, tachypnea or wheezing  Cardiovascular ROS: negative for - irregular heartbeat, loss of consciousness, murmur or orthopnea  Gastrointestinal ROS: negative for - constipation, diarrhea, gas/bloating, or hematemesis, positive for heartburn and other epigastric pain  Genito-Urinary ROS: negative for -  genital discharge, genital ulcers or hematuria  Musculoskeletal ROS: negative for - gait disturbance, muscle pain or muscular weakness    Physical exam:   /86   Pulse 116   Temp 97.9 °F (36.6 °C) (Temporal)   Ht 5' 4\" (1.626 m)   Wt 197 lb (89.4 kg)   LMP 08/21/2011   SpO2 98%   BMI 33.81 kg/m²   General appearance:  NAD  Pyscho/social: negative for tremors and hallucinations  Head: NCAT, PERRLA, EOMI, red conjunctiva  Neck: supple, no masses  Lungs: CTAB, equal chest rise bilateral  Heart: Reg rate  Abdomen: soft, nontender, nondistended  Skin; no lesions  Gu: no cva tenderness  Extremities: extremities normal, atraumatic, no cyanosis or edema      Assessment:  45 y.o. female with recurrent symptomatic hiatal hernia    Plan:   TO OR   Discussed the risk, benefits and alternatives of surgery including wound infections, bleeding, scar and hernia formation and the risks of general anesthetic including MI, CVA, sudden death or reactions to anesthetic medications. The patient understands the risks and alternatives and the possibility of converting to an open procedure. All questions were answered to the patient's satisfaction and they freely signed the consent.       Amrita Novoa MD  2:39 PM  7/23/2020

## 2020-07-24 ENCOUNTER — HOSPITAL ENCOUNTER (OUTPATIENT)
Age: 50
Discharge: HOME | End: 2020-07-26
Payer: OTHER GOVERNMENT

## 2020-07-24 ENCOUNTER — PREP FOR PROCEDURE (OUTPATIENT)
Dept: SURGERY | Age: 50
End: 2020-07-24

## 2020-07-24 PROCEDURE — U0003 INFECTIOUS AGENT DETECTION BY NUCLEIC ACID (DNA OR RNA); SEVERE ACUTE RESPIRATORY SYNDROME CORONAVIRUS 2 (SARS-COV-2) (CORONAVIRUS DISEASE [COVID-19]), AMPLIFIED PROBE TECHNIQUE, MAKING USE OF HIGH THROUGHPUT TECHNOLOGIES AS DESCRIBED BY CMS-2020-01-R: HCPCS

## 2020-07-24 RX ORDER — SODIUM CHLORIDE, SODIUM LACTATE, POTASSIUM CHLORIDE, CALCIUM CHLORIDE 600; 310; 30; 20 MG/100ML; MG/100ML; MG/100ML; MG/100ML
INJECTION, SOLUTION INTRAVENOUS CONTINUOUS
Status: CANCELLED | OUTPATIENT
Start: 2020-07-24

## 2020-07-24 RX ORDER — SODIUM CHLORIDE 0.9 % (FLUSH) 0.9 %
10 SYRINGE (ML) INJECTION PRN
Status: CANCELLED | OUTPATIENT
Start: 2020-07-24

## 2020-07-24 RX ORDER — SODIUM CHLORIDE 0.9 % (FLUSH) 0.9 %
10 SYRINGE (ML) INJECTION EVERY 12 HOURS SCHEDULED
Status: CANCELLED | OUTPATIENT
Start: 2020-07-24

## 2020-07-26 LAB
SARS-COV-2: NOT DETECTED
SOURCE: NORMAL

## 2020-07-27 NOTE — PROGRESS NOTES
[facility]                                                                                                                    PRE OP INSTRUCTIONS FOR  Chintan Wright        Date: 7/27/2020    Leida Lay of surgery: 7/28/20  Arrival Time: Hospital will call you between 5pm and 7pm with your final arrival time for surgery    1. Do not eat or drink anything after midnight prior to surgery. This includes no water, chewing gum, mints or ice chips. 2. Take the following medications with a small sip of water on the morning of Surgery: omeprazole     3. Diabetics may take evening dose of insulin but none after midnight. If you feel symptomatic or low blood sugar morning of surgery drink 1-2 ounces of apple juice only. 4. Aspirin, Ibuprofen, Advil, Naproxen, Vitamin E and other Anti-inflammatory products should be stopped  before surgery  as directed by your physician. Take Tylenol only unless instructed otherwise by your surgeon. 5. Check with your Doctor regarding stopping Plavix, Coumadin, Lovenox, Eliquis, Effient, or other blood thinners. 6. Do not smoke,use illicit drugs and do not drink any alcoholic beverages 24 hours prior to surgery. 7. You may brush your teeth the morning of surgery. DO NOT SWALLOW WATER    8. You MUST make arrangements for a responsible adult to take you home after your surgery. You will not be allowed to leave alone or drive yourself home. It is strongly suggested someone stay with you the first 24 hrs. Your surgery will be cancelled if you do not have a ride home. 9. PEDIATRIC PATIENTS ONLY:  A parent/legal guardian must accompany a child scheduled for surgery and plan to stay at the hospital until the child is discharged. Please do not bring other children with you.     10. Please wear simple, loose fitting clothing to the hospital.  Theodoro Koby not bring valuables (money, credit cards, checkbooks, etc.) Do not wear any makeup (including no eye makeup) or nail polish on your fingers or toes. 11. DO NOT wear any jewelry or piercings on day of surgery. All body piercing jewelry must be removed. 12. Shower the night before surgery with ___Antibacterial soap /CAROL WIPES________    13. TOTAL JOINT REPLACEMENT/HYSTERECTOMY PATIENTS ONLY---Remember to bring Blood Bank bracelet to the hospital on the day of surgery. 14. If you have a Living Will and Durable Power of  for Healthcare, please bring in a copy. 15. If appropriate bring crutches, inspirex, WALKER, CANE etc... 12. Notify your Surgeon if you develop any illness between now and surgery time, cough, cold, fever, sore throat, nausea, vomiting, etc.  Please notify your surgeon if you experience dizziness, shortness of breath or blurred vision between now & the time of your surgery. 17. If you have ___dentures, they will be removed before going to the OR; we will provide you a container. If you wear ___contact lenses or ___glasses, they will be removed; please bring a case for them. 18. To provide excellent care visitors will be limited to 2 in the room at any given time. 19. Please bring picture ID and insurance card. 20. Sleep apnea patients need to bring CPAP AND SETTINGS to hospital on day of surgery. 21. During flu season no children under the age of 15 are permitted in the hospital for the safety of all patients. 22. Other check in at the main lobby. Wear a mask. Please call AMBULATORY CARE if you have any further questions.    [address] [facility]     [address]

## 2020-07-28 ENCOUNTER — TELEPHONE (OUTPATIENT)
Dept: SURGERY | Age: 50
End: 2020-07-28

## 2020-07-28 ENCOUNTER — ANESTHESIA (OUTPATIENT)
Dept: OPERATING ROOM | Age: 50
End: 2020-07-28
Payer: OTHER GOVERNMENT

## 2020-07-28 ENCOUNTER — HOSPITAL ENCOUNTER (OUTPATIENT)
Age: 50
Setting detail: OUTPATIENT SURGERY
Discharge: HOME | End: 2020-07-28
Attending: SURGERY | Admitting: SURGERY
Payer: OTHER GOVERNMENT

## 2020-07-28 ENCOUNTER — ANESTHESIA EVENT (OUTPATIENT)
Dept: OPERATING ROOM | Age: 50
End: 2020-07-28
Payer: OTHER GOVERNMENT

## 2020-07-28 VITALS
RESPIRATION RATE: 16 BRPM | OXYGEN SATURATION: 96 % | TEMPERATURE: 96.6 F | WEIGHT: 201.5 LBS | HEIGHT: 64 IN | BODY MASS INDEX: 34.4 KG/M2 | SYSTOLIC BLOOD PRESSURE: 138 MMHG | HEART RATE: 58 BPM | DIASTOLIC BLOOD PRESSURE: 78 MMHG

## 2020-07-28 VITALS
RESPIRATION RATE: 5 BRPM | SYSTOLIC BLOOD PRESSURE: 136 MMHG | TEMPERATURE: 93.6 F | DIASTOLIC BLOOD PRESSURE: 97 MMHG | OXYGEN SATURATION: 100 %

## 2020-07-28 PROCEDURE — 7100000011 HC PHASE II RECOVERY - ADDTL 15 MIN: Performed by: SURGERY

## 2020-07-28 PROCEDURE — 6370000000 HC RX 637 (ALT 250 FOR IP): Performed by: SURGERY

## 2020-07-28 PROCEDURE — 3600000004 HC SURGERY LEVEL 4 BASE: Performed by: SURGERY

## 2020-07-28 PROCEDURE — 2500000003 HC RX 250 WO HCPCS: Performed by: SURGERY

## 2020-07-28 PROCEDURE — 2709999900 HC NON-CHARGEABLE SUPPLY: Performed by: SURGERY

## 2020-07-28 PROCEDURE — 6360000002 HC RX W HCPCS: Performed by: SURGERY

## 2020-07-28 PROCEDURE — 3700000000 HC ANESTHESIA ATTENDED CARE: Performed by: SURGERY

## 2020-07-28 PROCEDURE — 7100000000 HC PACU RECOVERY - FIRST 15 MIN: Performed by: SURGERY

## 2020-07-28 PROCEDURE — 43282 LAP PARAESOPH HER RPR W/MESH: CPT | Performed by: SURGERY

## 2020-07-28 PROCEDURE — 15734 MUSCLE-SKIN GRAFT TRUNK: CPT | Performed by: SURGERY

## 2020-07-28 PROCEDURE — 2580000003 HC RX 258: Performed by: SURGERY

## 2020-07-28 PROCEDURE — 6360000002 HC RX W HCPCS: Performed by: ANESTHESIOLOGY

## 2020-07-28 PROCEDURE — 6370000000 HC RX 637 (ALT 250 FOR IP): Performed by: ANESTHESIOLOGY

## 2020-07-28 PROCEDURE — 3600000014 HC SURGERY LEVEL 4 ADDTL 15MIN: Performed by: SURGERY

## 2020-07-28 PROCEDURE — 3700000001 HC ADD 15 MINUTES (ANESTHESIA): Performed by: SURGERY

## 2020-07-28 PROCEDURE — 6360000002 HC RX W HCPCS

## 2020-07-28 PROCEDURE — 7100000001 HC PACU RECOVERY - ADDTL 15 MIN: Performed by: SURGERY

## 2020-07-28 PROCEDURE — 7100000010 HC PHASE II RECOVERY - FIRST 15 MIN: Performed by: SURGERY

## 2020-07-28 PROCEDURE — 2720000010 HC SURG SUPPLY STERILE: Performed by: SURGERY

## 2020-07-28 PROCEDURE — 2500000003 HC RX 250 WO HCPCS

## 2020-07-28 PROCEDURE — C1781 MESH (IMPLANTABLE): HCPCS | Performed by: SURGERY

## 2020-07-28 DEVICE — MESH SURG W8XL8CM FLAT SHT BIO-A: Type: IMPLANTABLE DEVICE | Site: ESOPHAGUS | Status: FUNCTIONAL

## 2020-07-28 RX ORDER — HYDROCODONE BITARTRATE AND ACETAMINOPHEN 5; 325 MG/1; MG/1
1 TABLET ORAL PRN
Status: COMPLETED | OUTPATIENT
Start: 2020-07-28 | End: 2020-07-28

## 2020-07-28 RX ORDER — DEXAMETHASONE SODIUM PHOSPHATE 10 MG/ML
INJECTION, SOLUTION INTRAMUSCULAR; INTRAVENOUS PRN
Status: DISCONTINUED | OUTPATIENT
Start: 2020-07-28 | End: 2020-07-28 | Stop reason: SDUPTHER

## 2020-07-28 RX ORDER — IBUPROFEN 800 MG/1
800 TABLET ORAL EVERY 6 HOURS PRN
Qty: 20 TABLET | Refills: 0 | Status: SHIPPED | OUTPATIENT
Start: 2020-07-28 | End: 2020-08-11 | Stop reason: SDUPTHER

## 2020-07-28 RX ORDER — HYDROCODONE BITARTRATE AND ACETAMINOPHEN 5; 325 MG/1; MG/1
2 TABLET ORAL PRN
Status: COMPLETED | OUTPATIENT
Start: 2020-07-28 | End: 2020-07-28

## 2020-07-28 RX ORDER — MIDAZOLAM HYDROCHLORIDE 1 MG/ML
INJECTION INTRAMUSCULAR; INTRAVENOUS PRN
Status: DISCONTINUED | OUTPATIENT
Start: 2020-07-28 | End: 2020-07-28 | Stop reason: SDUPTHER

## 2020-07-28 RX ORDER — FENTANYL CITRATE 50 UG/ML
25 INJECTION, SOLUTION INTRAMUSCULAR; INTRAVENOUS EVERY 5 MIN PRN
Status: DISCONTINUED | OUTPATIENT
Start: 2020-07-28 | End: 2020-07-28 | Stop reason: HOSPADM

## 2020-07-28 RX ORDER — MORPHINE SULFATE 2 MG/ML
INJECTION, SOLUTION INTRAMUSCULAR; INTRAVENOUS
Status: COMPLETED
Start: 2020-07-28 | End: 2020-07-28

## 2020-07-28 RX ORDER — FENTANYL CITRATE 50 UG/ML
INJECTION, SOLUTION INTRAMUSCULAR; INTRAVENOUS PRN
Status: DISCONTINUED | OUTPATIENT
Start: 2020-07-28 | End: 2020-07-28 | Stop reason: SDUPTHER

## 2020-07-28 RX ORDER — SODIUM CHLORIDE 0.9 % (FLUSH) 0.9 %
10 SYRINGE (ML) INJECTION EVERY 12 HOURS SCHEDULED
Status: DISCONTINUED | OUTPATIENT
Start: 2020-07-28 | End: 2020-07-28 | Stop reason: HOSPADM

## 2020-07-28 RX ORDER — MORPHINE SULFATE 2 MG/ML
2 INJECTION, SOLUTION INTRAMUSCULAR; INTRAVENOUS EVERY 5 MIN PRN
Status: COMPLETED | OUTPATIENT
Start: 2020-07-28 | End: 2020-07-28

## 2020-07-28 RX ORDER — ONDANSETRON 2 MG/ML
INJECTION INTRAMUSCULAR; INTRAVENOUS PRN
Status: DISCONTINUED | OUTPATIENT
Start: 2020-07-28 | End: 2020-07-28 | Stop reason: SDUPTHER

## 2020-07-28 RX ORDER — SODIUM CHLORIDE, SODIUM LACTATE, POTASSIUM CHLORIDE, CALCIUM CHLORIDE 600; 310; 30; 20 MG/100ML; MG/100ML; MG/100ML; MG/100ML
INJECTION, SOLUTION INTRAVENOUS CONTINUOUS
Status: DISCONTINUED | OUTPATIENT
Start: 2020-07-28 | End: 2020-07-28 | Stop reason: HOSPADM

## 2020-07-28 RX ORDER — PROMETHAZINE HYDROCHLORIDE 25 MG/ML
6.25 INJECTION, SOLUTION INTRAMUSCULAR; INTRAVENOUS
Status: DISCONTINUED | OUTPATIENT
Start: 2020-07-28 | End: 2020-07-28 | Stop reason: HOSPADM

## 2020-07-28 RX ORDER — SODIUM CHLORIDE 0.9 % (FLUSH) 0.9 %
10 SYRINGE (ML) INJECTION PRN
Status: DISCONTINUED | OUTPATIENT
Start: 2020-07-28 | End: 2020-07-28 | Stop reason: HOSPADM

## 2020-07-28 RX ORDER — BUPIVACAINE HYDROCHLORIDE AND EPINEPHRINE 2.5; 5 MG/ML; UG/ML
INJECTION, SOLUTION EPIDURAL; INFILTRATION; INTRACAUDAL; PERINEURAL PRN
Status: DISCONTINUED | OUTPATIENT
Start: 2020-07-28 | End: 2020-07-28 | Stop reason: ALTCHOICE

## 2020-07-28 RX ORDER — ROCURONIUM BROMIDE 10 MG/ML
INJECTION, SOLUTION INTRAVENOUS PRN
Status: DISCONTINUED | OUTPATIENT
Start: 2020-07-28 | End: 2020-07-28 | Stop reason: SDUPTHER

## 2020-07-28 RX ORDER — CEFAZOLIN SODIUM 2 G/50ML
2 SOLUTION INTRAVENOUS
Status: COMPLETED | OUTPATIENT
Start: 2020-07-28 | End: 2020-07-28

## 2020-07-28 RX ORDER — GLYCOPYRROLATE 1 MG/5 ML
SYRINGE (ML) INTRAVENOUS PRN
Status: DISCONTINUED | OUTPATIENT
Start: 2020-07-28 | End: 2020-07-28 | Stop reason: SDUPTHER

## 2020-07-28 RX ORDER — HYDROCODONE BITARTRATE AND ACETAMINOPHEN 5; 325 MG/1; MG/1
1 TABLET ORAL EVERY 4 HOURS PRN
Qty: 18 TABLET | Refills: 0 | Status: SHIPPED | OUTPATIENT
Start: 2020-07-28 | End: 2020-07-31

## 2020-07-28 RX ORDER — LIDOCAINE HYDROCHLORIDE 20 MG/ML
INJECTION, SOLUTION INTRAVENOUS PRN
Status: DISCONTINUED | OUTPATIENT
Start: 2020-07-28 | End: 2020-07-28 | Stop reason: SDUPTHER

## 2020-07-28 RX ORDER — MEPERIDINE HYDROCHLORIDE 25 MG/ML
12.5 INJECTION INTRAMUSCULAR; INTRAVENOUS; SUBCUTANEOUS EVERY 5 MIN PRN
Status: DISCONTINUED | OUTPATIENT
Start: 2020-07-28 | End: 2020-07-28 | Stop reason: HOSPADM

## 2020-07-28 RX ORDER — PROPOFOL 10 MG/ML
INJECTION, EMULSION INTRAVENOUS PRN
Status: DISCONTINUED | OUTPATIENT
Start: 2020-07-28 | End: 2020-07-28 | Stop reason: SDUPTHER

## 2020-07-28 RX ORDER — NEOSTIGMINE METHYLSULFATE 1 MG/ML
INJECTION, SOLUTION INTRAVENOUS PRN
Status: DISCONTINUED | OUTPATIENT
Start: 2020-07-28 | End: 2020-07-28 | Stop reason: SDUPTHER

## 2020-07-28 RX ADMIN — MORPHINE SULFATE 2 MG: 2 INJECTION, SOLUTION INTRAMUSCULAR; INTRAVENOUS at 09:24

## 2020-07-28 RX ADMIN — CEFAZOLIN SODIUM 2 G: 2 SOLUTION INTRAVENOUS at 07:42

## 2020-07-28 RX ADMIN — Medication 0.6 MG: at 08:58

## 2020-07-28 RX ADMIN — MORPHINE SULFATE 2 MG: 2 INJECTION, SOLUTION INTRAMUSCULAR; INTRAVENOUS at 09:45

## 2020-07-28 RX ADMIN — Medication 3 MG: at 08:58

## 2020-07-28 RX ADMIN — ROCURONIUM BROMIDE 40 MG: 10 SOLUTION INTRAVENOUS at 07:46

## 2020-07-28 RX ADMIN — MORPHINE SULFATE 2 MG: 2 INJECTION, SOLUTION INTRAMUSCULAR; INTRAVENOUS at 09:36

## 2020-07-28 RX ADMIN — FENTANYL CITRATE 50 MCG: 50 INJECTION, SOLUTION INTRAMUSCULAR; INTRAVENOUS at 09:06

## 2020-07-28 RX ADMIN — MIDAZOLAM 2 MG: 1 INJECTION INTRAMUSCULAR; INTRAVENOUS at 07:42

## 2020-07-28 RX ADMIN — ONDANSETRON 4 MG: 2 INJECTION INTRAMUSCULAR; INTRAVENOUS at 08:58

## 2020-07-28 RX ADMIN — PROPOFOL 200 MG: 10 INJECTION, EMULSION INTRAVENOUS at 07:46

## 2020-07-28 RX ADMIN — FENTANYL CITRATE 50 MCG: 50 INJECTION, SOLUTION INTRAMUSCULAR; INTRAVENOUS at 08:03

## 2020-07-28 RX ADMIN — MORPHINE SULFATE 2 MG: 2 INJECTION, SOLUTION INTRAMUSCULAR; INTRAVENOUS at 09:29

## 2020-07-28 RX ADMIN — LIDOCAINE HYDROCHLORIDE 40 MG: 20 INJECTION, SOLUTION INTRAVENOUS at 07:46

## 2020-07-28 RX ADMIN — FENTANYL CITRATE 50 MCG: 50 INJECTION, SOLUTION INTRAMUSCULAR; INTRAVENOUS at 09:01

## 2020-07-28 RX ADMIN — HYDROCODONE BITARTRATE AND ACETAMINOPHEN 1 TABLET: 5; 325 TABLET ORAL at 11:37

## 2020-07-28 RX ADMIN — SODIUM CHLORIDE, POTASSIUM CHLORIDE, SODIUM LACTATE AND CALCIUM CHLORIDE: 600; 310; 30; 20 INJECTION, SOLUTION INTRAVENOUS at 06:46

## 2020-07-28 RX ADMIN — FENTANYL CITRATE 100 MCG: 50 INJECTION, SOLUTION INTRAMUSCULAR; INTRAVENOUS at 07:46

## 2020-07-28 RX ADMIN — DEXAMETHASONE SODIUM PHOSPHATE 10 MG: 10 INJECTION, SOLUTION INTRAMUSCULAR; INTRAVENOUS at 07:50

## 2020-07-28 RX ADMIN — HYDROMORPHONE HYDROCHLORIDE 0.5 MG: 1 INJECTION, SOLUTION INTRAMUSCULAR; INTRAVENOUS; SUBCUTANEOUS at 09:53

## 2020-07-28 RX ADMIN — Medication 0.5 MG: at 09:53

## 2020-07-28 ASSESSMENT — PULMONARY FUNCTION TESTS
PIF_VALUE: 29
PIF_VALUE: 0
PIF_VALUE: 1
PIF_VALUE: 19
PIF_VALUE: 23
PIF_VALUE: 18
PIF_VALUE: 22
PIF_VALUE: 21
PIF_VALUE: 29
PIF_VALUE: 22
PIF_VALUE: 30
PIF_VALUE: 29
PIF_VALUE: 30
PIF_VALUE: 20
PIF_VALUE: 26
PIF_VALUE: 25
PIF_VALUE: 30
PIF_VALUE: 29
PIF_VALUE: 31
PIF_VALUE: 27
PIF_VALUE: 22
PIF_VALUE: 26
PIF_VALUE: 1
PIF_VALUE: 19
PIF_VALUE: 22
PIF_VALUE: 27
PIF_VALUE: 23
PIF_VALUE: 19
PIF_VALUE: 26
PIF_VALUE: 6
PIF_VALUE: 22
PIF_VALUE: 28
PIF_VALUE: 26
PIF_VALUE: 22
PIF_VALUE: 21
PIF_VALUE: 25
PIF_VALUE: 4
PIF_VALUE: 22
PIF_VALUE: 28
PIF_VALUE: 23
PIF_VALUE: 29
PIF_VALUE: 26
PIF_VALUE: 14
PIF_VALUE: 29
PIF_VALUE: 28
PIF_VALUE: 28
PIF_VALUE: 18
PIF_VALUE: 29
PIF_VALUE: 28
PIF_VALUE: 0
PIF_VALUE: 0
PIF_VALUE: 29
PIF_VALUE: 0
PIF_VALUE: 28
PIF_VALUE: 25
PIF_VALUE: 19
PIF_VALUE: 22
PIF_VALUE: 29
PIF_VALUE: 26
PIF_VALUE: 28
PIF_VALUE: 24
PIF_VALUE: 28
PIF_VALUE: 28
PIF_VALUE: 29
PIF_VALUE: 22
PIF_VALUE: 28
PIF_VALUE: 28
PIF_VALUE: 29
PIF_VALUE: 23
PIF_VALUE: 28
PIF_VALUE: 23
PIF_VALUE: 23
PIF_VALUE: 30
PIF_VALUE: 30
PIF_VALUE: 22

## 2020-07-28 ASSESSMENT — PAIN DESCRIPTION - PAIN TYPE
TYPE: SURGICAL PAIN

## 2020-07-28 ASSESSMENT — PAIN DESCRIPTION - DESCRIPTORS
DESCRIPTORS: DISCOMFORT;SHARP;STABBING
DESCRIPTORS: PRESSURE
DESCRIPTORS: ACHING;DISCOMFORT;CONSTANT
DESCRIPTORS: DISCOMFORT;SHARP;STABBING

## 2020-07-28 ASSESSMENT — PAIN SCALES - GENERAL
PAINLEVEL_OUTOF10: 8
PAINLEVEL_OUTOF10: 10
PAINLEVEL_OUTOF10: 8
PAINLEVEL_OUTOF10: 8
PAINLEVEL_OUTOF10: 6
PAINLEVEL_OUTOF10: 0
PAINLEVEL_OUTOF10: 8
PAINLEVEL_OUTOF10: 5
PAINLEVEL_OUTOF10: 4
PAINLEVEL_OUTOF10: 8

## 2020-07-28 ASSESSMENT — PAIN DESCRIPTION - LOCATION
LOCATION: ABDOMEN

## 2020-07-28 ASSESSMENT — PAIN - FUNCTIONAL ASSESSMENT
PAIN_FUNCTIONAL_ASSESSMENT: 0-10
PAIN_FUNCTIONAL_ASSESSMENT: ACTIVITIES ARE NOT PREVENTED

## 2020-07-28 ASSESSMENT — PAIN DESCRIPTION - FREQUENCY
FREQUENCY: CONTINUOUS

## 2020-07-28 ASSESSMENT — PAIN DESCRIPTION - ORIENTATION
ORIENTATION: MID;UPPER

## 2020-07-28 ASSESSMENT — PAIN DESCRIPTION - ONSET: ONSET: ON-GOING

## 2020-07-28 ASSESSMENT — PAIN DESCRIPTION - PROGRESSION
CLINICAL_PROGRESSION: NOT CHANGED

## 2020-07-28 ASSESSMENT — LIFESTYLE VARIABLES: SMOKING_STATUS: 0

## 2020-07-28 NOTE — H&P
General Surgery History and Physical     Patient's Name/Date of Birth: Gisselle Bishop / 1970     Date: July 28, 2020      Surgeon: Francis Wang M.D.     PCP: Jace Villar MD     Chief Complaint: recurrent hiatal hernia     HPI:   Gisselle Bishop is a 45 y.o. female who presents for evaluation of symptomatic hiatal hernia that was responsive to medical treatment but has become recalcitrant. Has  GERD, EGD showed large HH, Has not had manometry. Has had  imaging.  Timing is constant, radiation to epigastrium, alleviated by nothing and started years ago and severity is 2-7/10         Past Medical History        Past Medical History:   Diagnosis Date    Arthritis      Bleeding ulcer 2002    Depression      DJD (degenerative joint disease)      Fibromyalgia      GERD (gastroesophageal reflux disease)      Hiatal hernia      Osteoarthritis      Osteopenia      Seizure ([address] 03/2012                Past Surgical History         Past Surgical History:   Procedure Laterality Date    APPENDECTOMY        [place]         [place]    BREAST SURGERY         reduction 6lbs each    CHOLECYSTECTOMY        COLONOSCOPY        ECHO COMPL W DOP COLOR FLOW   3/21/2012          ENDOMETRIAL ABLATION         twice    ENDOSCOPY, COLON, DIAGNOSTIC        FRACTURE SURGERY        HYSTERECTOMY        KNEE ARTHROSCOPY Left       x 3    NECK SURGERY   12/2018    NERVE BLOCK         [id number] 2018    [place] ARTHRODESIS ANT INTERBODY INC DISCECTOMY, CERVICAL BELOW C2 N/A 12/3/2018     CERVICAL  FUSION ANTERIOR C5-6, WITH PLATE, SCREWS, BONE GRAFT, MEDTRONIC ---BOP performed by Diego Jimenes MD at [address]           Current Facility-Administered Medications          Current Outpatient Medications   Medication Sig Dispense Refill    cyclobenzaprine (FLEXERIL) 5 MG tablet Take 1 tablet by mouth 2 times daily as needed for Muscle spasms 10 tablet 0    Metoprolol-HCTZ ER 25-12.5 MG TB24 Take 1 tablet by mouth daily          No current facility-administered medications for this visit.            No Known Allergies     The patient has a family history that is negative for severe cardiovascular or respiratory issues, negative for reaction to anesthesia.     Social History               Socioeconomic History    Marital status:        Spouse name: Not on file    Number of children: Not on file    Years of education: Not on file    Highest education level: Not on file   Occupational History    Not on file   Social Needs    Financial resource strain: Not on file    Food insecurity       Worry: Not on file       Inability: Not on file    Transportation needs       Medical: Not on file       Non-medical: Not on file   Tobacco Use    Smoking status: Former Smoker       Years: 20.00       Last attempt to quit: 2012       Years since quittin.6    Smokeless tobacco: Never Used   Substance and Sexual Activity    Alcohol use:  No       Comment: rare    Drug use: No    Sexual activity: Yes       Partners: Male       Birth control/protection: Surgical   Lifestyle    Physical activity       Days per week: Not on file       Minutes per session: Not on file    Stress: Not on file   Relationships    Social connections       Talks on phone: Not on file       Gets together: Not on file       Attends Congregation service: Not on file       Active member of club or organization: Not on file       Attends meetings of clubs or organizations: Not on file       Relationship status: Not on file    Intimate partner violence       Fear of current or ex partner: Not on file       Emotionally abused: Not on file       Physically abused: Not on file       Forced sexual activity: Not on file   Other Topics Concern    Not on file   Social History Narrative    Not on file                 Review of Systems  Review of Systems -  General ROS: negative for - chills, fatigue or malaise  ENT ROS: negative for - hearing change, nasal congestion or nasal discharge  Allergy and Immunology ROS: negative for - hives, itchy/watery eyes or nasal congestion  Hematological and Lymphatic ROS: negative for - blood clots, blood transfusions, bruising or fatigue  Endocrine ROS: negative for - malaise/lethargy, mood swings, palpitations or polydipsia/polyuria  Respiratory ROS: negative for - sputum changes, stridor, tachypnea or wheezing  Cardiovascular ROS: negative for - irregular heartbeat, loss of consciousness, murmur or orthopnea  Gastrointestinal ROS: negative for - constipation, diarrhea, gas/bloating, or hematemesis, positive for heartburn and other epigastric pain  Genito-Urinary ROS: negative for -  genital discharge, genital ulcers or hematuria  Musculoskeletal ROS: negative for - gait disturbance, muscle pain or muscular weakness     Physical exam:   /67   Pulse 67   Temp 98.1 °F (36.7 °C) (Temporal)   Resp 16   Ht 5' 4\" (1.626 m)   Wt 201 lb 8 oz (91.4 kg)   LMP 08/21/2011   SpO2 98%   BMI 34.59 kg/m²     General appearance:  NAD  Pyscho/social: negative for tremors and hallucinations  Head: NCAT, PERRLA, EOMI, red conjunctiva  Neck: supple, no masses  Lungs: CTAB, equal chest rise bilateral  Heart: Reg rate  Abdomen: soft, nontender, nondistended  Skin; no lesions  Gu: no cva tenderness  Extremities: extremities normal, atraumatic, no cyanosis or edema        Assessment:  45 y.o. female with recurrent symptomatic hiatal hernia     Plan:   TO OR   Discussed the risk, benefits and alternatives of surgery including wound infections, bleeding, scar and hernia formation and the risks of general anesthetic including MI, CVA, sudden death or reactions to anesthetic medications. The patient understands the risks and alternatives and the possibility of converting to an open procedure.  All questions were answered to the patient's satisfaction and they freely signed the consent.        Raciel Ochoa MD

## 2020-07-28 NOTE — ANESTHESIA POSTPROCEDURE EVALUATION
Department of Anesthesiology  Postprocedure Note    Patient: Edd Husain  MRN: [id number]  YOB: 1970  Date of evaluation: 7/28/2020  Time:  11:48 AM     Procedure Summary     Date:  07/28/20 Room / Location:  [address] [place] [address] 03 / [facility]    Anesthesia Start:  7116 Anesthesia Stop:  1662    Procedure:  LAPAROSCOPIC HIATAL HERNIA REPAIR WITH MESH (N/A Abdomen) Diagnosis:  (HIATAL HERNIA)    Surgeon:  Farrukh Fuller MD Responsible Provider:  Alberto Munoz MD    Anesthesia Type:  general ASA Status:  3          Anesthesia Type: general    Bianka Phase I: Bianka Score: 10    Biakna Phase II: Bianka Score: 10    Last vitals: Reviewed and per EMR flowsheets.        Anesthesia Post Evaluation    Patient location during evaluation: PACU  Patient participation: complete - patient participated  Level of consciousness: awake  Airway patency: patent  Nausea & Vomiting: no nausea and no vomiting  Complications: no  Cardiovascular status: hemodynamically stable  Respiratory status: acceptable  Hydration status: euvolemic

## 2020-07-28 NOTE — ANESTHESIA PRE PROCEDURE
Department of Anesthesiology  Preprocedure Note       Name:  Shelley Gaucher   Age:  45 y.o.  :  1970                                          MRN:  [id number]         Date:  2020      Surgeon: Alin Hunt):  MD Maddi Mejia MD    Procedure: Procedure(s):  LAPAROSCOPIC HIATAL HERNIA REPAIR WITH MESH    Medications prior to admission:   Prior to Admission medications    Medication Sig Start Date End Date Taking?  Authorizing Provider   OMEPRAZOLE PO Take by mouth daily   Yes Historical Provider, MD       Current medications:    Current Facility-Administered Medications   Medication Dose Route Frequency Provider Last Rate Last Dose    ceFAZolin (ANCEF) 2 g in dextrose 3 % 50 mL IVPB (duplex)  2 g Intravenous On Call to [address] [place] [address], MD        lactated ringers infusion   Intravenous Continuous Yoni Jaramillo MD 75 mL/hr at 20 0646      sodium chloride flush 0.9 % injection 10 mL  10 mL Intravenous 2 times per day Yoni Jaramillo MD        sodium chloride flush 0.9 % injection 10 mL  10 mL Intravenous PRN Yoni Jaramillo MD           Allergies:  No Known Allergies    Problem List:    Patient Active Problem List   Diagnosis Code    Fibromyalgia M79.7    DJD (degenerative joint disease) M19.90    Arthritis M19.90    Osteopenia M85.80    Bleeding ulcer K27.4    Syncope and collapse R55    Neck pain M54.2    Bulging of cervical intervertebral disc M50.20    Cervical disc herniation M50.20       Past Medical History:        Diagnosis Date    Arthritis     Bleeding ulcer     Depression     DJD (degenerative joint disease)     Fibromyalgia     GERD (gastroesophageal reflux disease)     Hiatal hernia     Osteoarthritis     Osteopenia     Seizure ([address] 2012           Past Surgical History:        Procedure Laterality Date    APPENDECTOMY      [place] in [place]    BREAST SURGERY      reduction 6lbs each    CHOLECYSTECTOMY  COLONOSCOPY      ECHO COMPL W DOP COLOR FLOW  3/21/2012         ENDOMETRIAL ABLATION      twice    ENDOSCOPY, COLON, DIAGNOSTIC      FRACTURE SURGERY      HYSTERECTOMY      KNEE ARTHROSCOPY Left     x 3    NECK SURGERY  2018    NERVE BLOCK      [id number]    [place] ARTHRODESIS ANT INTERBODY INC DISCECTOMY, CERVICAL BELOW C2 N/A 12/3/2018    CERVICAL  FUSION ANTERIOR C5-6, WITH PLATE, SCREWS, BONE GRAFT, MEDTRONIC ---BOP performed by Nely Alfred MD at [address] [place] [address] History:    Social History     Tobacco Use    Smoking status: Former Smoker     Years: 20.00     Last attempt to quit: 2012     Years since quittin.6    Smokeless tobacco: Never Used   Substance Use Topics    Alcohol use: No     Comment: rare                                Counseling given: Not Answered      Vital Signs (Current):   Vitals:    20 0628   BP: 105/67   Pulse: 67   Resp: 16   Temp: 98.1 °F (36.7 °C)   TempSrc: Temporal   SpO2: 98%   Weight: 201 lb 8 oz (91.4 kg)   Height: 5' 4\" (1.626 m)                                              BP Readings from Last 3 Encounters:   20 105/67   20 112/86   20 116/76       NPO Status: Time of last liquid consumption: 0500(sip of water with meds)                        Time of last solid consumption:                         Date of last liquid consumption: 20                        Date of last solid food consumption: 20    BMI:   Wt Readings from Last 3 Encounters:   20 201 lb 8 oz (91.4 kg)   20 197 lb (89.4 kg)   20 197 lb 12.8 oz (89.7 kg)     Body mass index is 34.59 kg/m².     CBC:   Lab Results   Component Value Date    WBC 7.3 10/13/2019    RBC 4.33 10/13/2019    HGB 13.5 10/13/2019    HCT 39.5 10/13/2019    MCV 91.2 10/13/2019    RDW 13.7 10/13/2019     10/13/2019       CMP:   Lab Results   Component Value Date     10/13/2019    K 4.0 10/13/2019    K 3.6 2019     10/13/2019 Anesthesia Plan      general     ASA 3     (#20G R hand)  Induction: intravenous. BIS  MIPS: Postoperative opioids intended, Prophylactic antiemetics administered and Postoperative trial extubation. Anesthetic plan and risks discussed with patient. Plan discussed with CRNA and attending. Attending anesthesiologist reviewed and agrees with Pre Eval content            Td Snow RN   7/28/2020  DOS STAFF ADDENDUM:    Pt seen and examined, chart reviewed (including anesthesia, drug and allergy history). Anesthetic plan, risks, benefits, alternatives, and personnel involved discussed with patient. Patient verbalized an understanding and agrees to proceed. Plan discussed with care team members and agreed upon.     Meir Parisi MD  Staff Anesthesiologist  7:28 AM

## 2020-07-28 NOTE — OP NOTE
DATE OF PROCEDURE: 7/28/2020    SURGEON: NEWTON Christopher, no qualified resident, he assisted with dissection, sewing and endoscopy    PREOPERATIVE DIAGNOSIS: Large paraesophageal midline hiatal hernia with severe reflux and muscle disruption. POSTOPERATIVE DIAGNOSIS: same    OPERATION:   1.)Laparoscopic midline paraesophageal hiatal hernia repair  with mesh  2.) collin cutaneous flap and      ANESTHESIA: General.    ESTIMATED BLOOD LOSS: 40 mL. COMPLICATIONS: None. FLUIDS: Crystalloid. DISPOSITION: Will be admitted to the floor for routine postoperative care. SPECIMEN: None. INDICATION: This is a patient who came in with the aforementioned  diagnosis. The patient had severe reflux. I explained the risks, benefits,  potential outcomes, alternative treatment to aforementioned procedure, and  she agreed to proceed, understanding those risks and potential outcomes. DESCRIPTION OF PROCEDURE: The patient was brought to the operative suite,  was placed under general anesthesia, was given preoperative antibiotics  within 30 minutes of incision, then had bilateral PCDs placed. Once this was done a 5 mm incision was made in the supraumbilical area. After local anesthetic was infiltrated into the abdominal wall, a Veress  needle was used to pass into the peritoneum. CO2 was then used to insufflate  the abdomen to a pressure of 15 mmHg. At this time, the Veress needle was  removed, and an 5 mm trocar was placed through this incision. Four  additional trocars were placed, 2 in the right lateral abdomen, one a 5 mm  trocar that was scythed through the rectus sheath at 2 different levels. An  additional 5 mm trocar was placed in the right lateral abdomen. An 5 mm  trocar was also placed in the left upper quadrant of the abdomen. At this time,  a Ángel retractor was put into this to retract the liver,  exposing the hiatal hernia. Hiatal hernia was identified.  The hernia sac  and its crural attachments were taken down using electrocautery and blunt  dissection. We were able to expose the entire esophagus and stomach that was  into this hiatal hernia and reduce it into the abdomen. We bluntly dissected  around the entire esophagus, mobilizing that down into the abdomen to have  approximately 3 cm of intraabdominal esophagus. At this time, we repaired the crura in a posterior fashion with a running,  nonabsorbable V-Loc suture. Once this was completed, a Bio A mesh was cut to keyhole around the esophagus and was placed posteriorly and sewn in place with v lock vicryl sutures. A falciform myofascial flap was then placed by taking the falciform down from abdominal wall and was placed posteriorly around to buttress our repair and protect the esophagus from the mesh. This was to support our repair as well as to  anchor the stomach and esophagus intra-abdominally. This was also done with  V fatoumata sutures. Once this was completed, an EGD scope was done to assure that  this was proper. The repair did not appear to be too tight, and the  endoscope passed easily into the stomach. It was retroflexed at that point  and the hiatus was intact without appearing to be too tight. The endoscope  was then removed. . Pneumoperitoneum was  evacuated. All lap and suture counts were correct, and the trocar sites were  closed with 4-0 Monocryl sutures in a subcuticular fashion, and then  Dermabond was placed. The patient tolerated the procedure well and was taken to PACU.

## 2020-08-04 ENCOUNTER — TELEPHONE (OUTPATIENT)
Dept: SURGERY | Age: 50
End: 2020-08-04

## 2020-08-04 NOTE — TELEPHONE ENCOUNTER
Patients friend called in to discuss patient having pain. I asked patients friend the pain level patient has been having he stated that her pain level is at an 8 for the past 2-3 days. She has not been vomiting and she states that she has not had anything stuck in her throat from eating. I advised patients friend that if the pain was at an 8 that she needed to go to the emergency room to be evaluated.     Electronically signed by Emily Perdomo on 8/4/2020 at 10:52 AM

## 2020-08-11 RX ORDER — IBUPROFEN 800 MG/1
800 TABLET ORAL EVERY 6 HOURS PRN
Qty: 20 TABLET | Refills: 0 | Status: SHIPPED
Start: 2020-08-11 | End: 2021-04-14

## 2020-08-15 ENCOUNTER — TELEPHONE (OUTPATIENT)
Dept: SURGERY | Age: 50
End: 2020-08-15

## 2020-08-15 RX ORDER — OXYCODONE HYDROCHLORIDE AND ACETAMINOPHEN 5; 325 MG/1; MG/1
1 TABLET ORAL EVERY 6 HOURS PRN
Qty: 8 TABLET | Refills: 0 | Status: SHIPPED | OUTPATIENT
Start: 2020-08-15 | End: 2020-08-18

## 2020-08-15 RX ORDER — ONDANSETRON 4 MG/1
4 TABLET, ORALLY DISINTEGRATING ORAL EVERY 8 HOURS PRN
Qty: 20 TABLET | Refills: 1 | Status: SHIPPED | OUTPATIENT
Start: 2020-08-15 | End: 2021-04-14

## 2020-08-15 RX ORDER — SUCRALFATE 1 G/1
1 TABLET ORAL 4 TIMES DAILY
Qty: 40 TABLET | Refills: 0 | Status: SHIPPED | OUTPATIENT
Start: 2020-08-15 | End: 2021-04-01

## 2020-08-15 NOTE — TELEPHONE ENCOUNTER
Called with pain left sided and small amount of hematemesis with retching. NO fever. Taking NSAIDs since surgery 3 weeks ago. Advised stop NSAIDs, provided Percocet and zofran. Advised go to ER if increased pain or bleeding. Suspect gastritis.  Advised liquids and full liquids only until followup    Ramiro Vazquez MD, MS  Minimally Invasive and Bariatric Surgery  [phone] (p)  8/15/2020  1:38 PM

## 2020-11-03 PROBLEM — M19.90 DJD (DEGENERATIVE JOINT DISEASE): Status: RESOLVED | Noted: 2020-11-03 | Resolved: 2020-11-03

## 2020-11-17 ENCOUNTER — APPOINTMENT (OUTPATIENT)
Dept: ULTRASOUND IMAGING | Age: 50
End: 2020-11-17
Payer: OTHER GOVERNMENT

## 2020-11-17 ENCOUNTER — HOSPITAL ENCOUNTER (EMERGENCY)
Age: 50
Discharge: HOME | End: 2020-11-17
Payer: OTHER GOVERNMENT

## 2020-11-17 ENCOUNTER — APPOINTMENT (OUTPATIENT)
Dept: CT IMAGING | Age: 50
End: 2020-11-17
Payer: OTHER GOVERNMENT

## 2020-11-17 ENCOUNTER — HOSPITAL ENCOUNTER (EMERGENCY)
Age: 50
Discharge: HOME | End: 2020-11-17
Attending: EMERGENCY MEDICINE
Payer: OTHER GOVERNMENT

## 2020-11-17 VITALS
HEIGHT: 64 IN | HEART RATE: 115 BPM | DIASTOLIC BLOOD PRESSURE: 82 MMHG | TEMPERATURE: 97.1 F | OXYGEN SATURATION: 98 % | WEIGHT: 179 LBS | RESPIRATION RATE: 16 BRPM | BODY MASS INDEX: 30.56 KG/M2 | SYSTOLIC BLOOD PRESSURE: 113 MMHG

## 2020-11-17 VITALS
OXYGEN SATURATION: 98 % | BODY MASS INDEX: 30.56 KG/M2 | HEART RATE: 92 BPM | TEMPERATURE: 97.5 F | RESPIRATION RATE: 16 BRPM | SYSTOLIC BLOOD PRESSURE: 121 MMHG | WEIGHT: 179 LBS | HEIGHT: 64 IN | DIASTOLIC BLOOD PRESSURE: 69 MMHG

## 2020-11-17 PROCEDURE — 6370000000 HC RX 637 (ALT 250 FOR IP): Performed by: EMERGENCY MEDICINE

## 2020-11-17 PROCEDURE — 99283 EMERGENCY DEPT VISIT LOW MDM: CPT

## 2020-11-17 PROCEDURE — 96374 THER/PROPH/DIAG INJ IV PUSH: CPT

## 2020-11-17 PROCEDURE — 6360000002 HC RX W HCPCS: Performed by: NURSE PRACTITIONER

## 2020-11-17 PROCEDURE — 72131 CT LUMBAR SPINE W/O DYE: CPT

## 2020-11-17 PROCEDURE — 93971 EXTREMITY STUDY: CPT

## 2020-11-17 RX ORDER — KETOROLAC TROMETHAMINE 30 MG/ML
30 INJECTION, SOLUTION INTRAMUSCULAR; INTRAVENOUS ONCE
Status: COMPLETED | OUTPATIENT
Start: 2020-11-17 | End: 2020-11-17

## 2020-11-17 RX ORDER — CYCLOBENZAPRINE HCL 5 MG
5 TABLET ORAL NIGHTLY PRN
Qty: 4 TABLET | Refills: 0 | Status: SHIPPED | OUTPATIENT
Start: 2020-11-17 | End: 2020-11-21

## 2020-11-17 RX ORDER — GABAPENTIN 100 MG/1
200 CAPSULE ORAL 2 TIMES DAILY
COMMUNITY
End: 2021-08-10

## 2020-11-17 RX ORDER — METHYLPREDNISOLONE 4 MG/1
TABLET ORAL
Qty: 1 KIT | Refills: 0 | Status: SHIPPED | OUTPATIENT
Start: 2020-11-17 | End: 2021-04-01

## 2020-11-17 RX ORDER — HYDROCODONE BITARTRATE AND ACETAMINOPHEN 5; 325 MG/1; MG/1
1 TABLET ORAL EVERY 8 HOURS PRN
Qty: 6 TABLET | Refills: 0 | Status: SHIPPED | OUTPATIENT
Start: 2020-11-17 | End: 2020-11-19

## 2020-11-17 RX ORDER — HYDROCODONE BITARTRATE AND ACETAMINOPHEN 5; 325 MG/1; MG/1
1 TABLET ORAL ONCE
Status: COMPLETED | OUTPATIENT
Start: 2020-11-17 | End: 2020-11-17

## 2020-11-17 RX ADMIN — KETOROLAC TROMETHAMINE 30 MG: 30 INJECTION, SOLUTION INTRAMUSCULAR at 20:19

## 2020-11-17 RX ADMIN — HYDROCODONE BITARTRATE AND ACETAMINOPHEN 1 TABLET: 5; 325 TABLET ORAL at 17:17

## 2020-11-17 ASSESSMENT — PAIN DESCRIPTION - ORIENTATION: ORIENTATION: LEFT

## 2020-11-17 ASSESSMENT — ENCOUNTER SYMPTOMS
DIARRHEA: 0
SHORTNESS OF BREATH: 0
EYE PAIN: 0
NAUSEA: 0
WHEEZING: 0
VOMITING: 0
SINUS PRESSURE: 0
ABDOMINAL PAIN: 0
ABDOMINAL DISTENTION: 0
EYE DISCHARGE: 0
BACK PAIN: 1
EYE REDNESS: 0
COUGH: 0
SORE THROAT: 0

## 2020-11-17 ASSESSMENT — PAIN DESCRIPTION - DESCRIPTORS
DESCRIPTORS: CONSTANT;DULL
DESCRIPTORS: SHOOTING;SHARP;RADIATING

## 2020-11-17 ASSESSMENT — PAIN SCALES - GENERAL
PAINLEVEL_OUTOF10: 10

## 2020-11-17 ASSESSMENT — PAIN DESCRIPTION - LOCATION
LOCATION: BACK;LEG
LOCATION: LEG

## 2020-11-17 ASSESSMENT — PAIN DESCRIPTION - PROGRESSION: CLINICAL_PROGRESSION: GRADUALLY WORSENING

## 2020-11-17 ASSESSMENT — PAIN DESCRIPTION - FREQUENCY
FREQUENCY: CONTINUOUS
FREQUENCY: CONTINUOUS

## 2020-11-17 ASSESSMENT — PAIN DESCRIPTION - PAIN TYPE
TYPE: ACUTE PAIN
TYPE: ACUTE PAIN

## 2020-11-17 NOTE — ED PROVIDER NOTES
Department of Emergency Medicine   ED  Provider Note  Admit Date/RoomTime: 11/17/2020  4:55 PM  ED Room: HARPER/HARPER              11/17/20  5:03 PM EST    History of Present Illness:   Dana Perez is a 45 y.o. female presenting to the ED for left calf pain, beginning today. The complaint has been persistent, severe in severity, and worsened by walking. Reports she has note the left calf has swollen in size the past few days and is several inches larger than her right calf which is new. She denies any history of blood clots, she does not currently smoke, no hormone use, no recent immobilizations. She did have surgery for hiatal hernia 3 months ago by Dr. Reshma Partida. She denies any chest pain or shortness of breath. She does report chronic pain to her left upper back and arm for which he is being evaluated by her surgeon for back surgery. She took gabapentin and muscle relaxer today without relief of her pain. Review of Systems:   Pertinent positives and negatives are stated within HPI, all other systems reviewed and are negative. Review of Systems   Constitutional: Negative for chills and fever. HENT: Negative for ear pain, sinus pressure and sore throat. Eyes: Negative for pain, discharge and redness. Respiratory: Negative for cough, shortness of breath and wheezing. Cardiovascular: Negative for chest pain. Gastrointestinal: Negative for abdominal distention, abdominal pain, diarrhea, nausea and vomiting. Genitourinary: Negative for dysuria and frequency. Musculoskeletal: Positive for back pain (chronic). Negative for arthralgias, neck pain and neck stiffness. Skin: Negative for rash and wound. Neurological: Negative for weakness and headaches. Hematological: Negative for adenopathy.    All other systems reviewed and are negative.           --------------------------------------------- PAST HISTORY ---------------------------------------------  Past Medical History:  has a past medical history of Arthritis, Bleeding ulcer, Depression, DJD (degenerative joint disease), Fibromyalgia, GERD (gastroesophageal reflux disease), Hiatal hernia, Osteoarthritis, Osteopenia, and Seizure ([facility] [address] Past Surgical History:  has a past surgical history that includes Hysterectomy; Endometrial ablation; Knee arthroscopy (Left); fracture surgery; ECHO Compl W Dop Color Flow (3/21/2012); Cholecystectomy; back surgery; Breast surgery; Nerve Block; Appendectomy; Colonoscopy; Endoscopy, colon, diagnostic; pr arthrodesis ant interbody inc discectomy, cervical below c2 (N/A, 12/3/2018); Neck surgery (12/2018); and hiatal hernia repair (N/A, 7/28/2020). Social History:  reports that she quit smoking about 7 years ago. She quit after 20.00 years of use. She has never used smokeless tobacco. She reports that she does not drink alcohol or use drugs. Family History: family history is not on file. The patients home medications have been reviewed. Allergies: Patient has no known allergies. ------------------------- NURSING NOTES AND VITALS REVIEWED ---------------------------   The nursing notes within the ED encounter and vital signs as below have been reviewed. /82   Pulse 115   Temp 97.1 °F (36.2 °C) (Infrared)   Resp 16   Ht 5' 4\" (1.626 m)   Wt 179 lb (81.2 kg)   LMP 08/21/2011   SpO2 98%   BMI 30.73 kg/m²   Oxygen Saturation Interpretation: Normal      ---------------------------------------------------PHYSICAL EXAM--------------------------------------    Physical Exam  Vitals signs and nursing note reviewed. Constitutional:       Appearance: She is well-developed. HENT:      Head: Normocephalic and atraumatic. Eyes:      Conjunctiva/sclera: Conjunctivae normal.   Neck:      Musculoskeletal: Normal range of motion and neck supple. Cardiovascular:      Rate and Rhythm: Normal rate and regular rhythm. Heart sounds: Normal heart sounds. No murmur.    Pulmonary: Effort: Pulmonary effort is normal. No respiratory distress. Breath sounds: Normal breath sounds. No wheezing or rales. Abdominal:      General: Bowel sounds are normal.      Palpations: Abdomen is soft. Tenderness: There is no abdominal tenderness. There is no guarding or rebound. Musculoskeletal:         General: Swelling and tenderness (left calf) present. No deformity. Skin:     General: Skin is warm and dry. Neurological:      Mental Status: She is alert and oriented to person, place, and time. Cranial Nerves: No cranial nerve deficit. Coordination: Coordination normal.            -------------------------------------------------- RESULTS -------------------------------------------------  All laboratory and radiology results have been personally reviewed by myself   LABS:  No results found for this visit on 11/17/20. RADIOLOGY:  Interpreted by Radiologist.  [address] [facility], [facility] within [facility],[address] DUP LOWER EXTREMITY LEFT RAF    (Results Pending)           ------------------------------ ED COURSE/MEDICAL DECISION MAKING----------------------  Medications   HYDROcodone-acetaminophen (NORCO) 5-325 MG per tablet 1 tablet (1 tablet Oral Given 11/17/20 1717)       ED Course as of Nov 17 1946   Tue Nov 17, 2020 1718 We apparently do not currently have ultrasound here at this facility at the moment. Patient will be sent for outpatient ultrasound at [facility]. Patient is to stay there until ultrasound is resulted for if positive she will need to be prescribed blood thinners. Patient expressed understanding with the plan    [MF]      ED Course User Index  [MF] Rylan Townsend,           Medical Decision Making:    Patient given symptomatic management due to lack of ultrasound patient will be discharged with prescription for outpatient ultrasound saline at [facility].  She is toward to stay there until I receive the results and I will call her in blood thinners if positive.   Patient expresses verbal understanding we discharge to go to the hospital.     Counseling: The emergency provider has spoken with the patient and discussed todays results, in addition to providing specific details for the plan of care and counseling regarding the diagnosis and prognosis. Questions are answered at this time and they are agreeable with the plan.      --------------------------------- IMPRESSION AND DISPOSITION ---------------------------------    IMPRESSION  1. Left leg pain        DISPOSITION  Disposition: Discharge to home  Patient condition is stable      NOTE: This report was transcribed using voice recognition software.  Effort was made to ensure accuracy; however, inadvertent computerized transcription errors may be present         Giovanny Montoya DO  11/17/20 1946

## 2020-11-18 NOTE — ED PROVIDER NOTES
opinion for her lower back pain. .  ROS   Pertinent positives and negatives are stated within HPI, all other systems reviewed and are negative. Past Surgical History:   Procedure Laterality Date    APPENDECTOMY      BACK SURGERY      dexter x2 in [address]      reduction 6lbs each    CHOLECYSTECTOMY      COLONOSCOPY      ECHO COMPL W DOP COLOR FLOW  3/21/2012         ENDOMETRIAL ABLATION      twice    ENDOSCOPY, COLON, DIAGNOSTIC      FRACTURE SURGERY      HIATAL HERNIA REPAIR N/A 7/28/2020    LAPAROSCOPIC HIATAL HERNIA REPAIR WITH MESH performed by Avis Landin MD at [place] [address] ARTHROSCOPY Left     x 3    NECK SURGERY  12/2018    NERVE BLOCK      [id number] 2018    NE ARTHRODESIS ANT INTERBODY INC DISCECTOMY, CERVICAL BELOW C2 N/A 12/3/2018    CERVICAL  FUSION ANTERIOR C5-6, WITH PLATE, SCREWS, BONE GRAFT, MEDTRONIC ---BOP performed by Nara Joseph MD at [address] [place] [address] Road History:  reports that she quit smoking about 7 years ago. She quit after 20.00 years of use. She has never used smokeless tobacco. She reports that she does not drink alcohol or use drugs. Family History: family history is not on file. Allergies: Patient has no known allergies. Physical Exam           ED Triage Vitals [11/17/20 1844]   BP Temp Temp src Pulse Resp SpO2 Height Weight   115/73 97.5 °F (36.4 °C) -- 115 16 97 % 5' 4\" (1.626 m) 179 lb (81.2 kg)      Oxygen Saturation Interpretation: Normal.    Constitutional:  Alert, development consistent with age. HEENT:  NC/NT. Airway patent. Neck:  Normal ROM. Supple. Respiratory:  Clear to auscultation and breath sounds equal.  CV:  Regular rate and rhythm, normal heart sounds, without pathological murmurs, ectopy, gallops, or rubs. GI:  Abdomen Soft, nontender, good bowel sounds. No firm or pulsatile mass. Back: lower lumbar spine left sided. Tenderness: Moderate. Swelling: no. Range of Motion: full range with pain. CVA Tenderness: No.            Straight leg raising:  Left positive at 30 degrees. Skin:  no erythema, rash or swelling noted. Surgical scar  Distal Function:              Motor deficit: 4/5 strength in left lower which patient states is chronic             Sensory deficit: none. Pulse deficit: none. Calf Tenderness:  No Bilateral.               Edema:  none Both lower extremity(s). Reflexes: Bilateral patellar normal.  Gait:  limp. Integument:  Normal turgor. Warm, dry, without visible rash. Lymphatics: No lymphangitis or adenopathy noted. Neurological:  Oriented. Motor functions intact. Lab / Imaging Results   (All laboratory and radiology results have been personally reviewed by myself)  Labs:  No results found for this visit on 11/17/20. Imaging: All Radiology results interpreted by Radiologist unless otherwise noted. CT LUMBAR SPINE WO CONTRAST   Final Result   No acute fracture or subluxation. L5-S1 anterior and posterior fixation hardware. No acute hardware   complication is evident. Mild multilevel spondylosis. US DUP LOWER EXTREMITY LEFT RAF   Final Result   No evidence of DVT in the left lower extremity. ED Course / Medical Decision Making     Medications   ketorolac (TORADOL) injection 30 mg (30 mg Intravenous Given 11/17/20 2019)        Re-examination:  11/17/20       Time: 2105-Dr. Lauren Brod myself at bedside to reevaluate patient. Patient had some pain reduction after Toradol injection and Norco that was given at [facility] emergency department. Patient has seen Dr. Hallie Lema, neurosurgeon in the past for previous cervical surgery and will be referred to him for further evaluation and outpatient MRI. Patient continues have no neurological deficits. Patients symptoms are improving.     Consult(s):   None    Procedure(s):   none    Medical Decision Making:    Patient discussed todays results, in addition to providing specific details for the plan of care and counseling regarding the diagnosis and prognosis. Questions are answered at this time and they are agreeable with the plan. Assessment      1. Acute exacerbation of chronic low back pain    2. Left lumbar radiculopathy      Plan   Discharge to home  Patient condition is stable    New Medications     New Prescriptions    CYCLOBENZAPRINE (FLEXERIL) 5 MG TABLET    Take 1 tablet by mouth nightly as needed for Muscle spasms    HYDROCODONE-ACETAMINOPHEN (NORCO) 5-325 MG PER TABLET    Take 1 tablet by mouth every 8 hours as needed for Pain for up to 2 days. Intended supply: 3 days. Take lowest dose possible to manage pain    METHYLPREDNISOLONE (MEDROL, TRISHA,) 4 MG TABLET    Take as directed     Electronically signed by YUMIKO Ulloa Cera, CNP   DD: 11/17/20  **This report was transcribed using voice recognition software. Every effort was made to ensure accuracy; however, inadvertent computerized transcription errors may be present.   END OF ED PROVIDER NOTE       YUMIKO Ulloa Cera, CNP  11/17/20 5014

## 2021-03-15 ENCOUNTER — HOSPITAL ENCOUNTER (OUTPATIENT)
Dept: NON INVASIVE DIAGNOSTICS | Age: 51
Discharge: HOME | End: 2021-03-15
Payer: MEDICARE

## 2021-03-15 LAB
LV EF: 63 %
LVEF MODALITY: NORMAL

## 2021-03-15 PROCEDURE — 93306 TTE W/DOPPLER COMPLETE: CPT

## 2021-03-26 ENCOUNTER — HOSPITAL ENCOUNTER (OUTPATIENT)
Dept: SLEEP CENTER | Age: 51
Discharge: HOME | End: 2021-03-26
Payer: MEDICARE

## 2021-03-26 PROCEDURE — 93226 XTRNL ECG REC<48 HR SCAN A/R: CPT

## 2021-03-26 PROCEDURE — 93225 XTRNL ECG REC<48 HRS REC: CPT

## 2021-04-01 ENCOUNTER — HOSPITAL ENCOUNTER (EMERGENCY)
Age: 51
Discharge: HOME | End: 2021-04-01
Attending: STUDENT IN AN ORGANIZED HEALTH CARE EDUCATION/TRAINING PROGRAM
Payer: MEDICARE

## 2021-04-01 VITALS
WEIGHT: 185 LBS | HEART RATE: 68 BPM | DIASTOLIC BLOOD PRESSURE: 62 MMHG | RESPIRATION RATE: 16 BRPM | TEMPERATURE: 98.2 F | OXYGEN SATURATION: 97 % | SYSTOLIC BLOOD PRESSURE: 110 MMHG | HEIGHT: 64 IN | BODY MASS INDEX: 31.58 KG/M2

## 2021-04-01 DIAGNOSIS — J06.9 VIRAL URI WITH COUGH: Primary | ICD-10-CM

## 2021-04-01 PROCEDURE — 99283 EMERGENCY DEPT VISIT LOW MDM: CPT

## 2021-04-01 RX ORDER — BROMPHENIRAMINE MALEATE, PSEUDOEPHEDRINE HYDROCHLORIDE, AND DEXTROMETHORPHAN HYDROBROMIDE 2; 30; 10 MG/5ML; MG/5ML; MG/5ML
5 SYRUP ORAL 4 TIMES DAILY PRN
Qty: 60 ML | Refills: 0 | Status: SHIPPED | OUTPATIENT
Start: 2021-04-01 | End: 2021-04-04

## 2021-04-01 RX ORDER — FLUTICASONE PROPIONATE 50 MCG
2 SPRAY, SUSPENSION (ML) NASAL DAILY
Qty: 1 BOTTLE | Refills: 0 | Status: SHIPPED | OUTPATIENT
Start: 2021-04-01 | End: 2021-08-10

## 2021-04-01 ASSESSMENT — ENCOUNTER SYMPTOMS
RHINORRHEA: 1
SORE THROAT: 0
DIARRHEA: 0
COUGH: 1
PHOTOPHOBIA: 0
SINUS PAIN: 1
NAUSEA: 0
VOMITING: 0

## 2021-04-01 NOTE — ED NOTES
Instructions provided and questions answered. Prescriptions verified with patient.       Lyle Macario RN  04/01/21 7510

## 2021-04-01 NOTE — ED PROVIDER NOTES
Syed Villafuerte is a 25-year-old female with PMH of fibromyalgia, degenerative disc disease, GERD who presented to ED with congestion and cough that has been present for about 2 days and is unchanged. Patient had to call if yesterday for symptoms he cannot get into her PCP. Patient states that she needed a note for work. Patient denies any shortness of breath or chest pain. Patient states that she has a nebulizer at home and had a recent evaluation by her PCP that included EKG and lab work. Patient states that she has symptoms of URI that she gets frequently. Patient quit smoking 1 year ago. Patient denies any productive cough. Patient states that she presented to emergency department today for treatment of congestion and URI symptoms. Patient has tried nebulizer and rest at home without improvement of symptoms. Patient has not had fever, chills, nausea, vomiting. Patient denies any risk for VTE and denies shortness of breath. Patient denies COVID exposure and does not want to be tested for COVID today. The history is provided by the patient and medical records. URI  Presenting symptoms: congestion, cough and rhinorrhea    Presenting symptoms: no ear pain, no facial pain, no fatigue, no fever and no sore throat    Severity:  Moderate  Onset quality:  Gradual  Duration:  2 days  Timing:  Constant  Progression:  Worsening  Chronicity:  Recurrent  Relieved by:  Nothing  Worsened by:  Nothing  Ineffective treatments:  Rest and inhaler  Associated symptoms: sinus pain    Associated symptoms: no arthralgias, no headaches and no neck pain    Risk factors: no recent illness, no recent travel and no sick contacts         Review of Systems   Constitutional: Negative for chills, diaphoresis, fatigue and fever. HENT: Positive for congestion, rhinorrhea and sinus pain. Negative for ear pain and sore throat. Eyes: Negative for photophobia and visual disturbance. Respiratory: Positive for cough. Cardiovascular: Negative for chest pain. Gastrointestinal: Negative for diarrhea, nausea and vomiting. Genitourinary: Negative for dysuria. Musculoskeletal: Negative for arthralgias and neck pain. Skin: Negative for pallor and rash. Allergic/Immunologic: Negative for immunocompromised state. Neurological: Negative for headaches. Psychiatric/Behavioral: Negative for confusion. Physical Exam  Vitals signs and nursing note reviewed. Constitutional:       General: She is not in acute distress. Appearance: Normal appearance. She is not ill-appearing or diaphoretic. HENT:      Head: Normocephalic and atraumatic. Nose: Congestion and rhinorrhea present. Mouth/Throat:      Pharynx: Oropharynx is clear. Eyes:      General: No scleral icterus. Conjunctiva/sclera: Conjunctivae normal.      Pupils: Pupils are equal, round, and reactive to light. Neck:      Musculoskeletal: Normal range of motion and neck supple. No neck rigidity or muscular tenderness. Cardiovascular:      Rate and Rhythm: Normal rate and regular rhythm. Pulmonary:      Effort: Pulmonary effort is normal.      Breath sounds: Normal breath sounds. No stridor. No wheezing, rhonchi or rales. Chest:      Chest wall: No tenderness. Abdominal:      General: Bowel sounds are normal. There is no distension. Palpations: Abdomen is soft. Tenderness: There is no abdominal tenderness. There is no guarding or rebound. Musculoskeletal:      Right lower leg: No edema. Left lower leg: No edema. Skin:     General: Skin is warm and dry. Capillary Refill: Capillary refill takes less than 2 seconds. Coloration: Skin is not pale. Findings: No erythema or rash. Neurological:      Mental Status: She is alert and oriented to person, place, and time.    Psychiatric:         Mood and Affect: Mood normal.          Procedures     MDM  Number of Diagnoses or Management Options  Viral URI with cough  Diagnosis management comments: Naif Earl is a 70-year-old female present emergency department with concern for congestion and URI symptoms. Patient does have a history of smoking and is over 48years old. Patient denies any shortness of breath or chest pain and did not want lab work or chest x-ray at this time. Patient stated that she presented to only have her symptoms of congestion treated. Advised patient to call her primary care physician to arrange close follow-up and to return to emergency department if symptoms change. Patient was initially triaged as shortness of breath but patient denies this. Patient was advised to have a chest x-ray to be evaluated for pneumonia with symptoms. But patient declined. Patient was discharged home with cough medication and Flonase. Patient stated that she does have nebulizers and inhaler at home if needed. Patient's lungs were clear without any wheezing. Discussed plan with patient along with indications return to ED patient is agreeable to plan and well-appearing at time of discharge not in any acute distress with normal vitals. --------------------------------------------- PAST HISTORY ---------------------------------------------  Past Medical History:  has a past medical history of Arthritis, Bleeding ulcer, Depression, DJD (degenerative joint disease), Fibromyalgia, GERD (gastroesophageal reflux disease), Hiatal hernia, Osteoarthritis, Osteopenia, and Seizure ([facility] [address] Past Surgical History:  has a past surgical history that includes Hysterectomy; Endometrial ablation; Knee arthroscopy (Left); fracture surgery; ECHO Compl W Dop Color Flow (3/21/2012); Cholecystectomy; back surgery; Breast surgery; Nerve Block; Appendectomy; Colonoscopy; Endoscopy, colon, diagnostic; pr arthrodesis ant interbody inc discectomy, cervical below c2 (N/A, 12/3/2018); Neck surgery (12/2018); and hiatal hernia repair (N/A, 7/28/2020).     Social History:  reports that she quit smoking about 8 years ago. She quit after 20.00 years of use. She has never used smokeless tobacco. She reports that she does not drink alcohol or use drugs. Family History: family history is not on file. The patients home medications have been reviewed. Allergies: Patient has no known allergies. -------------------------------------------------- RESULTS -------------------------------------------------  Labs:  No results found for this visit on 04/01/21. Radiology:  No orders to display       ------------------------- NURSING NOTES AND VITALS REVIEWED ---------------------------  Date / Time Roomed:  4/1/2021  4:59 AM  ED Bed Assignment:  05/05    The nursing notes within the ED encounter and vital signs as below have been reviewed. /62   Pulse 68   Temp 98.2 °F (36.8 °C) (Oral)   Resp 16   Ht 5' 4\" (1.626 m)   Wt 185 lb (83.9 kg)   LMP 08/21/2011   SpO2 97%   BMI 31.76 kg/m²   Oxygen Saturation Interpretation: Normal      ------------------------------------------ PROGRESS NOTES ------------------------------------------  5:22 AM EDT  I have spoken with the patient and discussed todays results, in addition to providing specific details for the plan of care and counseling regarding the diagnosis and prognosis. Their questions are answered at this time and they are agreeable with the plan. I discussed at length with them reasons for immediate return here for re evaluation. They will followup with their primary care physician by calling their office tomorrow. --------------------------------- ADDITIONAL PROVIDER NOTES ---------------------------------  At this time the patient is without objective evidence of an acute process requiring hospitalization or inpatient management. They have remained hemodynamically stable throughout their entire ED visit and are stable for discharge with outpatient follow-up.      The plan has been discussed in detail and they are aware of the specific conditions for emergent return, as well as the importance of follow-up. Discharge Medication List as of 4/1/2021  5:28 AM      START taking these medications    Details   brompheniramine-pseudoephedrine-DM 2-30-10 MG/5ML syrup Take 5 mLs by mouth 4 times daily as needed for Congestion or Cough, Disp-60 mL, R-0Print      fluticasone (FLONASE) 50 MCG/ACT nasal spray 2 sprays by Each Nostril route daily, Disp-1 Bottle, R-0Print             Diagnosis:  1. Viral URI with cough        Disposition:  Patient's disposition: Discharge to home  Patient's condition is stable.                   Nathan Zamarripa MD  Resident  04/01/21 5323

## 2021-04-14 ENCOUNTER — HOSPITAL ENCOUNTER (EMERGENCY)
Age: 51
Discharge: HOME | End: 2021-04-14
Attending: EMERGENCY MEDICINE
Payer: MEDICARE

## 2021-04-14 ENCOUNTER — APPOINTMENT (OUTPATIENT)
Dept: GENERAL RADIOLOGY | Age: 51
End: 2021-04-14
Payer: MEDICARE

## 2021-04-14 VITALS
SYSTOLIC BLOOD PRESSURE: 112 MMHG | BODY MASS INDEX: 30.73 KG/M2 | HEART RATE: 86 BPM | HEIGHT: 64 IN | DIASTOLIC BLOOD PRESSURE: 60 MMHG | TEMPERATURE: 98.1 F | WEIGHT: 180 LBS | RESPIRATION RATE: 16 BRPM | OXYGEN SATURATION: 100 %

## 2021-04-14 DIAGNOSIS — R00.2 PALPITATIONS: ICD-10-CM

## 2021-04-14 DIAGNOSIS — R07.89 ATYPICAL CHEST PAIN: Primary | ICD-10-CM

## 2021-04-14 LAB
ANION GAP SERPL CALCULATED.3IONS-SCNC: 18 MMOL/L (ref 7–16)
BASOPHILS ABSOLUTE: 0.06 E9/L (ref 0–0.2)
BASOPHILS RELATIVE PERCENT: 0.6 % (ref 0–2)
BUN BLDV-MCNC: 15 MG/DL (ref 6–20)
CALCIUM SERPL-MCNC: 9.7 MG/DL (ref 8.6–10.2)
CHLORIDE BLD-SCNC: 103 MMOL/L (ref 98–107)
CO2: 19 MMOL/L (ref 22–29)
CREAT SERPL-MCNC: 0.8 MG/DL (ref 0.5–1)
D DIMER: <200 NG/ML DDU
EOSINOPHILS ABSOLUTE: 0.11 E9/L (ref 0.05–0.5)
EOSINOPHILS RELATIVE PERCENT: 1.1 % (ref 0–6)
GFR AFRICAN AMERICAN: >60
GFR NON-AFRICAN AMERICAN: >60 ML/MIN/1.73
GLUCOSE BLD-MCNC: 87 MG/DL (ref 74–99)
HCT VFR BLD CALC: 35.4 % (ref 34–48)
HEMOGLOBIN: 12.3 G/DL (ref 11.5–15.5)
IMMATURE GRANULOCYTES #: 0.04 E9/L
IMMATURE GRANULOCYTES %: 0.4 % (ref 0–5)
LYMPHOCYTES ABSOLUTE: 3.44 E9/L (ref 1.5–4)
LYMPHOCYTES RELATIVE PERCENT: 35.8 % (ref 20–42)
MCH RBC QN AUTO: 30.7 PG (ref 26–35)
MCHC RBC AUTO-ENTMCNC: 34.7 % (ref 32–34.5)
MCV RBC AUTO: 88.3 FL (ref 80–99.9)
MONOCYTES ABSOLUTE: 0.63 E9/L (ref 0.1–0.95)
MONOCYTES RELATIVE PERCENT: 6.5 % (ref 2–12)
NEUTROPHILS ABSOLUTE: 5.34 E9/L (ref 1.8–7.3)
NEUTROPHILS RELATIVE PERCENT: 55.6 % (ref 43–80)
PDW BLD-RTO: 13.6 FL (ref 11.5–15)
PLATELET # BLD: 370 E9/L (ref 130–450)
PMV BLD AUTO: 10.5 FL (ref 7–12)
POTASSIUM SERPL-SCNC: 3.5 MMOL/L (ref 3.5–5)
PRO-BNP: 44 PG/ML (ref 0–125)
RBC # BLD: 4.01 E12/L (ref 3.5–5.5)
SODIUM BLD-SCNC: 140 MMOL/L (ref 132–146)
TROPONIN: <0.01 NG/ML (ref 0–0.03)
WBC # BLD: 9.6 E9/L (ref 4.5–11.5)

## 2021-04-14 PROCEDURE — 96375 TX/PRO/DX INJ NEW DRUG ADDON: CPT

## 2021-04-14 PROCEDURE — 85025 COMPLETE CBC W/AUTO DIFF WBC: CPT

## 2021-04-14 PROCEDURE — 80048 BASIC METABOLIC PNL TOTAL CA: CPT

## 2021-04-14 PROCEDURE — 71045 X-RAY EXAM CHEST 1 VIEW: CPT

## 2021-04-14 PROCEDURE — 85378 FIBRIN DEGRADE SEMIQUANT: CPT

## 2021-04-14 PROCEDURE — 93005 ELECTROCARDIOGRAM TRACING: CPT | Performed by: EMERGENCY MEDICINE

## 2021-04-14 PROCEDURE — 99284 EMERGENCY DEPT VISIT MOD MDM: CPT

## 2021-04-14 PROCEDURE — 83880 ASSAY OF NATRIURETIC PEPTIDE: CPT

## 2021-04-14 PROCEDURE — 6360000002 HC RX W HCPCS: Performed by: EMERGENCY MEDICINE

## 2021-04-14 PROCEDURE — 6370000000 HC RX 637 (ALT 250 FOR IP): Performed by: EMERGENCY MEDICINE

## 2021-04-14 PROCEDURE — 84484 ASSAY OF TROPONIN QUANT: CPT

## 2021-04-14 PROCEDURE — 36415 COLL VENOUS BLD VENIPUNCTURE: CPT

## 2021-04-14 PROCEDURE — 96374 THER/PROPH/DIAG INJ IV PUSH: CPT

## 2021-04-14 RX ORDER — KETOROLAC TROMETHAMINE 30 MG/ML
15 INJECTION, SOLUTION INTRAMUSCULAR; INTRAVENOUS ONCE
Status: COMPLETED | OUTPATIENT
Start: 2021-04-14 | End: 2021-04-14

## 2021-04-14 RX ORDER — ONDANSETRON 2 MG/ML
8 INJECTION INTRAMUSCULAR; INTRAVENOUS ONCE
Status: COMPLETED | OUTPATIENT
Start: 2021-04-14 | End: 2021-04-14

## 2021-04-14 RX ADMIN — LIDOCAINE HYDROCHLORIDE: 20 SOLUTION ORAL; TOPICAL at 16:51

## 2021-04-14 RX ADMIN — ONDANSETRON 8 MG: 2 INJECTION INTRAMUSCULAR; INTRAVENOUS at 16:27

## 2021-04-14 RX ADMIN — KETOROLAC TROMETHAMINE 15 MG: 30 INJECTION, SOLUTION INTRAMUSCULAR; INTRAVENOUS at 17:21

## 2021-04-14 ASSESSMENT — ENCOUNTER SYMPTOMS
WHEEZING: 0
SINUS PRESSURE: 0
ABDOMINAL DISTENTION: 0
BACK PAIN: 0
CHEST TIGHTNESS: 1
COUGH: 0
VOMITING: 0
EYE PAIN: 0
DIARRHEA: 0
SHORTNESS OF BREATH: 1
EYE REDNESS: 0
SORE THROAT: 0
NAUSEA: 1
EYE DISCHARGE: 0
ABDOMINAL PAIN: 1

## 2021-04-14 ASSESSMENT — PAIN DESCRIPTION - ORIENTATION: ORIENTATION: LEFT

## 2021-04-14 ASSESSMENT — PAIN DESCRIPTION - LOCATION: LOCATION: CHEST

## 2021-04-14 NOTE — ED PROVIDER NOTES
Department of Emergency Medicine   ED  Provider Note  Admit Date/RoomTime: 4/14/2021  3:14 PM  ED Room: 15/15              4/14/21  3:36 PM EDT    History of Present Illness:   Jose Tamayo is a 45 y.o. female presenting to the ED for chest heaviness, beginning 12:30-1pm.  The complaint has been constant, moderate in severity, and worsened by nothing. Patient works at work suddenly developed tachycardia with a heart rate of 140 with chest heaviness and shortness of breath and paresthesias. Patient reports her symptoms have been constant since the onset. Patient where she been having issues with the symptoms for the past several months after an argument with her son when her heart rate skyrocketed to the 100s and her blood pressure skyrocketed to 140/90 which is very high for her she reports she normally has low blood pressure and low pulse. She reports she recently had a stress test, echocardiogram and Holter monitor for the symptoms as scheduled to follow-up with her doctor, Dr. Jhony Bailey tomorrow to review the results of these tests. Patient reports she has chronic nausea from her hiatal hernia and gastric ulcers but ran out of her Zofran. She reports she is allowed to take aspirin due to her gastric ulcers. He has not taken anything this afternoon for her symptoms as presented here. Review of Systems:   Pertinent positives and negatives are stated within HPI, all other systems reviewed and are negative. Review of Systems   Constitutional: Negative for chills and fever. HENT: Negative for ear pain, sinus pressure and sore throat. Eyes: Negative for pain, discharge and redness. Respiratory: Positive for chest tightness and shortness of breath. Negative for cough and wheezing. Cardiovascular: Positive for chest pain and palpitations. Negative for leg swelling. Gastrointestinal: Positive for abdominal pain and nausea. Negative for abdominal distention, diarrhea and vomiting. Genitourinary: Negative for dysuria and frequency. Musculoskeletal: Negative for arthralgias and back pain. Skin: Negative for rash and wound. Neurological: Negative for weakness and headaches. Hematological: Negative for adenopathy. All other systems reviewed and are negative.           --------------------------------------------- PAST HISTORY ---------------------------------------------  Past Medical History:  has a past medical history of Arthritis, Bleeding ulcer, Depression, DJD (degenerative joint disease), Fibromyalgia, GERD (gastroesophageal reflux disease), Hiatal hernia, Osteoarthritis, Osteopenia, and Seizure ([facility] [address] Past Surgical History:  has a past surgical history that includes Hysterectomy; Endometrial ablation; Knee arthroscopy (Left); fracture surgery; ECHO Compl W Dop Color Flow (3/21/2012); Cholecystectomy; back surgery; Breast surgery; Nerve Block; Appendectomy; Colonoscopy; Endoscopy, colon, diagnostic; pr arthrodesis ant interbody inc discectomy, cervical below c2 (N/A, 12/3/2018); Neck surgery (12/2018); and hiatal hernia repair (N/A, 7/28/2020). Social History:  reports that she quit smoking about 8 years ago. She quit after 20.00 years of use. She has never used smokeless tobacco. She reports that she does not drink alcohol or use drugs. Family History: family history is not on file. The patients home medications have been reviewed. Allergies: Patient has no known allergies. ------------------------- NURSING NOTES AND VITALS REVIEWED ---------------------------   The nursing notes within the ED encounter and vital signs as below have been reviewed.    /60   Pulse 86   Temp 98.1 °F (36.7 °C) (Oral)   Resp 16   Ht 5' 4\" (1.626 m)   Wt 180 lb (81.6 kg)   LMP 08/21/2011   SpO2 100%   BMI 30.90 kg/m²   Oxygen Saturation Interpretation: Normal      ---------------------------------------------------PHYSICAL EXAM--------------------------------------    Physical Exam  Vitals signs and nursing note reviewed. Constitutional:       Appearance: She is well-developed. HENT:      Head: Normocephalic and atraumatic. Eyes:      Conjunctiva/sclera: Conjunctivae normal.   Neck:      Musculoskeletal: Normal range of motion and neck supple. Cardiovascular:      Rate and Rhythm: Normal rate and regular rhythm. Pulses: Normal pulses. Heart sounds: Normal heart sounds. No murmur. Pulmonary:      Effort: Pulmonary effort is normal. No respiratory distress. Breath sounds: Normal breath sounds. No wheezing or rales. Abdominal:      General: Bowel sounds are normal.      Palpations: Abdomen is soft. Tenderness: There is abdominal tenderness (epigatric). There is no guarding or rebound. Skin:     General: Skin is warm and dry. Neurological:      Mental Status: She is alert and oriented to person, place, and time. Cranial Nerves: No cranial nerve deficit.       Coordination: Coordination normal.            -------------------------------------------------- RESULTS -------------------------------------------------  All laboratory and radiology results have been personally reviewed by myself   LABS:  Results for orders placed or performed during the hospital encounter of 04/14/21   CBC Auto Differential   Result Value Ref Range    WBC 9.6 4.5 - 11.5 E9/L    RBC 4.01 3.50 - 5.50 E12/L    Hemoglobin 12.3 11.5 - 15.5 g/dL    Hematocrit 35.4 34.0 - 48.0 %    MCV 88.3 80.0 - 99.9 fL    MCH 30.7 26.0 - 35.0 pg    MCHC 34.7 (H) 32.0 - 34.5 %    RDW 13.6 11.5 - 15.0 fL    Platelets 248 677 - 772 E9/L    MPV 10.5 7.0 - 12.0 fL    Neutrophils % 55.6 43.0 - 80.0 %    Immature Granulocytes % 0.4 0.0 - 5.0 %    Lymphocytes % 35.8 20.0 - 42.0 %    Monocytes % 6.5 2.0 - 12.0 %    Eosinophils % 1.1 0.0 - 6.0 %    Basophils % 0.6 0.0 - 2.0 %    Neutrophils Absolute 5.34 1.80 - 7.30 E9/L    Immature Granulocytes # 0.04 E9/L    Lymphocytes Absolute 3.44 1.50 - 4.00 E9/L    Monocytes Absolute 0.63 0.10 - 0.95 E9/L    Eosinophils Absolute 0.11 0.05 - 0.50 E9/L    Basophils Absolute 0.06 0.00 - 0.20 X0/M   Basic Metabolic Panel   Result Value Ref Range    Sodium 140 132 - 146 mmol/L    Potassium 3.5 3.5 - 5.0 mmol/L    Chloride 103 98 - 107 mmol/L    CO2 19 (L) 22 - 29 mmol/L    Anion Gap 18 (H) 7 - 16 mmol/L    Glucose 87 74 - 99 mg/dL    BUN 15 6 - 20 mg/dL    CREATININE 0.8 0.5 - 1.0 mg/dL    GFR Non-African American >60 >=60 mL/min/1.73    GFR African American >60     Calcium 9.7 8.6 - 10.2 mg/dL   Brain Natriuretic Peptide   Result Value Ref Range    Pro-BNP 44 0 - 125 pg/mL   Troponin   Result Value Ref Range    Troponin <0.01 0.00 - 0.03 ng/mL   D-dimer, quantitative   Result Value Ref Range    D-Dimer, Quant <200 ng/mL DDU       RADIOLOGY:  Interpreted by Radiologist.  XR CHEST PORTABLE   Final Result   No acute process. EKG:  This EKG is signed by emergency department physician. Rate: 92  Rhythm: Sinus  Interpretation: non-specific EKG, cannot rule out old inferior or anterior infarct  Comparison: stable as compared to patient's most recent EKG 6/4/19      ------------------------------ ED COURSE/MEDICAL DECISION MAKING----------------------  Medications   ondansetron (ZOFRAN) injection 8 mg (8 mg Intravenous Given 4/14/21 1627)   aluminum & magnesium hydroxide-simethicone (MAALOX) 30 mL, lidocaine viscous hcl (XYLOCAINE) 5 mL (GI COCKTAIL) ( Oral Given 4/14/21 1651)   ketorolac (TORADOL) injection 15 mg (15 mg Intravenous Given 4/14/21 1721)       ED Course as of Apr 14 1813   Wed Apr 14, 2021   1718 Patient still having pain reports generally he is ibuprofen despite making her ulcers worse as her insurance will not cover Naprosyn would like to try Toradol. Discussed patient that also can affect her ulcers but she would still like to try the medicine.     [MF]      ED Course User Index  [MF] Alpa Collier, DO          Medical Decision Makin-year-old female with episodes of tachycardia and mildly elevated blood pressure in the setting of stress and hiatal hernia pain. Patient reports her chest pain is typical for her hiatal hernia pain which states she has been taking ibuprofen for them though she knows it can make her ulcers and therefore the pain worse. unremarkable work-up here she is not tachycardic, normal blood pressure symptoms improved after some symptomatic care laboratory studies otherwise unremarkable negative D-dimer limit negative troponin. Patient reports she recently had echo, stress test, Holter monitor. Patient to be discharged home to follow with her family doctor as scheduled tomorrow given return precautions    Counseling: The emergency provider has spoken with the patient and discussed todays results, in addition to providing specific details for the plan of care and counseling regarding the diagnosis and prognosis. Questions are answered at this time and they are agreeable with the plan.      --------------------------------- IMPRESSION AND DISPOSITION ---------------------------------    IMPRESSION  1. Atypical chest pain    2. Palpitations        DISPOSITION  Disposition: Discharge to home  Patient condition is stable      Dr. James REGAN, am the primary doctor of record. NOTE: This report was transcribed using voice recognition software.  Effort was made to ensure accuracy; however, inadvertent computerized transcription errors may be present         James Mccain DO  21 1242

## 2021-04-14 NOTE — ED NOTES
Discharged  To follow with pmd  appt with pmd on 4/15 for monitor Dr Johann Bradley, RN  04/14/21 [id number]

## 2021-04-16 LAB
EKG ATRIAL RATE: 92 BPM
EKG P AXIS: 49 DEGREES
EKG P-R INTERVAL: 136 MS
EKG Q-T INTERVAL: 380 MS
EKG QRS DURATION: 80 MS
EKG QTC CALCULATION (BAZETT): 469 MS
EKG R AXIS: 18 DEGREES
EKG T AXIS: 45 DEGREES
EKG VENTRICULAR RATE: 92 BPM

## 2021-04-16 PROCEDURE — 93010 ELECTROCARDIOGRAM REPORT: CPT | Performed by: INTERNAL MEDICINE

## 2021-07-21 ENCOUNTER — APPOINTMENT (OUTPATIENT)
Dept: GENERAL RADIOLOGY | Age: 51
End: 2021-07-21
Payer: MEDICARE

## 2021-07-21 ENCOUNTER — HOSPITAL ENCOUNTER (EMERGENCY)
Age: 51
Discharge: HOME | End: 2021-07-21
Payer: MEDICARE

## 2021-07-21 VITALS
OXYGEN SATURATION: 100 % | WEIGHT: 187 LBS | HEART RATE: 78 BPM | SYSTOLIC BLOOD PRESSURE: 128 MMHG | DIASTOLIC BLOOD PRESSURE: 78 MMHG | RESPIRATION RATE: 16 BRPM | BODY MASS INDEX: 31.92 KG/M2 | TEMPERATURE: 96.8 F | HEIGHT: 64 IN

## 2021-07-21 DIAGNOSIS — S62.502A CLOSED NONDISPLACED FRACTURE OF PHALANX OF LEFT THUMB, UNSPECIFIED PHALANX, INITIAL ENCOUNTER: Primary | ICD-10-CM

## 2021-07-21 PROCEDURE — 99283 EMERGENCY DEPT VISIT LOW MDM: CPT

## 2021-07-21 PROCEDURE — 73130 X-RAY EXAM OF HAND: CPT

## 2021-07-21 PROCEDURE — 6370000000 HC RX 637 (ALT 250 FOR IP): Performed by: NURSE PRACTITIONER

## 2021-07-21 PROCEDURE — 29125 APPL SHORT ARM SPLINT STATIC: CPT

## 2021-07-21 RX ORDER — IBUPROFEN 800 MG/1
800 TABLET ORAL ONCE
Status: COMPLETED | OUTPATIENT
Start: 2021-07-21 | End: 2021-07-21

## 2021-07-21 RX ORDER — IBUPROFEN 600 MG/1
600 TABLET ORAL 3 TIMES DAILY PRN
Qty: 30 TABLET | Refills: 0 | Status: SHIPPED | OUTPATIENT
Start: 2021-07-21

## 2021-07-21 RX ADMIN — IBUPROFEN 800 MG: 800 TABLET, FILM COATED ORAL at 17:46

## 2021-07-21 ASSESSMENT — PAIN DESCRIPTION - ORIENTATION: ORIENTATION: LEFT

## 2021-07-21 ASSESSMENT — PAIN SCALES - GENERAL
PAINLEVEL_OUTOF10: 9
PAINLEVEL_OUTOF10: 8

## 2021-07-21 ASSESSMENT — PAIN DESCRIPTION - LOCATION: LOCATION: FINGER (COMMENT WHICH ONE)

## 2021-07-22 NOTE — ED PROVIDER NOTES
she does not drink alcohol and does not use drugs. Family History: family history is not on file. Allergies: Patient has no known allergies. Physical Exam   Oxygen Saturation Interpretation: Normal.        ED Triage Vitals   BP Temp Temp src Pulse Resp SpO2 Height Weight   07/21/21 1653 07/21/21 1653 -- 07/21/21 1653 07/21/21 1653 07/21/21 1653 07/21/21 1705 07/21/21 1705   128/78 96.8 °F (36 °C)  78 16 100 % 5' 4\" (1.626 m) 187 lb (84.8 kg)         Constitutional:  Alert, development consistent with age. Neck:  Normal ROM. Supple. Non-tender. Fingers:  Left Thumb proximal phalanx dorsal aspect            Tenderness:  moderate. Swelling: Mild. Deformity: no deformity observed/palpated. ROM: diminished range with pain. Skin:  tenderness and swelling. Neurovascular: Motor deficit: none. Sensory deficit:   none. Pulse deficit: none. Capillary refill: normal.  Bilateral Hand: all metacarpals. Tenderness: none. Swelling: None. Deformity: no.             Skin:  no wounds, erythema, or swelling. Bilateral Wrist:               Tenderness:  none. Swelling: None. Deformity: no deformity observed/palpated. ROM: full range of motion. Skin:  no wounds, erythema, or swelling. Lymphatics: No lymphangitis or adenopathy noted. Neurological:  Oriented. Motor functions intact. t. Lab / Imaging Results   (All laboratory and radiology results have been personally reviewed by myself)  Labs:  No results found for this visit on 07/21/21. Imaging: All Radiology results interpreted by Radiologist unless otherwise noted. XR HAND LEFT (MIN 3 VIEWS)   Final Result   Addendum 1 of 1   ADDENDUM:   There is no evidence of acute fracture. There is normal alignment. No    acute   joint abnormality. No focal osseous lesion.  No focal soft tissue    abnormality. Final   No acute osseous abnormality. ED Course / Medical Decision Making     Medications   ibuprofen (ADVIL;MOTRIN) tablet 800 mg (800 mg Oral Given 7/21/21 9901)        Consult(s):   None    Procedure(s):  PROCEDURE NOTE  7/22/21           SPLINT  APPLICATION  Risks, benefits and alternatives (for applicable procedures below) described. Performed By: YUMIKO Conley AND HAKEEM SHAFER. Indication:  fracture of LEFT THUMB . Procedure:   A short  left arm  THUMB SPICA splint was applied by HAKEEM AND MYSELF. The patient tolerated the procedure well. Patient remains neurologically vascularly intact following the procedure. Capillary refill is brisk. Sensations are intact. To the LEFT  left hand and fingers. MDM:      Imaging was obtained based on high suspicion for fracture / bony abnormality as per history/physical findings. Plan is subsequently for symptom control, limited use and  immobilization with appropriate outpatient follow-up. Patient at this time has very limited range of motion to the left thumb due to her symptoms and pinpoint tenderness patient was placed in a thumb spica splint. Patient's x-rays were reviewed and Dr. Luisa Yee ED attending also viewed the films at this time radiologist reads the report as no acute osseous abnormality however looking at the films there is a high suspicion for fracture patient will be treated conservatively for fracture with splint. She is agreeable to plan of care questions were answered. Patient states that she sees Dr. Selena Guerrero at [facility] orthopedic and would like to follow-up with him. Patient was educated educated on keeping the splint clean dry and elevating the arm. Patient stable for close outpatient follow-up.     Plan of Care/Counseling:  YUMIKO Conley CNP reviewed today's visit with the patient in addition to providing specific details for the plan of care and counseling regarding the diagnosis and prognosis. Questions are answered at this time and are agreeable with the plan. Assessment      1. Closed nondisplaced fracture of phalanx of left thumb, unspecified phalanx, initial encounter      Plan   Discharged home. Patient condition is good    New Medications     Discharge Medication List as of 7/21/2021  6:43 PM      START taking these medications    Details   ibuprofen (ADVIL;MOTRIN) 600 MG tablet Take 1 tablet by mouth 3 times daily as needed for Pain, Disp-30 tablet, R-0Normal           Electronically signed by YUMIKO Daigle CNP   DD: 7/22/21  **This report was transcribed using voice recognition software. Every effort was made to ensure accuracy; however, inadvertent computerized transcription errors may be present.   END OF ED PROVIDER NOTE       YUMIKO Land CNP  07/22/21 [address] [place] [address], YUMIKO Javier CNP  07/22/21 [id number]

## 2021-08-10 ENCOUNTER — HOSPITAL ENCOUNTER (EMERGENCY)
Age: 51
Discharge: HOME | End: 2021-08-10
Attending: EMERGENCY MEDICINE
Payer: MEDICARE

## 2021-08-10 ENCOUNTER — APPOINTMENT (OUTPATIENT)
Dept: CT IMAGING | Age: 51
End: 2021-08-10
Payer: MEDICARE

## 2021-08-10 VITALS
HEART RATE: 68 BPM | RESPIRATION RATE: 16 BRPM | OXYGEN SATURATION: 97 % | DIASTOLIC BLOOD PRESSURE: 56 MMHG | TEMPERATURE: 97.5 F | SYSTOLIC BLOOD PRESSURE: 111 MMHG

## 2021-08-10 DIAGNOSIS — R10.30 LOWER ABDOMINAL PAIN: Primary | ICD-10-CM

## 2021-08-10 DIAGNOSIS — K57.32 DIVERTICULITIS OF COLON: ICD-10-CM

## 2021-08-10 LAB
ALBUMIN SERPL-MCNC: 4.3 G/DL (ref 3.5–5.2)
ALP BLD-CCNC: 69 U/L (ref 35–104)
ALT SERPL-CCNC: 5 U/L (ref 0–32)
ANION GAP SERPL CALCULATED.3IONS-SCNC: 14 MMOL/L (ref 7–16)
AST SERPL-CCNC: 17 U/L (ref 0–31)
BASOPHILS ABSOLUTE: 0.03 E9/L (ref 0–0.2)
BASOPHILS RELATIVE PERCENT: 0.4 % (ref 0–2)
BILIRUB SERPL-MCNC: 0.3 MG/DL (ref 0–1.2)
BILIRUBIN URINE: ABNORMAL
BLOOD, URINE: NEGATIVE
BUN BLDV-MCNC: 18 MG/DL (ref 6–20)
CALCIUM SERPL-MCNC: 9.7 MG/DL (ref 8.6–10.2)
CHLORIDE BLD-SCNC: 108 MMOL/L (ref 98–107)
CLARITY: CLEAR
CO2: 20 MMOL/L (ref 22–29)
COLOR: YELLOW
CREAT SERPL-MCNC: 0.7 MG/DL (ref 0.5–1)
EOSINOPHILS ABSOLUTE: 0.06 E9/L (ref 0.05–0.5)
EOSINOPHILS RELATIVE PERCENT: 0.8 % (ref 0–6)
GFR AFRICAN AMERICAN: >60
GFR NON-AFRICAN AMERICAN: >60 ML/MIN/1.73
GLUCOSE BLD-MCNC: 104 MG/DL (ref 74–99)
GLUCOSE URINE: NEGATIVE MG/DL
HCT VFR BLD CALC: 38.5 % (ref 34–48)
HEMOGLOBIN: 13.6 G/DL (ref 11.5–15.5)
IMMATURE GRANULOCYTES #: 0.03 E9/L
IMMATURE GRANULOCYTES %: 0.4 % (ref 0–5)
KETONES, URINE: 15 MG/DL
LACTIC ACID: 1.3 MMOL/L (ref 0.5–2.2)
LEUKOCYTE ESTERASE, URINE: NEGATIVE
LIPASE: 17 U/L (ref 13–60)
LYMPHOCYTES ABSOLUTE: 2.41 E9/L (ref 1.5–4)
LYMPHOCYTES RELATIVE PERCENT: 33.7 % (ref 20–42)
MCH RBC QN AUTO: 30.1 PG (ref 26–35)
MCHC RBC AUTO-ENTMCNC: 35.3 % (ref 32–34.5)
MCV RBC AUTO: 85.2 FL (ref 80–99.9)
MONOCYTES ABSOLUTE: 0.5 E9/L (ref 0.1–0.95)
MONOCYTES RELATIVE PERCENT: 7 % (ref 2–12)
NEUTROPHILS ABSOLUTE: 4.13 E9/L (ref 1.8–7.3)
NEUTROPHILS RELATIVE PERCENT: 57.7 % (ref 43–80)
NITRITE, URINE: NEGATIVE
PDW BLD-RTO: 15.9 FL (ref 11.5–15)
PH UA: 5 (ref 5–9)
PLATELET # BLD: 295 E9/L (ref 130–450)
PMV BLD AUTO: 11.1 FL (ref 7–12)
POTASSIUM REFLEX MAGNESIUM: 4 MMOL/L (ref 3.5–5)
PROTEIN UA: NEGATIVE MG/DL
RBC # BLD: 4.52 E12/L (ref 3.5–5.5)
SODIUM BLD-SCNC: 142 MMOL/L (ref 132–146)
SPECIFIC GRAVITY UA: >=1.03 (ref 1–1.03)
TOTAL PROTEIN: 7.4 G/DL (ref 6.4–8.3)
UROBILINOGEN, URINE: 0.2 E.U./DL
WBC # BLD: 7.2 E9/L (ref 4.5–11.5)

## 2021-08-10 PROCEDURE — 96375 TX/PRO/DX INJ NEW DRUG ADDON: CPT

## 2021-08-10 PROCEDURE — 6360000002 HC RX W HCPCS: Performed by: EMERGENCY MEDICINE

## 2021-08-10 PROCEDURE — 85025 COMPLETE CBC W/AUTO DIFF WBC: CPT

## 2021-08-10 PROCEDURE — 83690 ASSAY OF LIPASE: CPT

## 2021-08-10 PROCEDURE — 6360000004 HC RX CONTRAST MEDICATION: Performed by: RADIOLOGY

## 2021-08-10 PROCEDURE — 81003 URINALYSIS AUTO W/O SCOPE: CPT

## 2021-08-10 PROCEDURE — 2580000003 HC RX 258: Performed by: EMERGENCY MEDICINE

## 2021-08-10 PROCEDURE — 74177 CT ABD & PELVIS W/CONTRAST: CPT

## 2021-08-10 PROCEDURE — 99285 EMERGENCY DEPT VISIT HI MDM: CPT

## 2021-08-10 PROCEDURE — 6370000000 HC RX 637 (ALT 250 FOR IP): Performed by: EMERGENCY MEDICINE

## 2021-08-10 PROCEDURE — 36415 COLL VENOUS BLD VENIPUNCTURE: CPT

## 2021-08-10 PROCEDURE — 83605 ASSAY OF LACTIC ACID: CPT

## 2021-08-10 PROCEDURE — 96374 THER/PROPH/DIAG INJ IV PUSH: CPT

## 2021-08-10 PROCEDURE — 80053 COMPREHEN METABOLIC PANEL: CPT

## 2021-08-10 PROCEDURE — 96372 THER/PROPH/DIAG INJ SC/IM: CPT

## 2021-08-10 RX ORDER — 0.9 % SODIUM CHLORIDE 0.9 %
1000 INTRAVENOUS SOLUTION INTRAVENOUS ONCE
Status: COMPLETED | OUTPATIENT
Start: 2021-08-10 | End: 2021-08-10

## 2021-08-10 RX ORDER — METRONIDAZOLE 500 MG/1
500 TABLET ORAL ONCE
Status: COMPLETED | OUTPATIENT
Start: 2021-08-10 | End: 2021-08-10

## 2021-08-10 RX ORDER — METRONIDAZOLE 500 MG/1
500 TABLET ORAL 3 TIMES DAILY
Qty: 30 TABLET | Refills: 0 | Status: SHIPPED | OUTPATIENT
Start: 2021-08-10 | End: 2021-08-20

## 2021-08-10 RX ORDER — KETOROLAC TROMETHAMINE 30 MG/ML
30 INJECTION, SOLUTION INTRAMUSCULAR; INTRAVENOUS ONCE
Status: COMPLETED | OUTPATIENT
Start: 2021-08-10 | End: 2021-08-10

## 2021-08-10 RX ORDER — CEFDINIR 300 MG/1
300 CAPSULE ORAL ONCE
Status: COMPLETED | OUTPATIENT
Start: 2021-08-10 | End: 2021-08-10

## 2021-08-10 RX ORDER — CEFDINIR 300 MG/1
300 CAPSULE ORAL 2 TIMES DAILY
Qty: 20 CAPSULE | Refills: 0 | Status: SHIPPED | OUTPATIENT
Start: 2021-08-10 | End: 2021-08-20

## 2021-08-10 RX ORDER — NAPROXEN 250 MG/1
250 TABLET ORAL 2 TIMES DAILY
Qty: 14 TABLET | Refills: 0 | Status: SHIPPED | OUTPATIENT
Start: 2021-08-10 | End: 2021-08-17

## 2021-08-10 RX ORDER — DICYCLOMINE HYDROCHLORIDE 10 MG/ML
20 INJECTION INTRAMUSCULAR ONCE
Status: COMPLETED | OUTPATIENT
Start: 2021-08-10 | End: 2021-08-10

## 2021-08-10 RX ORDER — ONDANSETRON 2 MG/ML
4 INJECTION INTRAMUSCULAR; INTRAVENOUS ONCE
Status: COMPLETED | OUTPATIENT
Start: 2021-08-10 | End: 2021-08-10

## 2021-08-10 RX ORDER — FENTANYL CITRATE 50 UG/ML
50 INJECTION, SOLUTION INTRAMUSCULAR; INTRAVENOUS ONCE
Status: COMPLETED | OUTPATIENT
Start: 2021-08-10 | End: 2021-08-10

## 2021-08-10 RX ADMIN — DICYCLOMINE HYDROCHLORIDE 20 MG: 10 INJECTION INTRAMUSCULAR at 17:50

## 2021-08-10 RX ADMIN — SODIUM CHLORIDE 1000 ML: 9 INJECTION, SOLUTION INTRAVENOUS at 15:44

## 2021-08-10 RX ADMIN — KETOROLAC TROMETHAMINE 30 MG: 30 INJECTION, SOLUTION INTRAMUSCULAR; INTRAVENOUS at 15:45

## 2021-08-10 RX ADMIN — FENTANYL CITRATE 50 MCG: 50 INJECTION, SOLUTION INTRAMUSCULAR; INTRAVENOUS at 16:50

## 2021-08-10 RX ADMIN — METRONIDAZOLE 500 MG: 500 TABLET ORAL at 17:54

## 2021-08-10 RX ADMIN — CEFDINIR 300 MG: 300 CAPSULE ORAL at 17:54

## 2021-08-10 RX ADMIN — IOPAMIDOL 75 ML: 755 INJECTION, SOLUTION INTRAVENOUS at 17:09

## 2021-08-10 RX ADMIN — ONDANSETRON 4 MG: 2 INJECTION INTRAMUSCULAR; INTRAVENOUS at 15:44

## 2021-08-10 ASSESSMENT — ENCOUNTER SYMPTOMS
NAUSEA: 0
SHORTNESS OF BREATH: 0
VOMITING: 0
EYE REDNESS: 0
ABDOMINAL PAIN: 1
BLOOD IN STOOL: 1

## 2021-08-10 ASSESSMENT — PAIN SCALES - GENERAL
PAINLEVEL_OUTOF10: 10

## 2021-08-10 NOTE — ED PROVIDER NOTES
hernia, Osteoarthritis, Osteopenia, and Seizure ([facility] [address] Past Surgical History:  has a past surgical history that includes Hysterectomy; Endometrial ablation; Knee arthroscopy (Left); fracture surgery; ECHO Compl W Dop Color Flow (3/21/2012); Cholecystectomy; back surgery; Breast surgery; Nerve Block; Appendectomy; Colonoscopy; Endoscopy, colon, diagnostic; pr arthrodesis ant interbody inc discectomy, cervical below c2 (N/A, 12/3/2018); Neck surgery (12/2018); and hiatal hernia repair (N/A, 7/28/2020). Social History:  reports that she quit smoking about 8 years ago. She quit after 20.00 years of use. She has never used smokeless tobacco. She reports that she does not drink alcohol and does not use drugs. Family History: family history is not on file. The patients home medications have been reviewed. Allergies: Patient has no known allergies. ---------------------------------------------------PHYSICAL EXAM--------------------------------------        Constitutional/General: Alert and oriented x3, well appearing, non toxic in NAD  Head: Normocephalic and atraumatic  Mouth: Oropharynx clear, handling secretions, no trismus  Neck: Supple, full ROM,  Pulmonary: Lungs clear to auscultation bilaterally, no wheezes, rales, or rhonchi. Not in respiratory distress  Cardiovascular:  Regular rate. Regular rhythm. No murmurs  Chest: no chest wall tenderness  Abdomen: Soft. Suprapubic abdominal tenderness, no rebound, rigidity or guarding  Musculoskeletal: Moves all extremities x 4. Warm and well perfused, no clubbing, cyanosis, or edema. Capillary refill <3 seconds  Skin: warm and dry. No rashes. Neurologic: GCS 15, no gross focal neurologic deficits  Psych: Normal Affect    -------------------------------------------------- RESULTS -------------------------------------------------  I have personally reviewed all laboratory and imaging results for this patient. Results are listed below.      LABS:  Results for orders placed or performed during the hospital encounter of 08/10/21   CBC Auto Differential   Result Value Ref Range    WBC 7.2 4.5 - 11.5 E9/L    RBC 4.52 3.50 - 5.50 E12/L    Hemoglobin 13.6 11.5 - 15.5 g/dL    Hematocrit 38.5 34.0 - 48.0 %    MCV 85.2 80.0 - 99.9 fL    MCH 30.1 26.0 - 35.0 pg    MCHC 35.3 (H) 32.0 - 34.5 %    RDW 15.9 (H) 11.5 - 15.0 fL    Platelets 479 561 - 476 E9/L    MPV 11.1 7.0 - 12.0 fL    Neutrophils % 57.7 43.0 - 80.0 %    Immature Granulocytes % 0.4 0.0 - 5.0 %    Lymphocytes % 33.7 20.0 - 42.0 %    Monocytes % 7.0 2.0 - 12.0 %    Eosinophils % 0.8 0.0 - 6.0 %    Basophils % 0.4 0.0 - 2.0 %    Neutrophils Absolute 4.13 1.80 - 7.30 E9/L    Immature Granulocytes # 0.03 E9/L    Lymphocytes Absolute 2.41 1.50 - 4.00 E9/L    Monocytes Absolute 0.50 0.10 - 0.95 E9/L    Eosinophils Absolute 0.06 0.05 - 0.50 E9/L    Basophils Absolute 0.03 0.00 - 0.20 E9/L   Comprehensive Metabolic Panel w/ Reflex to MG   Result Value Ref Range    Sodium 142 132 - 146 mmol/L    Potassium reflex Magnesium 4.0 3.5 - 5.0 mmol/L    Chloride 108 (H) 98 - 107 mmol/L    CO2 20 (L) 22 - 29 mmol/L    Anion Gap 14 7 - 16 mmol/L    Glucose 104 (H) 74 - 99 mg/dL    BUN 18 6 - 20 mg/dL    CREATININE 0.7 0.5 - 1.0 mg/dL    GFR Non-African American >60 >=60 mL/min/1.73    GFR African American >60     Calcium 9.7 8.6 - 10.2 mg/dL    Total Protein 7.4 6.4 - 8.3 g/dL    Albumin 4.3 3.5 - 5.2 g/dL    Total Bilirubin 0.3 0.0 - 1.2 mg/dL    Alkaline Phosphatase 69 35 - 104 U/L    ALT 5 0 - 32 U/L    AST 17 0 - 31 U/L   Lipase   Result Value Ref Range    Lipase 17 13 - 60 U/L   Urinalysis, reflex to microscopic   Result Value Ref Range    Color, UA Yellow Straw/Yellow    Clarity, UA Clear Clear    Glucose, Ur Negative Negative mg/dL    Bilirubin Urine SMALL (A) Negative    Ketones, Urine 15 (A) Negative mg/dL    Specific Gravity, UA >=1.030 1.005 - 1.030    Blood, Urine Negative Negative    pH, UA 5.0 5.0 - 9.0    Protein, UA Negative Negative mg/dL    Urobilinogen, Urine 0.2 <2.0 E.U./dL    Nitrite, Urine Negative Negative    Leukocyte Esterase, Urine Negative Negative   Lactic Acid, Plasma   Result Value Ref Range    Lactic Acid 1.3 0.5 - 2.2 mmol/L       RADIOLOGY:  Interpreted by Radiologist.  CT ABDOMEN PELVIS W IV CONTRAST Additional Contrast? None   Final Result   Minimal sigmoid diverticulosis. Minimal new mural thickening in mid sigmoid   colon may reflect low-grade diverticulitis without evidence of adjacent   colonic findings      Intact postoperative osseous hardware, unchanged. Distal esophageal wall thickening likely related to hiatal hernia repair and   history of GERD      Status post cholecystectomy and hysterectomy                 ------------------------- NURSING NOTES AND VITALS REVIEWED ---------------------------   The nursing notes within the ED encounter and vital signs as below have been reviewed by myself. BP (!) 111/56   Pulse 68   Temp 97.5 °F (36.4 °C) (Infrared)   Resp 16   LMP 08/21/2011   SpO2 97%   Oxygen Saturation Interpretation: Normal    The patients available past medical records and past encounters were reviewed.         ------------------------------ ED COURSE/MEDICAL DECISION MAKING----------------------  Medications   0.9 % sodium chloride bolus (1,000 mLs Intravenous New Bag 8/10/21 1544)   ketorolac (TORADOL) injection 30 mg (30 mg Intravenous Given 8/10/21 1545)   ondansetron (ZOFRAN) injection 4 mg (4 mg Intravenous Given 8/10/21 1544)   iopamidol (ISOVUE-370) 76 % injection 75 mL (75 mLs Intravenous Given 8/10/21 1709)   fentaNYL (SUBLIMAZE) injection 50 mcg (50 mcg Intravenous Given 8/10/21 1650)   dicyclomine (BENTYL) injection 20 mg (20 mg Intramuscular Given 8/10/21 1750)   cefdinir (OMNICEF) capsule 300 mg (300 mg Oral Given 8/10/21 1754)   metroNIDAZOLE (FLAGYL) tablet 500 mg (500 mg Oral Given 8/10/21 4088)             Medical Decision Making:   I, Dr. Serjio Menchaca am the primary physician of record. Cierra Tamayo is a 45 y.o. female who presents to the ED for dental pain. The patient did have labs and imaging which were reviewed. The patient was found to have diverticulitis. Patient clinically stable for discharge home. She will follow-up outpatient. Re-Evaluations/Consultations:             ED Course as of Aug 10 1810   Tue Aug 10, 2021   7050 Patient is an acute distress. Results discussed. Patient be discharged. [MT]      ED Course User Index  [MT] Sj James DO               This patient's ED course included: History, physical examination, reevaluation prior to disposition, labs, vision, IV medication    This patient has remained hemodynamically stable during their ED course. Counseling: The emergency provider has spoken with the patient and discussed todays results, in addition to providing specific details for the plan of care and counseling regarding the diagnosis and prognosis. Questions are answered at this time and they are agreeable with the plan.       --------------------------------- IMPRESSION AND DISPOSITION ---------------------------------    IMPRESSION  1. Lower abdominal pain    2. Diverticulitis of colon        DISPOSITION  Disposition: Discharge to home  Patient condition is stable        NOTE: This report was transcribed using voice recognition software.  Every effort was made to ensure accuracy; however, inadvertent computerized transcription errors may be present         Sj James DO  08/10/21 1810

## 2022-06-06 ENCOUNTER — APPOINTMENT (OUTPATIENT)
Dept: CT IMAGING | Age: 52
End: 2022-06-06
Payer: MEDICARE

## 2022-06-06 ENCOUNTER — APPOINTMENT (OUTPATIENT)
Dept: GENERAL RADIOLOGY | Age: 52
End: 2022-06-06
Payer: MEDICARE

## 2022-06-06 ENCOUNTER — HOSPITAL ENCOUNTER (EMERGENCY)
Age: 52
Discharge: HOME | End: 2022-06-06
Payer: MEDICARE

## 2022-06-06 VITALS
SYSTOLIC BLOOD PRESSURE: 128 MMHG | OXYGEN SATURATION: 98 % | HEART RATE: 81 BPM | WEIGHT: 190 LBS | HEIGHT: 64 IN | RESPIRATION RATE: 18 BRPM | DIASTOLIC BLOOD PRESSURE: 71 MMHG | BODY MASS INDEX: 32.44 KG/M2 | TEMPERATURE: 98.4 F

## 2022-06-06 DIAGNOSIS — G89.29 ACUTE EXACERBATION OF CHRONIC LOW BACK PAIN: Primary | ICD-10-CM

## 2022-06-06 DIAGNOSIS — M54.50 ACUTE EXACERBATION OF CHRONIC LOW BACK PAIN: Primary | ICD-10-CM

## 2022-06-06 LAB
ALBUMIN SERPL-MCNC: 4.3 G/DL (ref 3.5–5.2)
ALP BLD-CCNC: 99 U/L (ref 35–104)
ALT SERPL-CCNC: 15 U/L (ref 0–32)
ANION GAP SERPL CALCULATED.3IONS-SCNC: 10 MMOL/L (ref 7–16)
AST SERPL-CCNC: 19 U/L (ref 0–31)
BASOPHILS ABSOLUTE: 0.05 E9/L (ref 0–0.2)
BASOPHILS RELATIVE PERCENT: 0.6 % (ref 0–2)
BILIRUB SERPL-MCNC: 0.3 MG/DL (ref 0–1.2)
BILIRUBIN URINE: NEGATIVE
BLOOD, URINE: NEGATIVE
BUN BLDV-MCNC: 13 MG/DL (ref 6–20)
CALCIUM SERPL-MCNC: 9.6 MG/DL (ref 8.6–10.2)
CHLORIDE BLD-SCNC: 107 MMOL/L (ref 98–107)
CLARITY: CLEAR
CO2: 22 MMOL/L (ref 22–29)
COLOR: YELLOW
CREAT SERPL-MCNC: 0.7 MG/DL (ref 0.5–1)
EOSINOPHILS ABSOLUTE: 0.16 E9/L (ref 0.05–0.5)
EOSINOPHILS RELATIVE PERCENT: 2 % (ref 0–6)
GFR AFRICAN AMERICAN: >60
GFR NON-AFRICAN AMERICAN: >60 ML/MIN/1.73
GLUCOSE BLD-MCNC: 88 MG/DL (ref 74–99)
GLUCOSE URINE: NEGATIVE MG/DL
HCT VFR BLD CALC: 34.4 % (ref 34–48)
HEMOGLOBIN: 11.7 G/DL (ref 11.5–15.5)
IMMATURE GRANULOCYTES #: 0.07 E9/L
IMMATURE GRANULOCYTES %: 0.9 % (ref 0–5)
KETONES, URINE: ABNORMAL MG/DL
LEUKOCYTE ESTERASE, URINE: NEGATIVE
LYMPHOCYTES ABSOLUTE: 2.41 E9/L (ref 1.5–4)
LYMPHOCYTES RELATIVE PERCENT: 30.1 % (ref 20–42)
MCH RBC QN AUTO: 29 PG (ref 26–35)
MCHC RBC AUTO-ENTMCNC: 34 % (ref 32–34.5)
MCV RBC AUTO: 85.4 FL (ref 80–99.9)
MONOCYTES ABSOLUTE: 0.6 E9/L (ref 0.1–0.95)
MONOCYTES RELATIVE PERCENT: 7.5 % (ref 2–12)
NEUTROPHILS ABSOLUTE: 4.71 E9/L (ref 1.8–7.3)
NEUTROPHILS RELATIVE PERCENT: 58.9 % (ref 43–80)
NITRITE, URINE: NEGATIVE
PDW BLD-RTO: 16.6 FL (ref 11.5–15)
PH UA: 6.5 (ref 5–9)
PLATELET # BLD: 359 E9/L (ref 130–450)
PMV BLD AUTO: 10 FL (ref 7–12)
POTASSIUM SERPL-SCNC: 4.5 MMOL/L (ref 3.5–5)
PROTEIN UA: NEGATIVE MG/DL
RBC # BLD: 4.03 E12/L (ref 3.5–5.5)
SEDIMENTATION RATE, ERYTHROCYTE: 25 MM/HR (ref 0–20)
SODIUM BLD-SCNC: 139 MMOL/L (ref 132–146)
SPECIFIC GRAVITY UA: 1.01 (ref 1–1.03)
TOTAL PROTEIN: 7.5 G/DL (ref 6.4–8.3)
UROBILINOGEN, URINE: 0.2 E.U./DL
WBC # BLD: 8 E9/L (ref 4.5–11.5)

## 2022-06-06 PROCEDURE — 6370000000 HC RX 637 (ALT 250 FOR IP): Performed by: NURSE PRACTITIONER

## 2022-06-06 PROCEDURE — 85651 RBC SED RATE NONAUTOMATED: CPT

## 2022-06-06 PROCEDURE — 96372 THER/PROPH/DIAG INJ SC/IM: CPT

## 2022-06-06 PROCEDURE — 80053 COMPREHEN METABOLIC PANEL: CPT

## 2022-06-06 PROCEDURE — 72131 CT LUMBAR SPINE W/O DYE: CPT

## 2022-06-06 PROCEDURE — 73502 X-RAY EXAM HIP UNI 2-3 VIEWS: CPT

## 2022-06-06 PROCEDURE — 36415 COLL VENOUS BLD VENIPUNCTURE: CPT

## 2022-06-06 PROCEDURE — 81003 URINALYSIS AUTO W/O SCOPE: CPT

## 2022-06-06 PROCEDURE — 99284 EMERGENCY DEPT VISIT MOD MDM: CPT

## 2022-06-06 PROCEDURE — 96374 THER/PROPH/DIAG INJ IV PUSH: CPT

## 2022-06-06 PROCEDURE — 6360000002 HC RX W HCPCS: Performed by: NURSE PRACTITIONER

## 2022-06-06 PROCEDURE — 85025 COMPLETE CBC W/AUTO DIFF WBC: CPT

## 2022-06-06 RX ORDER — ORPHENADRINE CITRATE 30 MG/ML
60 INJECTION INTRAMUSCULAR; INTRAVENOUS ONCE
Status: COMPLETED | OUTPATIENT
Start: 2022-06-06 | End: 2022-06-06

## 2022-06-06 RX ORDER — KETOROLAC TROMETHAMINE 30 MG/ML
30 INJECTION, SOLUTION INTRAMUSCULAR; INTRAVENOUS ONCE
Status: COMPLETED | OUTPATIENT
Start: 2022-06-06 | End: 2022-06-06

## 2022-06-06 RX ORDER — METHYLPREDNISOLONE 4 MG/1
TABLET ORAL
Qty: 1 KIT | Refills: 0 | Status: SHIPPED | OUTPATIENT
Start: 2022-06-06 | End: 2022-06-12

## 2022-06-06 RX ORDER — HYDROCODONE BITARTRATE AND ACETAMINOPHEN 5; 325 MG/1; MG/1
1 TABLET ORAL ONCE
Status: COMPLETED | OUTPATIENT
Start: 2022-06-06 | End: 2022-06-06

## 2022-06-06 RX ORDER — PREDNISONE 20 MG/1
40 TABLET ORAL ONCE
Status: COMPLETED | OUTPATIENT
Start: 2022-06-06 | End: 2022-06-06

## 2022-06-06 RX ORDER — CYCLOBENZAPRINE HCL 5 MG
5 TABLET ORAL 2 TIMES DAILY PRN
Qty: 10 TABLET | Refills: 0 | Status: SHIPPED | OUTPATIENT
Start: 2022-06-06 | End: 2022-06-16

## 2022-06-06 RX ORDER — NAPROXEN 500 MG/1
500 TABLET ORAL 2 TIMES DAILY WITH MEALS
Qty: 20 TABLET | Refills: 0 | Status: SHIPPED | OUTPATIENT
Start: 2022-06-06

## 2022-06-06 RX ADMIN — PREDNISONE 40 MG: 20 TABLET ORAL at 12:57

## 2022-06-06 RX ADMIN — HYDROCODONE BITARTRATE AND ACETAMINOPHEN 1 TABLET: 5; 325 TABLET ORAL at 14:43

## 2022-06-06 RX ADMIN — KETOROLAC TROMETHAMINE 30 MG: 30 INJECTION, SOLUTION INTRAMUSCULAR at 13:02

## 2022-06-06 RX ADMIN — ORPHENADRINE CITRATE 60 MG: 30 INJECTION INTRAMUSCULAR; INTRAVENOUS at 12:57

## 2022-06-06 ASSESSMENT — PAIN DESCRIPTION - LOCATION
LOCATION: BACK;LEG
LOCATION: BACK
LOCATION: BACK

## 2022-06-06 ASSESSMENT — PAIN DESCRIPTION - DESCRIPTORS
DESCRIPTORS: BURNING;SHARP
DESCRIPTORS: SHARP
DESCRIPTORS: SHARP

## 2022-06-06 ASSESSMENT — PAIN DESCRIPTION - ORIENTATION
ORIENTATION: LEFT

## 2022-06-06 ASSESSMENT — PAIN SCALES - GENERAL
PAINLEVEL_OUTOF10: 10
PAINLEVEL_OUTOF10: 10
PAINLEVEL_OUTOF10: 8
PAINLEVEL_OUTOF10: 10

## 2022-06-06 ASSESSMENT — PAIN - FUNCTIONAL ASSESSMENT
PAIN_FUNCTIONAL_ASSESSMENT: ACTIVITIES ARE NOT PREVENTED
PAIN_FUNCTIONAL_ASSESSMENT: ACTIVITIES ARE NOT PREVENTED
PAIN_FUNCTIONAL_ASSESSMENT: 0-10

## 2022-06-06 ASSESSMENT — PAIN DESCRIPTION - PAIN TYPE
TYPE: ACUTE PAIN
TYPE: ACUTE PAIN

## 2022-06-06 ASSESSMENT — PAIN DESCRIPTION - FREQUENCY
FREQUENCY: CONTINUOUS
FREQUENCY: CONTINUOUS

## 2022-06-06 NOTE — Clinical Note
Blanca Gilbert was seen and treated in our emergency department on 6/6/2022. She may return to work on 06/08/2022. If you have any questions or concerns, please don't hesitate to call.       YUMIKO Owen - CNP

## 2022-06-08 NOTE — ED PROVIDER NOTES
[address]  Department of Emergency Medicine   ED  Encounter Note  Admit Date/RoomTime: 2022 11:15 AM  ED Room:     NAME: Lucrecia Garcia  : 1970  MRN: [id number]     Chief Complaint:  Back Pain (Pt with h/o sciatic pain with back pain for 2 days that goes down left leg)    History of Present Illness        Lucrecia Garcia is a 47 y.o. old female with a prior history of recurrent intermittent self limited episodes of low back pain, chronic back pain and prior spinal fusion of lumbar spine, presents to the emergency department by private vehicle, for non-traumatic acute-on-chronic, aching left greater than right lower lumbar spine pain without radiation, for several day(s) prior to arrival.  There has been no recent injury as it relates to today's visit. Since onset the symptoms have been waxing and waning and is mild in severity. She has associated signs & symptoms of nothing additional.   She denies any bladder or bowel incontinence , new weakness, tingling or paresthesias, recent back injection, recent spinal surgery or recent spinal/chiropractic manipulation. The pain is aggraveated by nothing in particular and relieved by nothing despite medication use and rest.  She is not currently enrolled in an pain management program.    .  ROS   Pertinent positives and negatives are stated within HPI, all other systems reviewed and are negative. Past Medical History:  has a past medical history of Arthritis, Bleeding ulcer, Depression, DJD (degenerative joint disease), Fibromyalgia, GERD (gastroesophageal reflux disease), Hiatal hernia, Osteoarthritis, Osteopenia, and Seizure ([address] Surgical History:  has a past surgical history that includes Hysterectomy; Endometrial ablation; Knee arthroscopy (Left); fracture surgery; ECHO Compl W Dop Color Flow (3/21/2012); Cholecystectomy; back surgery; Breast surgery; Nerve Block;  Appendectomy; Colonoscopy; Endoscopy, colon, diagnostic; pr arthrodesis ant interbody inc discectomy, cervical below c2 (N/A, 12/3/2018); Neck surgery (12/2018); and hiatal hernia repair (N/A, 7/28/2020). Social History:  reports that she quit smoking about 9 years ago. She quit after 20.00 years of use. She has never used smokeless tobacco. She reports that she does not drink alcohol and does not use drugs. Family History: family history is not on file. Allergies: Patient has no known allergies. Physical Exam   Oxygen Saturation Interpretation: Normal.        ED Triage Vitals   BP Temp Temp Source Heart Rate Resp SpO2 Height Weight   06/06/22 1105 06/06/22 1105 06/06/22 1105 06/06/22 1105 06/06/22 1105 06/06/22 1105 06/06/22 1116 06/06/22 1116   130/80 97.6 °F (36.4 °C) Temporal 85 16 98 % 5' 4\" (1.626 m) 190 lb (86.2 kg)         Constitutional:  Alert, development consistent with age. HEENT:  NC/NT. Airway patent. Neck:  Normal ROM. Supple. Respiratory:  Clear to auscultation and breath sounds equal.  CV:  Regular rate and rhythm, normal heart sounds, without pathological murmurs, ectopy, gallops, or rubs. GI:  Abdomen Soft, nontender, good bowel sounds. No firm or pulsatile mass. Back: lower lumbar spine left greater than right. Tenderness: Moderate. Swelling: no.              Range of Motion: full range with pain. CVA Tenderness: No CVA tenderness. Straight leg raising:  Bilateral negative. Skin:  tenderness. Distal Function:              Motor deficit: none. Sensory deficit: none. Pulse deficit: none. Calf Tenderness:  No Bilateral.               Edema:  none Both lower extremity(s). Deep Tendon Reflexes (DTR's) to bilateral knee's and ankle's are normo-reflexive   Gait:  Normal.  Hip:   Left: hip. Tenderness:  mild. Swelling: None. Deformity: no.       ROM: full range with pain.     Distal Function:        Motor deficit: none. Sensory deficit: none. Pulse deficit: none. Capillary refill: normal.  Extremity Skin:  no erythema, rash or swelling noted. Calf Tenderness:  No Bilateral.          Edema:  none Both lower extremity(s). Pelvis:  Bilateral.        Tenderness: none. Stability:  stable to palpation. Integument:  Normal turgor. Warm, dry, without visible rash. Lymphatics: No lymphangitis or adenopathy noted. Neurological:  Oriented. Motor functions intact.     Lab / Imaging Results   (All laboratory and radiology results have been personally reviewed by myself)  Labs:  Results for orders placed or performed during the hospital encounter of 06/06/22   CBC with Auto Differential   Result Value Ref Range    WBC 8.0 4.5 - 11.5 E9/L    RBC 4.03 3.50 - 5.50 E12/L    Hemoglobin 11.7 11.5 - 15.5 g/dL    Hematocrit 34.4 34.0 - 48.0 %    MCV 85.4 80.0 - 99.9 fL    MCH 29.0 26.0 - 35.0 pg    MCHC 34.0 32.0 - 34.5 %    RDW 16.6 (H) 11.5 - 15.0 fL    Platelets 739 872 - 777 E9/L    MPV 10.0 7.0 - 12.0 fL    Neutrophils % 58.9 43.0 - 80.0 %    Immature Granulocytes % 0.9 0.0 - 5.0 %    Lymphocytes % 30.1 20.0 - 42.0 %    Monocytes % 7.5 2.0 - 12.0 %    Eosinophils % 2.0 0.0 - 6.0 %    Basophils % 0.6 0.0 - 2.0 %    Neutrophils Absolute 4.71 1.80 - 7.30 E9/L    Immature Granulocytes # 0.07 E9/L    Lymphocytes Absolute 2.41 1.50 - 4.00 E9/L    Monocytes Absolute 0.60 0.10 - 0.95 E9/L    Eosinophils Absolute 0.16 0.05 - 0.50 E9/L    Basophils Absolute 0.05 0.00 - 0.20 E9/L   Comprehensive Metabolic Panel   Result Value Ref Range    Sodium 139 132 - 146 mmol/L    Potassium 4.5 3.5 - 5.0 mmol/L    Chloride 107 98 - 107 mmol/L    CO2 22 22 - 29 mmol/L    Anion Gap 10 7 - 16 mmol/L    Glucose 88 74 - 99 mg/dL    BUN 13 6 - 20 mg/dL    CREATININE 0.7 0.5 - 1.0 mg/dL    GFR Non-African American >60 >=60 mL/min/1.73    GFR African American >60     Calcium 9.6 8.6 - 10.2 mg/dL Total Protein 7.5 6.4 - 8.3 g/dL    Albumin 4.3 3.5 - 5.2 g/dL    Total Bilirubin 0.3 0.0 - 1.2 mg/dL    Alkaline Phosphatase 99 35 - 104 U/L    ALT 15 0 - 32 U/L    AST 19 0 - 31 U/L   Urinalysis   Result Value Ref Range    Color, UA Yellow Straw/Yellow    Clarity, UA Clear Clear    Glucose, Ur Negative Negative mg/dL    Bilirubin Urine Negative Negative    Ketones, Urine TRACE (A) Negative mg/dL    Specific Gravity, UA 1.015 1.005 - 1.030    Blood, Urine Negative Negative    pH, UA 6.5 5.0 - 9.0    Protein, UA Negative Negative mg/dL    Urobilinogen, Urine 0.2 <2.0 E.U./dL    Nitrite, Urine Negative Negative    Leukocyte Esterase, Urine Negative Negative   Sedimentation Rate   Result Value Ref Range    Sed Rate 25 (H) 0 - 20 mm/Hr       Imaging: All Radiology results interpreted by Radiologist unless otherwise noted. CT LUMBAR SPINE WO CONTRAST   Final Result   Minimal degenerative changes seen of the thoracolumbar spine. Posterior   fusion of L5 and S1 with no fracture or dislocation. XR HIP 2-3 VW W PELVIS LEFT   Final Result   No acute abnormality of the hip. Postsurgical changes as noted above. ED Course / Medical Decision Making     Medications   ketorolac (TORADOL) injection 30 mg (30 mg IntraVENous Given 6/6/22 1302)   predniSONE (DELTASONE) tablet 40 mg (40 mg Oral Given 6/6/22 1257)   orphenadrine (NORFLEX) injection 60 mg (60 mg IntraMUSCular Given 6/6/22 1257)   HYDROcodone-acetaminophen (NORCO) 5-325 MG per tablet 1 tablet (1 tablet Oral Given 6/6/22 1443)        Re-examination:  6/8/22       Time: 0970   Patients condition is improving after treatment. States that she is feeling much better after pain medication. She states that she is now easily ambulatory she denies any loss of bowel or bladder function she denies any weakness to the bilateral lower extremities. Patient denies any abdominal pain at this time.     Consult(s):   None    Procedure(s):   none    Medical Decision Making/Counseling:    *At this time the patient is without objective evidence of an acute process requiring inpatient management. The emergency provider has spoken with the patient and discussed todays emergency visit, in addition to providing specific details for the plan of care and counseling regarding the diagnosis and prognosis. She was counseled on the role of the emergency department regarding prescribing medications for chronic conditions including Narcotic and other controlled substances. Based on the presenting complaint and nature of illness, the requested medications will not be provided today in prescription form and she is instructed to contact their provider for supplemental medications as soon as possible. Questions are answered at this time and they are agreeable with the plan. Patient case was discussed extensively with Dr. Samantha Sierra ED attending. Patient at this time did have sed rate drawn for her chronic low back pain which was 25 she has no white count and no evidence for cauda equina or acute epidural abscess at this time. Patient is stable for close outpatient follow-up she sees Dr. María Elena Tejeda and saw orthopedics. Patient to follow-up in the outpatient with her back specialist.  Patient verbalized understanding she is agreeable with the plan of care all questions were answered. Assessment      1. Acute exacerbation of chronic low back pain      Plan   Discharged home. Patient condition is good    New Medications     Discharge Medication List as of 6/6/2022  3:18 PM      START taking these medications    Details   methylPREDNISolone (MEDROL, TRISHA,) 4 MG tablet Take by mouth., Disp-1 kit, R-0Normal      cyclobenzaprine (FLEXERIL) 5 MG tablet Take 1 tablet by mouth 2 times daily as needed for Muscle spasms, Disp-10 tablet, R-0Normal           Electronically signed by YUMIKO Hills CNP   DD: 6/8/22  **This report was transcribed using voice recognition software.  Every effort was made to ensure accuracy; however, inadvertent computerized transcription errors may be present.   END OF ED PROVIDER NOTE     YUMIKO Esteban - CNP  06/08/22 3913

## 2022-06-25 ENCOUNTER — HOSPITAL ENCOUNTER (EMERGENCY)
Age: 52
Discharge: HOME | End: 2022-06-25
Attending: STUDENT IN AN ORGANIZED HEALTH CARE EDUCATION/TRAINING PROGRAM
Payer: MEDICARE

## 2022-06-25 ENCOUNTER — APPOINTMENT (OUTPATIENT)
Dept: GENERAL RADIOLOGY | Age: 52
End: 2022-06-25
Payer: MEDICARE

## 2022-06-25 VITALS
DIASTOLIC BLOOD PRESSURE: 78 MMHG | RESPIRATION RATE: 16 BRPM | HEART RATE: 74 BPM | TEMPERATURE: 97.9 F | BODY MASS INDEX: 32.44 KG/M2 | WEIGHT: 190 LBS | OXYGEN SATURATION: 97 % | SYSTOLIC BLOOD PRESSURE: 122 MMHG | HEIGHT: 64 IN

## 2022-06-25 DIAGNOSIS — R19.7 NAUSEA VOMITING AND DIARRHEA: ICD-10-CM

## 2022-06-25 DIAGNOSIS — R51.9 ACUTE NONINTRACTABLE HEADACHE, UNSPECIFIED HEADACHE TYPE: Primary | ICD-10-CM

## 2022-06-25 DIAGNOSIS — R11.2 NAUSEA VOMITING AND DIARRHEA: ICD-10-CM

## 2022-06-25 LAB
ALBUMIN SERPL-MCNC: 4.4 G/DL (ref 3.5–5.2)
ALP BLD-CCNC: 89 U/L (ref 35–104)
ALT SERPL-CCNC: 10 U/L (ref 0–32)
ANION GAP SERPL CALCULATED.3IONS-SCNC: 13 MMOL/L (ref 7–16)
AST SERPL-CCNC: 15 U/L (ref 0–31)
BASOPHILS ABSOLUTE: 0.03 E9/L (ref 0–0.2)
BASOPHILS RELATIVE PERCENT: 0.4 % (ref 0–2)
BILIRUB SERPL-MCNC: 0.2 MG/DL (ref 0–1.2)
BUN BLDV-MCNC: 14 MG/DL (ref 6–20)
CALCIUM SERPL-MCNC: 9.3 MG/DL (ref 8.6–10.2)
CHLORIDE BLD-SCNC: 111 MMOL/L (ref 98–107)
CO2: 17 MMOL/L (ref 22–29)
CREAT SERPL-MCNC: 0.9 MG/DL (ref 0.5–1)
EOSINOPHILS ABSOLUTE: 0.16 E9/L (ref 0.05–0.5)
EOSINOPHILS RELATIVE PERCENT: 2.3 % (ref 0–6)
GFR AFRICAN AMERICAN: >60
GFR NON-AFRICAN AMERICAN: >60 ML/MIN/1.73
GLUCOSE BLD-MCNC: 98 MG/DL (ref 74–99)
HCT VFR BLD CALC: 36.5 % (ref 34–48)
HEMOGLOBIN: 12.5 G/DL (ref 11.5–15.5)
IMMATURE GRANULOCYTES #: 0.03 E9/L
IMMATURE GRANULOCYTES %: 0.4 % (ref 0–5)
INFLUENZA A: NOT DETECTED
INFLUENZA B: NOT DETECTED
LYMPHOCYTES ABSOLUTE: 2.42 E9/L (ref 1.5–4)
LYMPHOCYTES RELATIVE PERCENT: 35.5 % (ref 20–42)
MCH RBC QN AUTO: 29.1 PG (ref 26–35)
MCHC RBC AUTO-ENTMCNC: 34.2 % (ref 32–34.5)
MCV RBC AUTO: 85.1 FL (ref 80–99.9)
MONOCYTES ABSOLUTE: 0.67 E9/L (ref 0.1–0.95)
MONOCYTES RELATIVE PERCENT: 9.8 % (ref 2–12)
NEUTROPHILS ABSOLUTE: 3.5 E9/L (ref 1.8–7.3)
NEUTROPHILS RELATIVE PERCENT: 51.6 % (ref 43–80)
PDW BLD-RTO: 16.6 FL (ref 11.5–15)
PLATELET # BLD: 310 E9/L (ref 130–450)
PMV BLD AUTO: 10.2 FL (ref 7–12)
POTASSIUM REFLEX MAGNESIUM: 4 MMOL/L (ref 3.5–5)
RBC # BLD: 4.29 E12/L (ref 3.5–5.5)
SARS-COV-2 RNA, RT PCR: NOT DETECTED
SODIUM BLD-SCNC: 141 MMOL/L (ref 132–146)
TOTAL PROTEIN: 7.8 G/DL (ref 6.4–8.3)
WBC # BLD: 6.8 E9/L (ref 4.5–11.5)

## 2022-06-25 PROCEDURE — 96375 TX/PRO/DX INJ NEW DRUG ADDON: CPT

## 2022-06-25 PROCEDURE — 85025 COMPLETE CBC W/AUTO DIFF WBC: CPT

## 2022-06-25 PROCEDURE — 6360000002 HC RX W HCPCS: Performed by: STUDENT IN AN ORGANIZED HEALTH CARE EDUCATION/TRAINING PROGRAM

## 2022-06-25 PROCEDURE — 71045 X-RAY EXAM CHEST 1 VIEW: CPT

## 2022-06-25 PROCEDURE — 80053 COMPREHEN METABOLIC PANEL: CPT

## 2022-06-25 PROCEDURE — 99284 EMERGENCY DEPT VISIT MOD MDM: CPT

## 2022-06-25 PROCEDURE — 96365 THER/PROPH/DIAG IV INF INIT: CPT

## 2022-06-25 PROCEDURE — 6370000000 HC RX 637 (ALT 250 FOR IP): Performed by: STUDENT IN AN ORGANIZED HEALTH CARE EDUCATION/TRAINING PROGRAM

## 2022-06-25 PROCEDURE — 36415 COLL VENOUS BLD VENIPUNCTURE: CPT

## 2022-06-25 PROCEDURE — 2580000003 HC RX 258: Performed by: STUDENT IN AN ORGANIZED HEALTH CARE EDUCATION/TRAINING PROGRAM

## 2022-06-25 PROCEDURE — 87636 SARSCOV2 & INF A&B AMP PRB: CPT

## 2022-06-25 RX ORDER — 0.9 % SODIUM CHLORIDE 0.9 %
1000 INTRAVENOUS SOLUTION INTRAVENOUS ONCE
Status: COMPLETED | OUTPATIENT
Start: 2022-06-25 | End: 2022-06-25

## 2022-06-25 RX ORDER — ACETAMINOPHEN 325 MG/1
650 TABLET ORAL ONCE
Status: COMPLETED | OUTPATIENT
Start: 2022-06-25 | End: 2022-06-25

## 2022-06-25 RX ORDER — ONDANSETRON 4 MG/1
4 TABLET, ORALLY DISINTEGRATING ORAL EVERY 8 HOURS PRN
Qty: 10 TABLET | Refills: 0 | Status: SHIPPED | OUTPATIENT
Start: 2022-06-25

## 2022-06-25 RX ORDER — MAGNESIUM SULFATE IN WATER 40 MG/ML
2000 INJECTION, SOLUTION INTRAVENOUS ONCE
Status: COMPLETED | OUTPATIENT
Start: 2022-06-25 | End: 2022-06-25

## 2022-06-25 RX ORDER — KETOROLAC TROMETHAMINE 30 MG/ML
15 INJECTION, SOLUTION INTRAMUSCULAR; INTRAVENOUS ONCE
Status: COMPLETED | OUTPATIENT
Start: 2022-06-25 | End: 2022-06-25

## 2022-06-25 RX ORDER — DIPHENHYDRAMINE HYDROCHLORIDE 50 MG/ML
25 INJECTION INTRAMUSCULAR; INTRAVENOUS ONCE
Status: COMPLETED | OUTPATIENT
Start: 2022-06-25 | End: 2022-06-25

## 2022-06-25 RX ORDER — PROCHLORPERAZINE EDISYLATE 5 MG/ML
10 INJECTION INTRAMUSCULAR; INTRAVENOUS ONCE
Status: COMPLETED | OUTPATIENT
Start: 2022-06-25 | End: 2022-06-25

## 2022-06-25 RX ADMIN — DIPHENHYDRAMINE HYDROCHLORIDE 25 MG: 50 INJECTION, SOLUTION INTRAMUSCULAR; INTRAVENOUS at 20:37

## 2022-06-25 RX ADMIN — KETOROLAC TROMETHAMINE 15 MG: 30 INJECTION, SOLUTION INTRAMUSCULAR; INTRAVENOUS at 20:36

## 2022-06-25 RX ADMIN — SODIUM CHLORIDE 1000 ML: 9 INJECTION, SOLUTION INTRAVENOUS at 20:34

## 2022-06-25 RX ADMIN — PROCHLORPERAZINE EDISYLATE 10 MG: 5 INJECTION INTRAMUSCULAR; INTRAVENOUS at 20:35

## 2022-06-25 RX ADMIN — MAGNESIUM SULFATE HEPTAHYDRATE 2000 MG: 40 INJECTION, SOLUTION INTRAVENOUS at 21:34

## 2022-06-25 RX ADMIN — ACETAMINOPHEN 650 MG: 325 TABLET, FILM COATED ORAL at 21:32

## 2022-06-25 ASSESSMENT — PAIN SCALES - GENERAL
PAINLEVEL_OUTOF10: 10
PAINLEVEL_OUTOF10: 8
PAINLEVEL_OUTOF10: 10
PAINLEVEL_OUTOF10: 4

## 2022-06-25 ASSESSMENT — PAIN - FUNCTIONAL ASSESSMENT
PAIN_FUNCTIONAL_ASSESSMENT: 0-10
PAIN_FUNCTIONAL_ASSESSMENT: 0-10

## 2022-06-25 NOTE — ED PROVIDER NOTES
Department of Emergency Medicine   ED  Provider Note  Admit Date/RoomTime: 6/25/2022  6:50 PM  ED Room: 09/09          History of Present Illness:  6/25/22, Time: 7:34 PM EDT  Chief Complaint   Patient presents with    Headache     PT received shingles vacc on thursday. Has had headache and N/V since       Tevin Watkins is a 47 y.o. female presenting to the ED for headache. Patient has past medical history of fibromyalgia and migraines. She states that she has had a headache for the past 2 days. It is right-sided and throbbing. Ice and ibuprofen help her symptoms. She last had Excedrin 4 hours ago. She denies any trauma, numbness, tingling or weakness. No vision change. Denies photophobia. She has had migraines in the past which have been similar. She does have associated nausea with dry heaving. She notes that 3 days ago she received her shingles vaccine and is concerned this was the cause. She denies any chest pain, shortness of breath, abdominal pain. She has having diarrhea since yesterday but denies any blood per rectum. No known sick contacts. Patient is not vaccinated for COVID. She also is noting a cough that is productive of white sputum. No dysuria or hematuria. No fevers. Review of Systems:   Constitutional symptoms: Denies fever  Eyes: Denies eye pain  Ears, Nose, Mouth, Throat: Denies ear pain  Cardiovascular: Denies chest pain  Respiratory: Denies shortness of breath. Positive for cough  Gastrointestinal: Denies blood per rectum.   Positive for nausea, vomiting and diarrhea  Genitourinary: Denies hematuria  Skin: Denies rashes  Neurological: Positive for headache  Musculoskeletal: Denies extremity pain    --------------------------------------------- PAST HISTORY ---------------------------------------------  Past Medical History:  has a past medical history of Arthritis, Bleeding ulcer, Depression, DJD (degenerative joint disease), Fibromyalgia, GERD (gastroesophageal reflux disease), Hiatal hernia, Osteoarthritis, Osteopenia, and Seizure ([facility] [address] Past Surgical History:  has a past surgical history that includes Hysterectomy; Endometrial ablation; Knee arthroscopy (Left); fracture surgery; ECHO Compl W Dop Color Flow (3/21/2012); Cholecystectomy; back surgery; Breast surgery; Nerve Block; Appendectomy; Colonoscopy; Endoscopy, colon, diagnostic; pr arthrodesis ant interbody inc discectomy, cervical below c2 (N/A, 12/3/2018); Neck surgery (12/2018); and hiatal hernia repair (N/A, 7/28/2020). Social History:  reports that she quit smoking about 9 years ago. She quit after 20.00 years of use. She has never used smokeless tobacco. She reports that she does not drink alcohol and does not use drugs. Family History: family history is not on file. . Unless otherwise noted, family history is non contributory    The patients home medications have been reviewed. Allergies: Patient has no known allergies. I have reviewed the past medical history, past surgical history, social history, and family history    ---------------------------------------------------PHYSICAL EXAM--------------------------------------    General: The patient is conversational and lying comfortably in bed. Head: Normocephalic and atraumatic. Eyes: Sclera non-icteric and no conjunctival injection  ENT: Nasal and oral membranes appear dry  Neck: Trachea midline. No JVD  Respiratory: Lungs clear to auscultation bilaterally. Patient speaking in full sentences. Cardiovascular: Regular rate. No pedal edema. Gastrointestinal:  Abdomen is soft and nondistended. Bowel sounds present. There is no tenderness. There is no guarding or rebound. Musculoskeletal: No deformity  Skin: Skin warm and dry. No rashes. Neurologic: No gross motor deficits. No aphasia. Pupils are equal and reactive to light. Extraocular eye movements intact. Sensation intact bilaterally. Symmetric facies. Tongue protrudes midline.   5 out of 5 symmetric strength of the upper and lower extremities. Negative finger-to-nose testing. Alert and oriented. No meningismus  Psychiatric: Normal affect.    -------------------------------------------------- RESULTS -------------------------------------------------  I have personally reviewed all laboratory and imaging results for this patient. Results are listed below.      LABS: (Lab results interpreted by me)  Results for orders placed or performed during the hospital encounter of 06/25/22   COVID-19 & Influenza Combo    Specimen: Nasopharyngeal Swab   Result Value Ref Range    SARS-CoV-2 RNA, RT PCR NOT DETECTED NOT DETECTED    INFLUENZA A NOT DETECTED NOT DETECTED    INFLUENZA B NOT DETECTED NOT DETECTED   Comprehensive Metabolic Panel w/ Reflex to MG   Result Value Ref Range    Sodium 141 132 - 146 mmol/L    Potassium reflex Magnesium 4.0 3.5 - 5.0 mmol/L    Chloride 111 (H) 98 - 107 mmol/L    CO2 17 (L) 22 - 29 mmol/L    Anion Gap 13 7 - 16 mmol/L    Glucose 98 74 - 99 mg/dL    BUN 14 6 - 20 mg/dL    CREATININE 0.9 0.5 - 1.0 mg/dL    GFR Non-African American >60 >=60 mL/min/1.73    GFR African American >60     Calcium 9.3 8.6 - 10.2 mg/dL    Total Protein 7.8 6.4 - 8.3 g/dL    Albumin 4.4 3.5 - 5.2 g/dL    Total Bilirubin 0.2 0.0 - 1.2 mg/dL    Alkaline Phosphatase 89 35 - 104 U/L    ALT 10 0 - 32 U/L    AST 15 0 - 31 U/L   CBC with Auto Differential   Result Value Ref Range    WBC 6.8 4.5 - 11.5 E9/L    RBC 4.29 3.50 - 5.50 E12/L    Hemoglobin 12.5 11.5 - 15.5 g/dL    Hematocrit 36.5 34.0 - 48.0 %    MCV 85.1 80.0 - 99.9 fL    MCH 29.1 26.0 - 35.0 pg    MCHC 34.2 32.0 - 34.5 %    RDW 16.6 (H) 11.5 - 15.0 fL    Platelets 652 343 - 035 E9/L    MPV 10.2 7.0 - 12.0 fL    Neutrophils % 51.6 43.0 - 80.0 %    Immature Granulocytes % 0.4 0.0 - 5.0 %    Lymphocytes % 35.5 20.0 - 42.0 %    Monocytes % 9.8 2.0 - 12.0 %    Eosinophils % 2.3 0.0 - 6.0 %    Basophils % 0.4 0.0 - 2.0 %    Neutrophils Absolute 3.50 1.80 - 7.30 E9/L    Immature Granulocytes # 0.03 E9/L    Lymphocytes Absolute 2.42 1.50 - 4.00 E9/L    Monocytes Absolute 0.67 0.10 - 0.95 E9/L    Eosinophils Absolute 0.16 0.05 - 0.50 E9/L    Basophils Absolute 0.03 0.00 - 0.20 E9/L   ,     RADIOLOGY:  Interpreted by Radiologist unless otherwise specified  XR CHEST PORTABLE   Final Result   No acute cardiopulmonary process. ------------------------- NURSING NOTES AND VITALS REVIEWED ---------------------------   The nursing notes within the ED encounter and vital signs as below have been reviewed by myself  /78   Pulse 74   Temp 97.9 °F (36.6 °C) (Infrared)   Resp 16   Ht 5' 4\" (1.626 m)   Wt 190 lb (86.2 kg)   LMP 08/21/2011   SpO2 97%   BMI 32.61 kg/m²     Oxygen Saturation Interpretation: Normal    The patients available past medical records and past encounters were reviewed. ------------------------------ ED COURSE/MEDICAL DECISION MAKING----------------------  Medications   0.9 % sodium chloride bolus (0 mLs IntraVENous Stopped 6/25/22 2216)   ketorolac (TORADOL) injection 15 mg (15 mg IntraVENous Given 6/25/22 2036)   prochlorperazine (COMPAZINE) injection 10 mg (10 mg IntraVENous Given 6/25/22 2035)   diphenhydrAMINE (BENADRYL) injection 25 mg (25 mg IntraVENous Given 6/25/22 2037)   acetaminophen (TYLENOL) tablet 650 mg (650 mg Oral Given 6/25/22 2132)   magnesium sulfate 2000 mg in 50 mL IVPB premix (0 mg IntraVENous Stopped 6/25/22 2204)       Medical Decision Making: This is a 47 y.o. female presenting to the ED for headache. On initial presentation, the patient's vital signs are interpreted as normotensive, afebrile and saturating well on room air. Based on history and physical exam, we have a broad differential.  Patient has known history of migraine headaches and this is likely acute exacerbation of her chronic disease.   As she does feel the shingles vaccine worsened this it is possible she is having a side effect however she has no evidence of meningismus on examination. No history of trauma neurological exam nonfocal.  With her symptoms of nausea vomiting and diarrhea I do also suspect viral syndrome or gastroenteritis. As the patient is not vaccinated for COVID we will assess for this influenza. With her cough we will assess for pneumonia. Abdominal exam is soft and nonsurgical.  Chest x-ray and laboratory studies obtained. We will symptomatically treat the patient with fluids, Toradol, Compazine and Benadryl. Laboratory studies show hyperchloremia and mild acidosis but no anion gap. Electrolytes otherwise normal.  LFTs normal.  No leukocytosis or anemia. COVID influenza negative. Chest x-ray shows no acute process. This is interpreted radiology. On reevaluation, patient endorses improvement of her headache however it is still present. She will be given a dose of Tylenol as well as magnesium intravenously. On re-evaluation, patient's headache is resolved. She expresses a desire to be discharged. Strict return precautions. Stress hydration. Given prescription for Zofran. Can take tylenol and ibuprofen as needed. This patient's ED course included: a personal history and physicial examination and re-evaluation prior to disposition    This patient has improved during their ED course. Consultations:  None    Oxygen Saturation Interpretation: 97 % on room air. Normal    Counseling:   I have spoken with the patient and spouse/SO and discussed todays results, in addition to providing specific details for the plan of care and counseling regarding the diagnosis and prognosis. Questions are answered at this time and they are agreeable with the plan.     --------------------------------- IMPRESSION AND DISPOSITION ---------------------------------    IMPRESSION  1. Acute nonintractable headache, unspecified headache type    2.  Nausea vomiting and diarrhea        DISPOSITION  Disposition: Discharge to home  Patient condition is fair    I, Dr. Stephanie Rizo, parisa the primary provider of record    NOTE: This report was transcribed using voice recognition software.  Every effort was made to ensure accuracy; however, inadvertent computerized transcription errors may be present        Stephanie Rizo MD  06/25/22 [id number]

## 2022-12-13 ENCOUNTER — APPOINTMENT (OUTPATIENT)
Dept: CT IMAGING | Age: 52
End: 2022-12-13
Payer: MEDICARE

## 2022-12-13 ENCOUNTER — HOSPITAL ENCOUNTER (EMERGENCY)
Age: 52
Discharge: LEFT BEFORE DISCHARGE | End: 2022-12-13
Attending: EMERGENCY MEDICINE
Payer: MEDICARE

## 2022-12-13 VITALS
BODY MASS INDEX: 33.34 KG/M2 | OXYGEN SATURATION: 96 % | WEIGHT: 195.31 LBS | HEART RATE: 69 BPM | SYSTOLIC BLOOD PRESSURE: 113 MMHG | RESPIRATION RATE: 18 BRPM | DIASTOLIC BLOOD PRESSURE: 76 MMHG | HEIGHT: 64 IN | TEMPERATURE: 98.2 F

## 2022-12-13 DIAGNOSIS — Z53.21 ELOPED FROM EMERGENCY DEPARTMENT: Primary | ICD-10-CM

## 2022-12-13 DIAGNOSIS — G43.109 MIGRAINE WITH AURA AND WITHOUT STATUS MIGRAINOSUS, NOT INTRACTABLE: ICD-10-CM

## 2022-12-13 LAB
ALBUMIN SERPL-MCNC: 4.5 G/DL (ref 3.5–5.2)
ALP BLD-CCNC: 89 U/L (ref 35–104)
ALT SERPL-CCNC: 15 U/L (ref 0–32)
ANION GAP SERPL CALCULATED.3IONS-SCNC: 13 MMOL/L (ref 7–16)
AST SERPL-CCNC: 14 U/L (ref 0–31)
BASOPHILS ABSOLUTE: 0.04 E9/L (ref 0–0.2)
BASOPHILS RELATIVE PERCENT: 0.4 % (ref 0–2)
BILIRUB SERPL-MCNC: 0.4 MG/DL (ref 0–1.2)
BUN BLDV-MCNC: 19 MG/DL (ref 6–20)
CALCIUM SERPL-MCNC: 9.5 MG/DL (ref 8.6–10.2)
CHLORIDE BLD-SCNC: 104 MMOL/L (ref 98–107)
CHP ED QC CHECK: NORMAL
CO2: 22 MMOL/L (ref 22–29)
CREAT SERPL-MCNC: 0.8 MG/DL (ref 0.5–1)
EKG ATRIAL RATE: 74 BPM
EKG P AXIS: -24 DEGREES
EKG P-R INTERVAL: 140 MS
EKG Q-T INTERVAL: 382 MS
EKG QRS DURATION: 84 MS
EKG QTC CALCULATION (BAZETT): 424 MS
EKG R AXIS: -164 DEGREES
EKG T AXIS: 139 DEGREES
EKG VENTRICULAR RATE: 74 BPM
EOSINOPHILS ABSOLUTE: 0.1 E9/L (ref 0.05–0.5)
EOSINOPHILS RELATIVE PERCENT: 1 % (ref 0–6)
GFR SERPL CREATININE-BSD FRML MDRD: >60 ML/MIN/1.73
GLUCOSE BLD-MCNC: 100 MG/DL (ref 74–99)
GLUCOSE BLD-MCNC: 84 MG/DL
HCT VFR BLD CALC: 38.5 % (ref 34–48)
HEMOGLOBIN: 13.2 G/DL (ref 11.5–15.5)
IMMATURE GRANULOCYTES #: 0.04 E9/L
IMMATURE GRANULOCYTES %: 0.4 % (ref 0–5)
INFLUENZA A: NOT DETECTED
INFLUENZA B: NOT DETECTED
INR BLD: 1.1
LYMPHOCYTES ABSOLUTE: 3.03 E9/L (ref 1.5–4)
LYMPHOCYTES RELATIVE PERCENT: 29.2 % (ref 20–42)
MCH RBC QN AUTO: 28.3 PG (ref 26–35)
MCHC RBC AUTO-ENTMCNC: 34.3 % (ref 32–34.5)
MCV RBC AUTO: 82.4 FL (ref 80–99.9)
METER GLUCOSE: 84 MG/DL (ref 74–99)
MONOCYTES ABSOLUTE: 0.66 E9/L (ref 0.1–0.95)
MONOCYTES RELATIVE PERCENT: 6.4 % (ref 2–12)
NEUTROPHILS ABSOLUTE: 6.49 E9/L (ref 1.8–7.3)
NEUTROPHILS RELATIVE PERCENT: 62.6 % (ref 43–80)
PDW BLD-RTO: 18.3 FL (ref 11.5–15)
PLATELET # BLD: 361 E9/L (ref 130–450)
PMV BLD AUTO: 10.3 FL (ref 7–12)
POTASSIUM REFLEX MAGNESIUM: 3.8 MMOL/L (ref 3.5–5)
PROTHROMBIN TIME: 12.1 SEC (ref 9.3–12.4)
RBC # BLD: 4.67 E12/L (ref 3.5–5.5)
SARS-COV-2 RNA, RT PCR: NOT DETECTED
SODIUM BLD-SCNC: 139 MMOL/L (ref 132–146)
TOTAL PROTEIN: 7.6 G/DL (ref 6.4–8.3)
TROPONIN, HIGH SENSITIVITY: <6 NG/L (ref 0–9)
TROPONIN, HIGH SENSITIVITY: <6 NG/L (ref 0–9)
WBC # BLD: 10.4 E9/L (ref 4.5–11.5)

## 2022-12-13 PROCEDURE — 87636 SARSCOV2 & INF A&B AMP PRB: CPT

## 2022-12-13 PROCEDURE — 96374 THER/PROPH/DIAG INJ IV PUSH: CPT

## 2022-12-13 PROCEDURE — 80053 COMPREHEN METABOLIC PANEL: CPT

## 2022-12-13 PROCEDURE — 84484 ASSAY OF TROPONIN QUANT: CPT

## 2022-12-13 PROCEDURE — 93010 ELECTROCARDIOGRAM REPORT: CPT | Performed by: INTERNAL MEDICINE

## 2022-12-13 PROCEDURE — 93005 ELECTROCARDIOGRAM TRACING: CPT | Performed by: NURSE PRACTITIONER

## 2022-12-13 PROCEDURE — 70496 CT ANGIOGRAPHY HEAD: CPT

## 2022-12-13 PROCEDURE — 36415 COLL VENOUS BLD VENIPUNCTURE: CPT

## 2022-12-13 PROCEDURE — 70498 CT ANGIOGRAPHY NECK: CPT

## 2022-12-13 PROCEDURE — 85025 COMPLETE CBC W/AUTO DIFF WBC: CPT

## 2022-12-13 PROCEDURE — 96375 TX/PRO/DX INJ NEW DRUG ADDON: CPT

## 2022-12-13 PROCEDURE — 2580000003 HC RX 258: Performed by: STUDENT IN AN ORGANIZED HEALTH CARE EDUCATION/TRAINING PROGRAM

## 2022-12-13 PROCEDURE — 6370000000 HC RX 637 (ALT 250 FOR IP): Performed by: STUDENT IN AN ORGANIZED HEALTH CARE EDUCATION/TRAINING PROGRAM

## 2022-12-13 PROCEDURE — 99285 EMERGENCY DEPT VISIT HI MDM: CPT

## 2022-12-13 PROCEDURE — 85610 PROTHROMBIN TIME: CPT

## 2022-12-13 PROCEDURE — 82962 GLUCOSE BLOOD TEST: CPT

## 2022-12-13 PROCEDURE — 6360000002 HC RX W HCPCS: Performed by: STUDENT IN AN ORGANIZED HEALTH CARE EDUCATION/TRAINING PROGRAM

## 2022-12-13 PROCEDURE — 6360000004 HC RX CONTRAST MEDICATION: Performed by: RADIOLOGY

## 2022-12-13 PROCEDURE — 93005 ELECTROCARDIOGRAM TRACING: CPT | Performed by: STUDENT IN AN ORGANIZED HEALTH CARE EDUCATION/TRAINING PROGRAM

## 2022-12-13 RX ORDER — MAGNESIUM SULFATE IN WATER 40 MG/ML
2000 INJECTION, SOLUTION INTRAVENOUS ONCE
Status: DISCONTINUED | OUTPATIENT
Start: 2022-12-13 | End: 2022-12-14 | Stop reason: HOSPADM

## 2022-12-13 RX ORDER — PROCHLORPERAZINE EDISYLATE 5 MG/ML
10 INJECTION INTRAMUSCULAR; INTRAVENOUS ONCE
Status: COMPLETED | OUTPATIENT
Start: 2022-12-13 | End: 2022-12-13

## 2022-12-13 RX ORDER — 0.9 % SODIUM CHLORIDE 0.9 %
1000 INTRAVENOUS SOLUTION INTRAVENOUS ONCE
Status: COMPLETED | OUTPATIENT
Start: 2022-12-13 | End: 2022-12-13

## 2022-12-13 RX ORDER — DIPHENHYDRAMINE HYDROCHLORIDE 50 MG/ML
25 INJECTION INTRAMUSCULAR; INTRAVENOUS ONCE
Status: COMPLETED | OUTPATIENT
Start: 2022-12-13 | End: 2022-12-13

## 2022-12-13 RX ORDER — ACETAMINOPHEN 325 MG/1
650 TABLET ORAL ONCE
Status: COMPLETED | OUTPATIENT
Start: 2022-12-13 | End: 2022-12-13

## 2022-12-13 RX ADMIN — ACETAMINOPHEN 650 MG: 325 TABLET ORAL at 15:02

## 2022-12-13 RX ADMIN — PROCHLORPERAZINE EDISYLATE 10 MG: 5 INJECTION INTRAMUSCULAR; INTRAVENOUS at 15:15

## 2022-12-13 RX ADMIN — SODIUM CHLORIDE 1000 ML: 9 INJECTION, SOLUTION INTRAVENOUS at 15:14

## 2022-12-13 RX ADMIN — IOPAMIDOL 75 ML: 755 INJECTION, SOLUTION INTRAVENOUS at 14:26

## 2022-12-13 RX ADMIN — DIPHENHYDRAMINE HYDROCHLORIDE 25 MG: 50 INJECTION, SOLUTION INTRAMUSCULAR; INTRAVENOUS at 15:15

## 2022-12-13 ASSESSMENT — ENCOUNTER SYMPTOMS
DIARRHEA: 0
SORE THROAT: 0
BACK PAIN: 0
EYE PAIN: 0
COLOR CHANGE: 0
BLOOD IN STOOL: 0
NAUSEA: 0
RHINORRHEA: 0
CHEST TIGHTNESS: 0
CONSTIPATION: 0
SHORTNESS OF BREATH: 0
PHOTOPHOBIA: 0
ABDOMINAL PAIN: 0
COUGH: 0
VOMITING: 0
ABDOMINAL DISTENTION: 0

## 2022-12-13 ASSESSMENT — PAIN SCALES - GENERAL
PAINLEVEL_OUTOF10: 9
PAINLEVEL_OUTOF10: 0
PAINLEVEL_OUTOF10: 10
PAINLEVEL_OUTOF10: 9
PAINLEVEL_OUTOF10: 9
PAINLEVEL_OUTOF10: 10

## 2022-12-13 ASSESSMENT — PAIN DESCRIPTION - FREQUENCY
FREQUENCY: CONTINUOUS

## 2022-12-13 ASSESSMENT — PAIN DESCRIPTION - LOCATION
LOCATION: HEAD
LOCATION: ARM
LOCATION: HEAD
LOCATION: HEAD

## 2022-12-13 ASSESSMENT — PAIN DESCRIPTION - DESCRIPTORS
DESCRIPTORS: SHARP;NUMBNESS
DESCRIPTORS: ACHING
DESCRIPTORS: ACHING;POUNDING
DESCRIPTORS: PRESSURE;TINGLING;NUMBNESS

## 2022-12-13 ASSESSMENT — PAIN - FUNCTIONAL ASSESSMENT
PAIN_FUNCTIONAL_ASSESSMENT: 0-10
PAIN_FUNCTIONAL_ASSESSMENT: ACTIVITIES ARE NOT PREVENTED
PAIN_FUNCTIONAL_ASSESSMENT: 0-10
PAIN_FUNCTIONAL_ASSESSMENT: ACTIVITIES ARE NOT PREVENTED

## 2022-12-13 ASSESSMENT — PAIN DESCRIPTION - PAIN TYPE
TYPE: ACUTE PAIN

## 2022-12-13 ASSESSMENT — PAIN DESCRIPTION - ORIENTATION: ORIENTATION: LEFT

## 2022-12-13 NOTE — ED NOTES
Went  To room #4 to hand IV medication  Magnessium that was ordered and pt not in the room.  and pt both left without informing Ed staff, Unable to locate pt in the department and or ER waiting room. Unable locate second INT #20 left in the [facility] for IV medication as per ordered.   Dr Scott Mccurdy and Dr Joseline Díaz notified pt eloped and treatment not completed     Arnulfo Lemus RN  12/13/22 0816

## 2022-12-13 NOTE — ED NOTES
Radiology Procedure Waiver   Name: Dutch Colon  : 1970  MRN: [id number]    Date:  22    Time: 12:57 PM EST    Benefits of immediately proceeding with Radiology exam(s) without pre-testing outweigh the risks or are not indicated as specified below and therefore the following is/are being waived:    [] Pregnancy test   [] Patients LMP on-time and regular.   [] Patient had Tubal Ligation or has other Contraception Device. [] Patient  is Menopausal or Premenarcheal.    [] Patient had Full or Partial Hysterectomy. [] Protocol for Iodine allergy    [] MRI Questionnaire     [x] BUN/Creatinine   [] Patient age w/no hx of renal dysfunction. [] Patient on Dialysis. [] Recent Normal Labs.   Electronically signed by Zohra Hernandez DO on 22 at 12:57 PM EST               Zohra Hernandez DO  Resident  22 1257

## 2022-12-13 NOTE — ED PROVIDER NOTES
Name: Cheryle Dowdy    MRN: [id number]     Date / Time Roomed:  12/13/2022 12:17 PM  ED Bed Assignment:  HARPER/HARPER    ------------------ History of Present Illness --------------------  12/13/22, Time: 12:42 PM EST   Chief Complaint   Patient presents with    Migraine     Migraine hx of- now numbness left arm- states feels off balance LKW 0800 12/13/22      HPI    Cheryle Dowdy is a 47 y.o. female, with hx of smoking (quit 4 yrs ago, seizure, migraines, fibromyalgia, who presents to the ED today for left-sided headache which started suddenly this morning about 8 AM, 4.5 hours ago. Patient relates headache is 9 out of 10 pain, along the left side of her head. She relates that she has some left facial tingling as well as left arm weakness and tingling. She did relate feeling unsteady on her feet this morning. She states this does feel different than her previous migraines as her previous migraines have usually been right-sided and did not have any numbness or tingling with them. Patient denies any speech difficulty, vision changes, neck pain, back pain, chest pain, abdominal pain, nausea vomiting, GI or  complaints. The pt denies other ROS at this time. PCP: Julieta Arita MD.    -------------------- PMH --------------------  Past Medical History:  has a past medical history of Arthritis, Bleeding ulcer, Depression, DJD (degenerative joint disease), Fibromyalgia, GERD (gastroesophageal reflux disease), Hiatal hernia, Osteoarthritis, Osteopenia, and Seizure ([facility] [address] Past Surgical History:  has a past surgical history that includes Hysterectomy; Endometrial ablation; Knee arthroscopy (Left); fracture surgery; ECHO Compl W Dop Color Flow (3/21/2012); Cholecystectomy; back surgery; Breast surgery; Nerve Block; Appendectomy; Colonoscopy; Endoscopy, colon, diagnostic; pr arthrodesis ant interbody inc discectomy, cervical below c2 (N/A, 12/3/2018);  Neck surgery (12/2018); and hiatal hernia repair (N/A, 7/28/2020). Social History:  reports that she quit smoking about 10 years ago. She has never used smokeless tobacco. She reports that she does not drink alcohol and does not use drugs. Family History: family history is not on file. Allergies: Patient has no known allergies. The patients past medical history has been reviewed. -------------------- Current Meds --------------------  Meds:   Current Facility-Administered Medications:     magnesium sulfate 2000 mg in 50 mL IVPB premix, 2,000 mg, IntraVENous, Once, Vladislav Calles, DO    Current Outpatient Medications:     ondansetron (ZOFRAN ODT) 4 MG disintegrating tablet, Take 1 tablet by mouth every 8 hours as needed for Nausea or Vomiting, Disp: 10 tablet, Rfl: 0    naproxen (NAPROSYN) 500 MG tablet, Take 1 tablet by mouth 2 times daily (with meals), Disp: 20 tablet, Rfl: 0    naproxen (NAPROSYN) 250 MG tablet, Take 1 tablet by mouth 2 times daily for 7 days, Disp: 14 tablet, Rfl: 0    medical marijuana, Take by mouth as needed. , Disp: , Rfl:     ibuprofen (ADVIL;MOTRIN) 600 MG tablet, Take 1 tablet by mouth 3 times daily as needed for Pain, Disp: 30 tablet, Rfl: 0     The patients home medications have been reviewed. -------------------- ROS --------------------  Review of Systems   Constitutional:  Negative for chills, fatigue and fever. HENT:  Negative for congestion, rhinorrhea and sore throat. Eyes:  Negative for photophobia, pain and visual disturbance. Respiratory:  Negative for cough, chest tightness and shortness of breath. Cardiovascular:  Negative for chest pain, palpitations and leg swelling. Gastrointestinal:  Negative for abdominal distention, abdominal pain, blood in stool, constipation, diarrhea, nausea and vomiting. Genitourinary:  Negative for difficulty urinating, dysuria, hematuria, vaginal bleeding and vaginal discharge. Musculoskeletal:  Negative for back pain, neck pain and neck stiffness.    Skin: Negative for color change, rash and wound. Neurological:  Positive for weakness (left arm), numbness (left face and left arm) and headaches. Negative for dizziness, syncope, facial asymmetry, speech difficulty and light-headedness. Psychiatric/Behavioral:  Negative for agitation, behavioral problems and confusion.       -------------------- PE --------------------  Physical Exam  Constitutional:       General: She is not in acute distress. Appearance: Normal appearance. She is normal weight. She is not ill-appearing, toxic-appearing or diaphoretic. HENT:      Head: Normocephalic and atraumatic. Right Ear: External ear normal.      Left Ear: External ear normal.      Nose: Nose normal. No rhinorrhea. Mouth/Throat:      Pharynx: Oropharynx is clear. Eyes:      General: No scleral icterus. Right eye: No discharge. Left eye: No discharge. Extraocular Movements: Extraocular movements intact. Conjunctiva/sclera: Conjunctivae normal.      Pupils: Pupils are equal, round, and reactive to light. Cardiovascular:      Rate and Rhythm: Normal rate and regular rhythm. Pulses: Normal pulses. Pulmonary:      Effort: Pulmonary effort is normal. No respiratory distress. Breath sounds: Normal breath sounds. No stridor. Abdominal:      General: Abdomen is flat. There is no distension. Palpations: Abdomen is soft. Tenderness: There is no abdominal tenderness. There is no guarding. Musculoskeletal:         General: No swelling or tenderness. Normal range of motion. Cervical back: Normal range of motion. Right lower leg: No edema. Left lower leg: No edema. Skin:     General: Skin is warm and dry. Capillary Refill: Capillary refill takes less than 2 seconds. Coloration: Skin is not jaundiced. Findings: No erythema or rash. Neurological:      General: No focal deficit present.       Mental Status: She is alert and oriented to person, place, and time. Sensory: Sensory deficit (left face) present. Motor: Weakness (left arm) present. Psychiatric:         Mood and Affect: Mood normal.         Behavior: Behavior normal.          NIH Stroke Scale/Score at time of initial evaluation:  1A: Level of Consciousness 0 - alert; keenly responsive   1B: Ask Month and Age 0 - answers both questions correctly   1C: Tell Patient To Open and Close Eyes, then Hand  Squeeze 0 - performs both tasks correctly   2: Test Horizontal Extraocular Movements 0 - normal   3: Test Visual Fields 0 - no visual loss   4: Test Facial Palsy 0 - normal symmetric movement   5A: Test Left Arm Motor Drift 1 - drift, limb holds 90 (or 45) degrees but drifts down before full 10 seconds: does not hit bed   5B: Test Right Arm Motor Drift 0 - no drift, limb holds 90 (or 45) degrees for full 10 seconds   6A: Test Left Leg Motor Drift 0 - no drift; leg holds 30 degree position for full 5 seconds   6B: Test Right Leg Motor Drift 0 - no drift; leg holds 30 degree position for full 5 seconds   7: Test Limb Ataxia   (FNF/Heel-Shin) 0 - absent   8: Test Sensation 1 - mild to moderate sensory loss; patient feels pinprick is less sharp or is dull on the affected side; there is a loss of superficial pain with pinprick but patient is aware of being touched    9: Test Language/Aphasia 0 - no aphasia, normal   10: Test Dysarthria 0 - normal   11: Test Extinction/Inattention 0 - no abnormality   Total Score: 2   12/13/22 at 12:45 PM EST.       --------------- External Imaging -------------    -------------------- Procedures --------------------    -------------------- MDM --------------------    Pt presented to ED today for severe headache with left facial and arm tingling and left arm weakness which started 4.5 hrs ago. Feels different than previous migraines. Pt alert, oriented, no distress. Vitals stable.     Diagnoses considered, but not limited to, include stroke, TIA, complex migrane, carotid dissection, cardiac etiology. Initial NIH of 2. Stroke alert was called. EKG showed NSR, interpreted by myself. Imaging obtained included CXR and CT(s) and was unremarkable, CT, CTA head and neck were unremarkable, interpreted by myself and confirmed by radiologist.     Flor Burrell ordered, including, but not limited to, CMP, CBC, Troponin, Coags, and COVID & Influenza were were unremarkable and reassuring. Trop WNL. Medications given included IV Benadryl and NS (IV) and compazine. Pt's symptoms were much improved after treatment. Pt states no longer having HA or paresthesias of face or arm. States feeling her normal self. Vitals remained stable. Spoke with Dr. John Covington, telestroke, who did agree that this was likely migraine with aura, jane since she is now improved. He did recommend to give Mg which was ordered and have her f/u with neurology. Unfortunately prior to administration of magnesium and further discussion with patient, patient was nowhere to be found. She eloped from the emergency department with IV in place, did not tell any staff that she was leaving. Nursing did call her home line, her  answer the phone, nursing asked him to have his wife come back to the emergency department however he hung up the phone and on attempts to call back, no answer. Patient eloped from the emergency department. Final diagnosis: Migraine with aura, Pt eloped      EKG Interpretation  Interpreted by emergency department physician.     12/13/22  Time: 1338    Rate: 72  Axis: Normal  NJ: 140  QRS: 76  Qtc: 429  Rhythm: Regular  Clinical Impression: Normal sinus rhythm, no ST elevations  Comparison to old EKG: stable as compared to pt's most recent  from 4/14/21    -------------------- Consultations --------------------    Dr. John Covington, tele-stroke    -------------------- ED COURSE & MEDS GIVEN --------------------  Vitals:    12/13/22 1516   BP: 113/76   Pulse: 69   Resp:    Temp: 98.2 °F (36.8 °C)   SpO2:         /76   Pulse 69   Temp 98.2 °F (36.8 °C) (Oral)   Resp 18   Ht 5' 4\" (1.626 m)   Wt 195 lb 5 oz (88.6 kg)   LMP 08/21/2011   SpO2 96%   BMI 33.53 kg/m²     ED Course as of 12/13/22 1710   Tue Dec 13, 2022   [facility] for imaging [PW]   1344   EKG: Ventricular rate 74, HI interval 140, castration 84, QTc 424, normal sinus rhythm. Abnormalities seen in leads I and II. Possible ST segment elevation. Changes from prior EKG. We will repeat. EKG: Ventricular rate 72, HI interval 140, QRS duration 76, QTc 429, normal sinus rhythm. No ST segment elevation. [HH]   1504 Benadryl, compazine, iv fluids, tylenol ordered. [PW]   1504 CTA HEAD W CONTRAST  Negative. [PW]   1542 CTA NECK W CONTRAST  Negative. [PW]   1612 Pt states feeling much improved at this time, no longer having HA, no facial or arm symptoms. States feels her normal self. [PW]   [phone] with telestroke, Dr. Alcides Israel. Will give IV Mg now per recommendation and have pt f/u with neurology. [PW]   2930 Pt eloped from Emergency Department with her IV in without telling anyone. Nurse did call their house number and told  she needed to return however he hung up and when attempts made to call back, no answer.   [PW]      ED Course User Index  Earline Gooden MD  [PW] Lana Steven,         Medications   magnesium sulfate 2000 mg in 50 mL IVPB premix (has no administration in time range)   iopamidol (ISOVUE-370) 76 % injection 75 mL (75 mLs IntraVENous Given 12/13/22 1426)   acetaminophen (TYLENOL) tablet 650 mg (650 mg Oral Given 12/13/22 1502)   diphenhydrAMINE (BENADRYL) injection 25 mg (25 mg IntraVENous Given 12/13/22 1515)   prochlorperazine (COMPAZINE) injection 10 mg (10 mg IntraVENous Given 12/13/22 1515)   0.9 % sodium chloride bolus (0 mLs IntraVENous Stopped 12/13/22 1559)     -------------------- RESULTS --------------------    LABS:  Results for orders placed or performed during the hospital encounter of 12/13/22   COVID-19 & Influenza Combo    Specimen: Nasopharyngeal Swab   Result Value Ref Range    SARS-CoV-2 RNA, RT PCR NOT DETECTED NOT DETECTED    INFLUENZA A NOT DETECTED NOT DETECTED    INFLUENZA B NOT DETECTED NOT DETECTED   Troponin   Result Value Ref Range    Troponin, High Sensitivity <6 0 - 9 ng/L   Comprehensive Metabolic Panel w/ Reflex to MG   Result Value Ref Range    Sodium 139 132 - 146 mmol/L    Potassium reflex Magnesium 3.8 3.5 - 5.0 mmol/L    Chloride 104 98 - 107 mmol/L    CO2 22 22 - 29 mmol/L    Anion Gap 13 7 - 16 mmol/L    Glucose 100 (H) 74 - 99 mg/dL    BUN 19 6 - 20 mg/dL    Creatinine 0.8 0.5 - 1.0 mg/dL    Est, Glom Filt Rate >60 >=60 mL/min/1.73    Calcium 9.5 8.6 - 10.2 mg/dL    Total Protein 7.6 6.4 - 8.3 g/dL    Albumin 4.5 3.5 - 5.2 g/dL    Total Bilirubin 0.4 0.0 - 1.2 mg/dL    Alkaline Phosphatase 89 35 - 104 U/L    ALT 15 0 - 32 U/L    AST 14 0 - 31 U/L   CBC with Auto Differential   Result Value Ref Range    WBC 10.4 4.5 - 11.5 E9/L    RBC 4.67 3.50 - 5.50 E12/L    Hemoglobin 13.2 11.5 - 15.5 g/dL    Hematocrit 38.5 34.0 - 48.0 %    MCV 82.4 80.0 - 99.9 fL    MCH 28.3 26.0 - 35.0 pg    MCHC 34.3 32.0 - 34.5 %    RDW 18.3 (H) 11.5 - 15.0 fL    Platelets 085 399 - 170 E9/L    MPV 10.3 7.0 - 12.0 fL    Neutrophils % 62.6 43.0 - 80.0 %    Immature Granulocytes % 0.4 0.0 - 5.0 %    Lymphocytes % 29.2 20.0 - 42.0 %    Monocytes % 6.4 2.0 - 12.0 %    Eosinophils % 1.0 0.0 - 6.0 %    Basophils % 0.4 0.0 - 2.0 %    Neutrophils Absolute 6.49 1.80 - 7.30 E9/L    Immature Granulocytes # 0.04 E9/L    Lymphocytes Absolute 3.03 1.50 - 4.00 E9/L    Monocytes Absolute 0.66 0.10 - 0.95 E9/L    Eosinophils Absolute 0.10 0.05 - 0.50 E9/L    Basophils Absolute 0.04 0.00 - 0.20 E9/L   Troponin   Result Value Ref Range    Troponin, High Sensitivity <6 0 - 9 ng/L   Protime-INR   Result Value Ref Range    Protime 12.1 9.3 - 12.4 sec    INR 1.1    POCT Glucose Result Value Ref Range    Glucose 84 mg/dL    QC OK? ok    POCT Glucose   Result Value Ref Range    Meter Glucose 84 74 - 99 mg/dL   EKG 12 Lead   Result Value Ref Range    Ventricular Rate 74 BPM    Atrial Rate 74 BPM    P-R Interval 140 ms    QRS Duration 84 ms    Q-T Interval 382 ms    QTc Calculation (Bazett) 424 ms    P Axis -24 degrees    R Axis -164 degrees    T Axis 139 degrees   EKG 12 Lead   Result Value Ref Range    Ventricular Rate 72 BPM    Atrial Rate 72 BPM    P-R Interval 140 ms    QRS Duration 76 ms    Q-T Interval 392 ms    QTc Calculation (Bazett) 429 ms    P Axis 51 degrees    R Axis 37 degrees    T Axis 34 degrees       RADIOLOGY:  CTA NECK W CONTRAST   Final Result   1. No acute intracranial hemorrhage or edema. 2. No intracranial arterial stenosis. 3. No stenosis involving internal carotid arteries or vertebral arteries. CTA HEAD W CONTRAST   Final Result   1. No acute intracranial hemorrhage or edema. 2. No intracranial arterial stenosis. 3. No stenosis involving internal carotid arteries or vertebral arteries. -------------------- NURSING NOTES & VITALS REVIEWED --------------------    The nursing notes within the ED encounter and vital signs have been reviewed.      Patient Vitals for the past 8 hrs:   BP Temp Temp src Pulse Resp SpO2 Height Weight   12/13/22 1516 113/76 98.2 °F (36.8 °C) Oral 69 -- -- -- --   12/13/22 1356 131/75 -- -- 80 18 96 % 5' 4\" (1.626 m) 195 lb 5 oz (88.6 kg)   12/13/22 1345 103/69 -- -- 61 16 97 % -- --   12/13/22 1338 -- -- -- -- -- -- 5' 4\" (1.626 m) 195 lb 5 oz (88.6 kg)   12/13/22 1330 113/73 -- -- 71 18 99 % -- --   12/13/22 1315 137/79 -- -- 65 18 -- -- --   12/13/22 1300 131/75 -- -- 80 18 96 % -- --   12/13/22 1207 126/74 98 °F (36.7 °C) -- 87 16 97 % 5' 4\" (1.626 m) 185 lb (83.9 kg)       Oxygen Saturation Interpretation: Normal    Unfortunately prior to discussion with patient and before treatment was finished, pt eloped from ED with IV . Diagnosis:  1. Eloped from emergency department    2. Migraine with aura and without status migrainosus, not intractable          Rivera Couch DO      *NOTE: This report was transcribed using voice recognition software. Every effort was made to ensure accuracy; however, inadvertent computerized transcription errors may be present.              Rivera Couch DO  Resident  12/13/22 9146

## 2022-12-13 NOTE — ED NOTES
I Called the mobile # on file for pt and her  answered, pt unable to talk with  Or would not talk with me. Message was given to family that she left before treatment was completed and that I was unable to find the IV that was in her left arm, he responded with Ok and hung up. I attempted to call back but it went to [employer] and was unable to leave a message the mail box was  Full.   I called the house number and left a message to call the Ed concerning the IV( Phone number for the ER was also given)  Will attempt to call back     Veronica Middleton RN  12/13/22 [phone]

## 2022-12-13 NOTE — ED NOTES
Department of Emergency Medicine  FIRST PROVIDER TRIAGE NOTE             Independent MLP           12/13/22  12:13 PM EST    Date of Encounter: 12/13/22   MRN: [id number]      HPI: Gena Perez is a 47 y.o. female who presents to the ED for Migraine (Migraine hx of- now numbness left arm- states feels off balance LKW 0800 12/13/22)  Presents with 24-hour history of a left-sided headache with left-sided facial numbness, left shoulder pain with left arm numbness, feeling off balance. Last known well at 8 AM.  Does have a history of migraines however this is different than her typical migraine. She did take some ibuprofen without any relief. She has had a recent URI and was tested for COVID and influenza which were negative. She was in her usual state of health prior to onset of symptoms. ROS: Negative for cp, sob, fever, or dizziness. PE: Gen Appearance/Constitutional: alert  HEENT: NC/NT. PERRLA,  Airway patent. Neck: supple  CV: regular rate  Pulm: CTA bilat  GI: soft and NT  Musculoskeletal: moves all extremities x 4  Lymphatics: no edema     Initial Plan of Care: All treatment areas with department are currently occupied. Plan to order/Initiate the following while awaiting opening in ED: labs, EKG, imaging studies, and COVID-19 testing.   Initiate Treatment-Testing, Proceed toTreatment Area When Bed Available for ED Attending/MLP to Continue Care    Electronically signed by YUMIKO Cain CNP   DD: 12/13/22      YUMIKO Cain CNP  12/13/22 6082

## 2022-12-13 NOTE — ED NOTES
Patient is free from pa and stated \" the numbness and tingling has also gone\"  Patient wanting to go home. Dr Garcia Fraction aware of the above.      Priscilla Hall RN  12/13/22 1600

## 2022-12-13 NOTE — VIRTUAL HEALTH
Consults    Dominique Chew, was evaluated through a synchronous (real-time) audio-video encounter. The patient (or guardian if applicable) is aware that this is a billable service. Verbal consent to proceed has been obtained. Patient identification was verified, and a caregiver was present when appropriate. The patient was located at Home: [facility]  The provider was located at [facility] Dept): [facility]  [address] [address]  [phone]     Total time spent on this encounter:  21    --Marv John MD on 2022 at 3:57 PM    An electronic signature was used to authenticate this note. [facility] Stroke and Vascular Neurology Consult for  [place] Stroke Alert through [address] @ 15:48  2022 3:57 PM  Pt Name: Dominique Chew  MRN: [id number]  [place]: 1970  Date of evaluation: 2022  Primary Care Physician: Beth Smith MD  Reason for Evaluation: Stroke evaluation with Phone Consult, Discussion and Review of imaging    Dominique Chew is a 47 y.o. female with hx of migraine x 10 years, used to take imitrex for  of migraine, now not taking any, c/o migraine flare 3 times per month and see PCP, pt has not seen neurologist.    Patient came in with a bad headache, left side numbness and weakness, this resolved after IVF NS, benadryl and compazine given by ED    LKW: 8am   NIH:  0    Allergies  has No Known Allergies. Medications  Prior to Admission medications    Medication Sig Start Date End Date Taking?  Authorizing Provider   ondansetron (ZOFRAN ODT) 4 MG disintegrating tablet Take 1 tablet by mouth every 8 hours as needed for Nausea or Vomiting 22   Shakira Suárez MD   naproxen (NAPROSYN) 500 MG tablet Take 1 tablet by mouth 2 times daily (with meals) 22   YUMIKO Rushing CNP   naproxen (NAPROSYN) 250 MG tablet Take 1 tablet by mouth 2 times daily for 7 days 8/10/21 8/17/21  Juanita Carroll DO   medical marijuana Take by mouth as needed. Historical Provider, MD   ibuprofen (ADVIL;MOTRIN) 600 MG tablet Take 1 tablet by mouth 3 times daily as needed for Pain 7/21/21   YUMIKO Dunham - CNP    Scheduled Meds:   sodium chloride  1,000 mL IntraVENous Once     Continuous Infusions:  PRN Meds:.  Past Medical History   has a past medical history of Arthritis, Bleeding ulcer, Depression, DJD (degenerative joint disease), Fibromyalgia, GERD (gastroesophageal reflux disease), Hiatal hernia, Osteoarthritis, Osteopenia, and Seizure ([facility] [address] Social History  Social History     Socioeconomic History    Marital status:      Spouse name: Not on file    Number of children: Not on file    Years of education: Not on file    Highest education level: Not on file   Occupational History    Not on file   Tobacco Use    Smoking status: Former     Years: 20.00     Types: Cigarettes     Quit date: 11/20/2012     Years since quitting: 10.0    Smokeless tobacco: Never   Vaping Use    Vaping Use: Never used   Substance and Sexual Activity    Alcohol use: No     Comment: rare    Drug use: No    Sexual activity: Yes     Partners: Male     Birth control/protection: Surgical   Other Topics Concern    Not on file   Social History Narrative    Not on file     Social Determinants of Health     Financial Resource Strain: Not on file   Food Insecurity: Not on file   Transportation Needs: Not on file   Physical Activity: Not on file   Stress: Not on file   Social Connections: Not on file   Intimate Partner Violence: Not on file   Housing Stability: Not on file     Family History  No family history on file. OBJECTIVE  /76   Pulse 69   Temp 98.2 °F (36.8 °C) (Oral)   Resp 18   Ht 5' 4\" (1.626 m)   Wt 195 lb 5 oz (88.6 kg)   LMP 08/21/2011   SpO2 96%   BMI 33.53 kg/m²     NIH Stroke Scale  Interval: Baseline  Level of Consciousness (1a): Alert  LOC Questions (1b):  Answers both correctly  LOC Commands (1c): Performs both tasks correctly  Best Gaze (2): Normal  Visual (3): No visual loss  Facial Palsy (4): Normal symmetrical movement  Motor Arm, Left (5a): No drift  Motor Arm, Right (5b): No drift  Motor Leg, Left (6a): No drift  Motor Leg, Right (6b): No drift  Limb Ataxia (7): Absent  Sensory (8): (!) Mild to Moderate  Best Language (9): No aphasia  Dysarthria (10): Normal  Extinction and Inattention (11): No abnormality  Pre-Morbid mRS: 0    Imaging:  Images were personally reviewed with PACS used to review images including:  CT brain without contrast: nothing acute  CTA imaging: no LVO    Assessment    47year old woman with migraine aura. Recommendations:  NIH 0  Recommend Outpatient Neurology Consult for further assessment and evaluation    consider . [id number]  Symptoms resolved, not a stroke  Recommend magnesium oxide 400mg PRN daily for headache  F/u with neurology outpatient for chronic migraine management        Discussed with ED Physician Dr Jose Nguyen        This is a Phone Consult, I have not seen the patient face to face, the telemedicine device was not utilized.     Serafin Muir MD  Stroke, Neurocritical Care And/or [address] [facility]  [address] [address]  Electronically signed 12/13/2022 at 3:57 PM

## 2022-12-13 NOTE — ED NOTES
Assumed care of pt. Pt here with c/o severe \"migaine \" that started 0800 this am, without being light or sound sensative. Patient denies n/V or other pain and discomfort. Pt reporting that [id number] noted left sided numbness and tingling sensation to left cheek and upper Left arm. Pt reporting that she is feeling lightheaded and \"off\". NIH scale completed with result rated1 for sensory left side.   Moveing all extremites well     Virgen Fernandez RN  12/13/22 2208

## 2022-12-15 LAB
EKG ATRIAL RATE: 72 BPM
EKG P AXIS: 51 DEGREES
EKG P-R INTERVAL: 140 MS
EKG Q-T INTERVAL: 392 MS
EKG QRS DURATION: 76 MS
EKG QTC CALCULATION (BAZETT): 429 MS
EKG R AXIS: 37 DEGREES
EKG T AXIS: 34 DEGREES
EKG VENTRICULAR RATE: 72 BPM

## 2022-12-15 PROCEDURE — 93010 ELECTROCARDIOGRAM REPORT: CPT | Performed by: INTERNAL MEDICINE

## 2023-01-02 PROBLEM — Z53.21 ELOPED FROM EMERGENCY DEPARTMENT: Status: ACTIVE | Noted: 2023-01-02

## 2023-11-30 ENCOUNTER — HOSPITAL ENCOUNTER (OUTPATIENT)
Dept: GENERAL RADIOLOGY | Age: 53
Discharge: HOME | End: 2023-12-02
Attending: OBSTETRICS & GYNECOLOGY
Payer: MEDICAID

## 2023-11-30 VITALS — WEIGHT: 160 LBS | BODY MASS INDEX: 27.31 KG/M2 | HEIGHT: 64 IN

## 2023-11-30 DIAGNOSIS — Z12.31 SCREENING MAMMOGRAM, ENCOUNTER FOR: ICD-10-CM

## 2023-11-30 PROCEDURE — 77063 BREAST TOMOSYNTHESIS BI: CPT

## 2023-12-08 ENCOUNTER — TELEPHONE (OUTPATIENT)
Dept: GENERAL RADIOLOGY | Age: 53
End: 2023-12-08

## 2023-12-08 NOTE — TELEPHONE ENCOUNTER
Call to patient in reference to her mammogram performed at [facility] on November 30, 2023. Instructed patient that the radiologist has recommended some additional breast imaging, in order to make a final determination/result. Verbalizes understanding and is agreeable to proceed.

## 2023-12-26 ENCOUNTER — HOSPITAL ENCOUNTER (OUTPATIENT)
Dept: GENERAL RADIOLOGY | Age: 53
Discharge: HOME | End: 2023-12-28
Payer: MEDICAID

## 2023-12-26 DIAGNOSIS — R92.8 ABNORMAL MAMMOGRAM: ICD-10-CM

## 2023-12-26 PROCEDURE — G0279 TOMOSYNTHESIS, MAMMO: HCPCS

## 2024-08-09 ENCOUNTER — HOSPITAL ENCOUNTER (OUTPATIENT)
Dept: CT IMAGING | Age: 54
End: 2024-08-09
Attending: STUDENT IN AN ORGANIZED HEALTH CARE EDUCATION/TRAINING PROGRAM
Payer: MEDICAID

## 2024-08-09 ENCOUNTER — HOSPITAL ENCOUNTER (OUTPATIENT)
Dept: MRI IMAGING | Age: 54
End: 2024-08-09
Attending: STUDENT IN AN ORGANIZED HEALTH CARE EDUCATION/TRAINING PROGRAM
Payer: MEDICAID

## 2024-08-09 DIAGNOSIS — M50.10 CERVICAL DISC DISORDER WITH RADICULOPATHY, UNSPECIFIED CERVICAL REGION: ICD-10-CM

## 2024-08-09 DIAGNOSIS — M47.812 SPONDYLOSIS WITHOUT MYELOPATHY OR RADICULOPATHY, CERVICAL REGION: ICD-10-CM

## 2024-08-09 DIAGNOSIS — M50.30 OTHER CERVICAL DISC DEGENERATION, UNSPECIFIED CERVICAL REGION: ICD-10-CM

## 2024-08-09 PROCEDURE — 72141 MRI NECK SPINE W/O DYE: CPT

## 2024-08-09 PROCEDURE — 72125 CT NECK SPINE W/O DYE: CPT

## (undated) DEVICE — APPLICATOR MEDICATED 26 CC SOLUTION HI LT ORNG CHLORAPREP

## (undated) DEVICE — SYRINGE MED 10ML TRNSLUC BRL PLUNG BLK MRK POLYPR CTRL

## (undated) DEVICE — KIT BEDSIDE REVITAL OX 500ML

## (undated) DEVICE — TROCAR: Brand: KII SLEEVE

## (undated) DEVICE — CAMERA STRYKER 1488 HD GEN

## (undated) DEVICE — SET SPINAL NEURO STNEU1

## (undated) DEVICE — PUMP SUC IRR TBNG L10FT W/ HNDPC ASSEMB STRYKEFLOW 2

## (undated) DEVICE — COVER,LIGHT HANDLE,FLX,1/PK: Brand: MEDLINE INDUSTRIES, INC.

## (undated) DEVICE — EXTRAS KOHLI

## (undated) DEVICE — DOUBLE BASIN SET: Brand: MEDLINE INDUSTRIES, INC.

## (undated) DEVICE — GARMENT,MEDLINE,DVT,INT,CALF,MED, GEN2: Brand: MEDLINE

## (undated) DEVICE — Z INACTIVE USE 2660664 SOLUTION IRRIG 3000ML 0.9% SOD CHL USP UROMATIC PLAS CONT

## (undated) DEVICE — DRAPE,REIN 53X77,STERILE: Brand: MEDLINE

## (undated) DEVICE — MARKER,SKIN,WI/RULER AND LABELS: Brand: MEDLINE

## (undated) DEVICE — CODMAN® SURGICAL PATTIES 1/2" X 1" (1.27CM X 2.54CM): Brand: CODMAN®

## (undated) DEVICE — INSUFFLATION NEEDLE TO ESTABLISH PNEUMOPERITONEUM.: Brand: INSUFFLATION NEEDLE

## (undated) DEVICE — TROCAR: Brand: KII FIOS FIRST ENTRY

## (undated) DEVICE — COVER,TABLE,60X90,STERILE: Brand: MEDLINE

## (undated) DEVICE — ELECTRODE PT RET AD L9FT HI MOIST COND ADH HYDRGEL CORDED

## (undated) DEVICE — NEEDLE SPNL 20GA L3.5IN YEL HUB S STL REG WALL FIT STYL W/

## (undated) DEVICE — 3M(TM) MEDIPORE(TM) +PAD SOFT CLOTH ADHESIVE WOUND DRESSING 3569: Brand: 3M™ MEDIPORE™

## (undated) DEVICE — BLADE CLIPPER GEN PURP NS

## (undated) DEVICE — GOWN,SIRUS,NONRNF,SETINSLV,XL,20/CS: Brand: MEDLINE

## (undated) DEVICE — MINOR PROCEDURE DRAPE: Brand: CONVERTORS

## (undated) DEVICE — GLOVE ORANGE PI 7 1/2   MSG9075

## (undated) DEVICE — INSTRUMENT EXTRAS ACF SINGLE ORANGE OR W

## (undated) DEVICE — DISSECTOR SONICISION CRV JAW 5MM-48CM

## (undated) DEVICE — HARNESS CERV VIS 920877000000] ALIMED INC]

## (undated) DEVICE — GAUZE,SPONGE,AVANT,4"X4",4PLY,STRL,10/TR: Brand: MEDLINE

## (undated) DEVICE — BUR CUT RND FLUT SFT TOUCH 4.0MM

## (undated) DEVICE — Device

## (undated) DEVICE — SURGICAL PROCEDURE PACK LAMINECTOMY LUMBAR

## (undated) DEVICE — 3M™ STERI-DRAPE™ INCISE DRAPE 1040 (35CM X 35CM): Brand: STERI-DRAPE™

## (undated) DEVICE — PMI PTFE COATED LAPAROSCOPIC WIRE L-HOOK 44 CM: Brand: PMI

## (undated) DEVICE — [HIGH FLOW INSUFFLATOR,  DO NOT USE IF PACKAGE IS DAMAGED,  KEEP DRY,  KEEP AWAY FROM SUNLIGHT,  PROTECT FROM HEAT AND RADIOACTIVE SOURCES.]: Brand: PNEUMOSURE

## (undated) DEVICE — PIN ADLT MAYFIELD RIGID MOLD FINGER

## (undated) DEVICE — GLOVE SURG SZ 65 THK91MIL LTX FREE SYN POLYISOPRENE

## (undated) DEVICE — RETRACTOR AFC SHADOW LINE

## (undated) DEVICE — NEEDLE HYPO 25GA L1.5IN BLU POLYPR HUB S STL REG BVL STR

## (undated) DEVICE — GOWN,SIRUS,FABRNF,L,20/CS: Brand: MEDLINE

## (undated) DEVICE — AGENT HEMSTAT W2XL4IN OXIDIZED REGENERATED CELOS ABSRB

## (undated) DEVICE — SUTURE DEV SZ 0 L6IN N ABSORBABLE

## (undated) DEVICE — TOWEL,OR,DSP,ST,BLUE,STD,6/PK,12PK/CS: Brand: MEDLINE

## (undated) DEVICE — GAUZE,SPONGE,4"X4",16PLY,XRAY,STRL,LF: Brand: MEDLINE

## (undated) DEVICE — PLUMEPORT LAPAROSCOPIC SMOKE FILTRATION DEVICE: Brand: PLUMEPORT ACTIV

## (undated) DEVICE — SET ENDO INSTR LAPAROSCOPIC INCISIONAL

## (undated) DEVICE — SUTURE ABSRB L6IN L37MM 0 GS-21 GRN 1/2 CIR TAPR PNT NDL VLOCL0306

## (undated) DEVICE — LAPAROSCOPIC SCISSORS: Brand: EPIX LAPAROSCOPIC SCISSORS

## (undated) DEVICE — TUBING, SUCTION, 1/4" X 10', STRAIGHT: Brand: MEDLINE

## (undated) DEVICE — COLLAR CERV SERP SM XLN 23 X 3.5 IN CONTOURED

## (undated) DEVICE — PACK SURG LAP CHOLE CUSTOM

## (undated) DEVICE — GLOVE ORANGE PI 8   MSG9080

## (undated) DEVICE — BLOCK BITE 60FR CAREGUARD

## (undated) DEVICE — DRILL BIT 7080510 11 MM DRILL BIT S

## (undated) DEVICE — DISPOSABLE IRRIGATION CASSETTE: Brand: CORE

## (undated) DEVICE — DRAPE MICSCP 65MM LENS FOR LEICA VARI-LENS2

## (undated) DEVICE — STRIP,CLOSURE,WOUND,MEDI-STRIP,1/4X3: Brand: MEDLINE

## (undated) DEVICE — 1.5L THIN WALL CAN: Brand: CRD